# Patient Record
Sex: FEMALE | Race: WHITE | NOT HISPANIC OR LATINO | Employment: PART TIME | URBAN - METROPOLITAN AREA
[De-identification: names, ages, dates, MRNs, and addresses within clinical notes are randomized per-mention and may not be internally consistent; named-entity substitution may affect disease eponyms.]

---

## 2018-10-24 ENCOUNTER — TRANSFERRED RECORDS (OUTPATIENT)
Dept: HEALTH INFORMATION MANAGEMENT | Facility: CLINIC | Age: 58
End: 2018-10-24

## 2019-06-05 ENCOUNTER — TRANSFERRED RECORDS (OUTPATIENT)
Dept: HEALTH INFORMATION MANAGEMENT | Facility: CLINIC | Age: 59
End: 2019-06-05

## 2019-06-13 ENCOUNTER — TRANSFERRED RECORDS (OUTPATIENT)
Dept: HEALTH INFORMATION MANAGEMENT | Facility: CLINIC | Age: 59
End: 2019-06-13

## 2019-12-12 NOTE — PROGRESS NOTES
Subjective     Mayte Parmar is a 59 year old female who presents to clinic today for the following health issues:    HPI   New Patient/Transfer of Care from [place], [place]. Born and raised here in [place]. Her  has retired now and she currently has no insurance.   Diabetes Follow-up      How often are you checking your blood sugar? Not at all    What concerns do you have today about your diabetes? None     Do you have any of these symptoms? (Select all that apply)  No numbness or tingling in feet.  No redness, sores or blisters on feet.  No complaints of excessive thirst.  No reports of blurry vision.  No significant changes to weight.     Have you had a diabetic eye exam in the last 12 months? Yes- Date of last eye exam: 2019    BP Readings from Last 2 Encounters:   12/24/19 128/74     No results found for: A1C, LDL    Diabetes Management Resources  She would like to have her A1C checked. She has lost 143 pounds with a last A1C of 5.7 in August and weaned off of metformin. She continues on a low carb diet. She has not been on a statin    Gout  She has occasional flares of her feet and would like to have medication on hand. She doesn't recall what this was.     Patient Active Problem List   Diagnosis     Morbid obesity (H)     Type 2 diabetes mellitus without complication, without long-term current use of insulin (H)     Idiopathic chronic gout of foot without tophus, unspecified laterality     Past Surgical History:   Procedure Laterality Date     BREAST BIOPSY, CORE RT/LT Right 2011     DILATION AND CURETTAGE  1987    missed ab     DILATION AND CURETTAGE  1997    missed ab     MAMMOGRAM - HIM SCAN  2011     MASTECTOMY Right 2011    due to infected biospy site. No cancer     PAP DIAGNOSTIC SMEAR  1990       Social History     Tobacco Use     Smoking status: Never Smoker     Smokeless tobacco: Never Used   Substance Use Topics     Alcohol use: Never     Frequency: Never     Family History   Problem Relation Age  of Onset     Obesity Mother      Coronary Artery Disease Father      Depression Father      Obesity Father      Abdominal Aortic Aneurysm Father 73        cause of death         Current Outpatient Medications   Medication Sig Dispense Refill     aspirin (ASA) 81 MG EC tablet Take 1 tablet (81 mg) by mouth daily 100 tablet 3     ibuprofen (ADVIL/MOTRIN) 200 MG tablet Take 200 mg by mouth every 4 hours as needed for mild pain       indomethacin (INDOCIN) 25 MG capsule Take 1 capsule (25 mg) by mouth 2 times daily (with meals) 30 capsule 1     No Known Allergies  No lab results found.   BP Readings from Last 3 Encounters:   12/24/19 128/74    Wt Readings from Last 3 Encounters:   12/24/19 119.3 kg (263 lb)                      Reviewed and updated as needed this visit by Provider         Review of Systems   ROS COMP: Constitutional, HEENT, cardiovascular, pulmonary, GI, , musculoskeletal, neuro, skin, endocrine and psych systems are negative, except as otherwise noted.      Objective    /74   Pulse 87   Resp 19   Ht 1.524 m (5')   Wt 119.3 kg (263 lb)   SpO2 97%   Breastfeeding No   BMI 51.36 kg/m    Body mass index is 51.36 kg/m .  Physical Exam   GENERAL: healthy, alert and no distress  EYES: Eyes grossly normal to inspection, PERRL and conjunctivae and sclerae normal  HENT: ear canals and TM's normal, nose and mouth without ulcers or lesions  NECK: no adenopathy, no asymmetry, masses, or scars and thyroid normal to palpation  RESP: lungs clear to auscultation - no rales, rhonchi or wheezes  CV: regular rate and rhythm, normal S1 S2, no S3 or S4, no murmur, click or rub, no peripheral edema and peripheral pulses strong  ABDOMEN: soft, nontender, no hepatosplenomegaly, no masses and bowel sounds normal  MS: no gross musculoskeletal defects noted, no edema  SKIN: no suspicious lesions or rashes  NEURO: Normal strength and tone, mentation intact and speech normal  PSYCH: mentation appears normal, affect  normal/bright            Assessment & Plan     1. Encounter to establish care  PMH, PSH, SH, allergies, FH all reviewed. She has never had a colonoscopy but doesn't have insurance at this time,discussed cologuard. Will consider when she has insurance.Our community health staff has seen her today to help with getting insurance    2. Idiopathic chronic gout of foot without tophus, unspecified laterality  Will send in indocin for her to have on hand  - indomethacin (INDOCIN) 25 MG capsule; Take 1 capsule (25 mg) by mouth 2 times daily (with meals)  Dispense: 30 capsule; Refill: 1    3. Type 2 diabetes mellitus without complication, without long-term current use of insulin (H)  Check labs today. Advised starting and aspirin daily  - Hemoglobin A1c  - Lipid Profile    4. Morbid obesity (H)  Working on continued weight loss. She is encouraged to continue       BMI:   Estimated body mass index is 51.36 kg/m  as calculated from the following:    Height as of this encounter: 1.524 m (5').    Weight as of this encounter: 119.3 kg (263 lb).   Weight management plan: Discussed healthy diet and exercise guidelines            Follow up when she has insurance    Rachel Ball MD  [facility] [place]

## 2019-12-24 ENCOUNTER — OFFICE VISIT (OUTPATIENT)
Dept: BEHAVIORAL HEALTH | Facility: OTHER | Age: 59
End: 2019-12-24
Attending: SOCIAL WORKER

## 2019-12-24 ENCOUNTER — OFFICE VISIT (OUTPATIENT)
Dept: FAMILY MEDICINE | Facility: OTHER | Age: 59
End: 2019-12-24
Attending: FAMILY MEDICINE

## 2019-12-24 VITALS
HEIGHT: 60 IN | WEIGHT: 263 LBS | DIASTOLIC BLOOD PRESSURE: 74 MMHG | BODY MASS INDEX: 51.63 KG/M2 | SYSTOLIC BLOOD PRESSURE: 128 MMHG | OXYGEN SATURATION: 97 % | RESPIRATION RATE: 19 BRPM | HEART RATE: 87 BPM

## 2019-12-24 DIAGNOSIS — R69 DIAGNOSIS DEFERRED: Primary | ICD-10-CM

## 2019-12-24 DIAGNOSIS — E66.01 MORBID OBESITY (H): ICD-10-CM

## 2019-12-24 DIAGNOSIS — E11.9 TYPE 2 DIABETES MELLITUS WITHOUT COMPLICATION, WITHOUT LONG-TERM CURRENT USE OF INSULIN (H): ICD-10-CM

## 2019-12-24 DIAGNOSIS — Z76.89 ENCOUNTER TO ESTABLISH CARE: Primary | ICD-10-CM

## 2019-12-24 DIAGNOSIS — M1A.0790 IDIOPATHIC CHRONIC GOUT OF FOOT WITHOUT TOPHUS, UNSPECIFIED LATERALITY: ICD-10-CM

## 2019-12-24 LAB
CHOLEST SERPL-MCNC: 198 MG/DL
EST. AVERAGE GLUCOSE BLD GHB EST-MCNC: 117 MG/DL
HBA1C MFR BLD: 5.7 % (ref 0–5.6)
HDLC SERPL-MCNC: 60 MG/DL
LDLC SERPL CALC-MCNC: 123 MG/DL
NONHDLC SERPL-MCNC: 138 MG/DL
TRIGL SERPL-MCNC: 77 MG/DL

## 2019-12-24 PROCEDURE — 99204 OFFICE O/P NEW MOD 45 MIN: CPT | Performed by: FAMILY MEDICINE

## 2019-12-24 PROCEDURE — 83036 HEMOGLOBIN GLYCOSYLATED A1C: CPT | Performed by: FAMILY MEDICINE

## 2019-12-24 PROCEDURE — 80061 LIPID PANEL: CPT | Performed by: FAMILY MEDICINE

## 2019-12-24 PROCEDURE — 36415 COLL VENOUS BLD VENIPUNCTURE: CPT | Performed by: FAMILY MEDICINE

## 2019-12-24 RX ORDER — INDOMETHACIN 25 MG/1
25 CAPSULE ORAL 2 TIMES DAILY WITH MEALS
Qty: 30 CAPSULE | Refills: 1 | Status: SHIPPED | OUTPATIENT
Start: 2019-12-24 | End: 2021-09-21

## 2019-12-24 RX ORDER — IBUPROFEN 200 MG
200 TABLET ORAL EVERY 4 HOURS PRN
COMMUNITY

## 2019-12-24 SDOH — HEALTH STABILITY: MENTAL HEALTH: HOW OFTEN DO YOU HAVE A DRINK CONTAINING ALCOHOL?: NEVER

## 2019-12-24 ASSESSMENT — PAIN SCALES - GENERAL: PAINLEVEL: NO PAIN (0)

## 2019-12-24 ASSESSMENT — MIFFLIN-ST. JEOR: SCORE: 1679.46

## 2019-12-24 NOTE — NURSING NOTE
Chief Complaint   Patient presents with     Establish Care       Initial /74   Pulse 87   Resp 19   Ht 1.524 m (5')   Wt 119.3 kg (263 lb)   SpO2 97%   Breastfeeding No   BMI 51.36 kg/m   Estimated body mass index is 51.36 kg/m  as calculated from the following:    Height as of this encounter: 1.524 m (5').    Weight as of this encounter: 119.3 kg (263 lb).  Medication Reconciliation: complete  Dai Bradley LPN

## 2019-12-24 NOTE — PROGRESS NOTES
CHW met with patient, introduced self and program, and assisted patient with their current struggle with insurance. Patient has currently moved to [place] from [place] and does not have insurance. Patient was on their 's employer insurance in [place]. CHW brought patient to [employer] to be assisted in applying for insurance. CHW gave patient CHW and  EDWIN Stubbs contact information. CHW encouraged patient to reach out if they need anything.    PROSPER VazquezW  Community Health Worker, CHW  [facility]

## 2020-06-16 ENCOUNTER — MEDICAL CORRESPONDENCE (OUTPATIENT)
Dept: HEALTH INFORMATION MANAGEMENT | Facility: HOSPITAL | Age: 60
End: 2020-06-16

## 2021-04-15 ENCOUNTER — TRANSFERRED RECORDS (OUTPATIENT)
Dept: HEALTH INFORMATION MANAGEMENT | Facility: CLINIC | Age: 61
End: 2021-04-15

## 2021-09-16 ENCOUNTER — TELEPHONE (OUTPATIENT)
Dept: FAMILY MEDICINE | Facility: OTHER | Age: 61
End: 2021-09-16

## 2021-09-16 NOTE — TELEPHONE ENCOUNTER
Next 5 appointments (look out 90 days)      Sep 21, 2021  2:30 PM  (Arrive by 2:15 PM)  SHORT with Rachel Ball MD  [facility] [place] ([facility] ) [address]  [place] [address]  [phone]

## 2021-09-16 NOTE — TELEPHONE ENCOUNTER
Please schedule patient for date/time: can see next Tuesday 9/21/21 at 2:30  Have patient go to ER/Urgent Care Center. Urgent Care hours are 9:30 am to 8 pm, open 7 days a week. No.    Provider will call patient.No.    Other:

## 2021-09-17 NOTE — PROGRESS NOTES
Assessment & Plan     Sebaceous cyst  Of the right abdominal wall  Discussed removal. If it does enlarge she will consider this    Type 2 diabetes mellitus without complication, without long-term current use of insulin (H)  Due for lab  Add aspirin daily   Consider statin if lipids are increased  Eye exam done with Dr Forrest's office   - Albumin Random Urine Quantitative with Creat Ratio; Future  - Hemoglobin A1c; Future  - Lipid Profile (Chol, Trig, HDL, LDL calc); Future  - Comprehensive metabolic panel; Future  - CBC with platelets; Future  - aspirin (ASA) 81 MG EC tablet; Take 1 tablet (81 mg) by mouth daily    Encounter for screening mammogram for breast cancer  Mammogram declined     Idiopathic chronic gout of foot without tophus, unspecified laterality  Renewed   - indomethacin (INDOCIN) 25 MG capsule; Take 1 capsule (25 mg) by mouth 2 times daily (with meals)    Immunization due  PCV 13, dTap given today        22 minutes spent on the date of the encounter doing chart review, interpretation of tests, patient visit and documentation        BMI:   Estimated body mass index is 51.36 kg/m  as calculated from the following:    Height as of this encounter: 1.524 m (5').    Weight as of this encounter: 119.3 kg (263 lb).           No follow-ups on file.    Rachel Ball MD  [facility] [place] Hu is a 60 year old who presents for the following health issues     HPI     Concern - lump on waist  Onset: very recent   Description: lump on waist  Intensity: 0/10  Progression of Symptoms:  same  Accompanying Signs & Symptoms: none  Previous history of similar problem: no   Precipitating factors:        Worsened by: nothing  Alleviating factors:        Improved by: nothing  Therapies tried and outcome:  none     This is not painful and is not draining    She is home taking care of her mother and family    She has diabetes but has lost considerable weight, 143 pounds in the past. She  is due for lab         Review of Systems   Constitutional, HEENT, cardiovascular, pulmonary, gi and gu systems are negative, except as otherwise noted.      Objective    /78   Pulse 113   Temp 97.8  F (36.6  C) (Tympanic)   Resp 19   Ht 1.524 m (5')   Wt 119.3 kg (263 lb)   SpO2 98%   BMI 51.36 kg/m    Body mass index is 51.36 kg/m .  Physical Exam   GENERAL: healthy, alert and no distress  NECK: no adenopathy, no asymmetry, masses, or scars and thyroid normal to palpation  RESP: lungs clear to auscultation - no rales, rhonchi or wheezes  CV: regular rate and rhythm, normal S1 S2, no S3 or S4, no murmur, click or rub, no peripheral edema and peripheral pulses strong  MS: no gross musculoskeletal defects noted, no edema  SKIN: no suspicious lesions or rashes and 3x 5 cm soft, distinct cyst of the right abdominal skin, non tender, well circumscribed  NEURO: Normal strength and tone, mentation intact and speech normal  PSYCH: mentation appears normal, affect normal/bright  Diabetic foot exam: normal DP and PT pulses, no trophic changes or ulcerative lesions, normal sensory exam and normal monofilament exam

## 2021-09-21 ENCOUNTER — OFFICE VISIT (OUTPATIENT)
Dept: FAMILY MEDICINE | Facility: OTHER | Age: 61
End: 2021-09-21
Attending: FAMILY MEDICINE
Payer: COMMERCIAL

## 2021-09-21 VITALS
HEART RATE: 113 BPM | DIASTOLIC BLOOD PRESSURE: 78 MMHG | SYSTOLIC BLOOD PRESSURE: 134 MMHG | HEIGHT: 60 IN | WEIGHT: 263 LBS | TEMPERATURE: 97.8 F | OXYGEN SATURATION: 98 % | RESPIRATION RATE: 19 BRPM | BODY MASS INDEX: 51.63 KG/M2

## 2021-09-21 DIAGNOSIS — Z12.31 ENCOUNTER FOR SCREENING MAMMOGRAM FOR BREAST CANCER: ICD-10-CM

## 2021-09-21 DIAGNOSIS — E11.9 TYPE 2 DIABETES MELLITUS WITHOUT COMPLICATION, WITHOUT LONG-TERM CURRENT USE OF INSULIN (H): ICD-10-CM

## 2021-09-21 DIAGNOSIS — Z23 IMMUNIZATION DUE: ICD-10-CM

## 2021-09-21 DIAGNOSIS — L72.3 SEBACEOUS CYST: Primary | ICD-10-CM

## 2021-09-21 DIAGNOSIS — M1A.0790 IDIOPATHIC CHRONIC GOUT OF FOOT WITHOUT TOPHUS, UNSPECIFIED LATERALITY: ICD-10-CM

## 2021-09-21 LAB
ALBUMIN SERPL-MCNC: 3.6 G/DL (ref 3.4–5)
ALP SERPL-CCNC: 84 U/L (ref 40–150)
ALT SERPL W P-5'-P-CCNC: 24 U/L (ref 0–50)
ANION GAP SERPL CALCULATED.3IONS-SCNC: 8 MMOL/L (ref 3–14)
AST SERPL W P-5'-P-CCNC: 20 U/L (ref 0–45)
BILIRUB SERPL-MCNC: 0.6 MG/DL (ref 0.2–1.3)
BUN SERPL-MCNC: 22 MG/DL (ref 7–30)
CALCIUM SERPL-MCNC: 9.6 MG/DL (ref 8.5–10.1)
CHLORIDE BLD-SCNC: 107 MMOL/L (ref 94–109)
CHOLEST SERPL-MCNC: 214 MG/DL
CO2 SERPL-SCNC: 24 MMOL/L (ref 20–32)
CREAT SERPL-MCNC: 0.86 MG/DL (ref 0.52–1.04)
CREAT UR-MCNC: 94 MG/DL
ERYTHROCYTE [DISTWIDTH] IN BLOOD BY AUTOMATED COUNT: 14.1 % (ref 10–15)
EST. AVERAGE GLUCOSE BLD GHB EST-MCNC: 108 MG/DL
FASTING STATUS PATIENT QL REPORTED: YES
GFR SERPL CREATININE-BSD FRML MDRD: 73 ML/MIN/1.73M2
GLUCOSE BLD-MCNC: 108 MG/DL (ref 70–99)
HBA1C MFR BLD: 5.4 % (ref 0–5.6)
HCT VFR BLD AUTO: 47.7 % (ref 35–47)
HDLC SERPL-MCNC: 83 MG/DL
HGB BLD-MCNC: 15.7 G/DL (ref 11.7–15.7)
LDLC SERPL CALC-MCNC: 117 MG/DL
MCH RBC QN AUTO: 29.9 PG (ref 26.5–33)
MCHC RBC AUTO-ENTMCNC: 32.9 G/DL (ref 31.5–36.5)
MCV RBC AUTO: 91 FL (ref 78–100)
MICROALBUMIN UR-MCNC: 13 MG/L
MICROALBUMIN/CREAT UR: 13.83 MG/G CR (ref 0–25)
NONHDLC SERPL-MCNC: 131 MG/DL
PLATELET # BLD AUTO: 295 10E3/UL (ref 150–450)
POTASSIUM BLD-SCNC: 3.5 MMOL/L (ref 3.4–5.3)
PROT SERPL-MCNC: 7.9 G/DL (ref 6.8–8.8)
RBC # BLD AUTO: 5.25 10E6/UL (ref 3.8–5.2)
SODIUM SERPL-SCNC: 139 MMOL/L (ref 133–144)
TRIGL SERPL-MCNC: 68 MG/DL
WBC # BLD AUTO: 9.6 10E3/UL (ref 4–11)

## 2021-09-21 PROCEDURE — 82043 UR ALBUMIN QUANTITATIVE: CPT | Mod: ZL | Performed by: FAMILY MEDICINE

## 2021-09-21 PROCEDURE — G0009 ADMIN PNEUMOCOCCAL VACCINE: HCPCS

## 2021-09-21 PROCEDURE — 99213 OFFICE O/P EST LOW 20 MIN: CPT | Performed by: FAMILY MEDICINE

## 2021-09-21 PROCEDURE — G0463 HOSPITAL OUTPT CLINIC VISIT: HCPCS | Mod: 25

## 2021-09-21 PROCEDURE — 83036 HEMOGLOBIN GLYCOSYLATED A1C: CPT | Mod: ZL | Performed by: FAMILY MEDICINE

## 2021-09-21 PROCEDURE — 85027 COMPLETE CBC AUTOMATED: CPT | Mod: ZL | Performed by: FAMILY MEDICINE

## 2021-09-21 PROCEDURE — 90715 TDAP VACCINE 7 YRS/> IM: CPT

## 2021-09-21 PROCEDURE — 80053 COMPREHEN METABOLIC PANEL: CPT | Mod: ZL | Performed by: FAMILY MEDICINE

## 2021-09-21 PROCEDURE — 80061 LIPID PANEL: CPT | Mod: ZL | Performed by: FAMILY MEDICINE

## 2021-09-21 PROCEDURE — 36415 COLL VENOUS BLD VENIPUNCTURE: CPT | Mod: ZL | Performed by: FAMILY MEDICINE

## 2021-09-21 PROCEDURE — G0009 ADMIN PNEUMOCOCCAL VACCINE: HCPCS | Performed by: FAMILY MEDICINE

## 2021-09-21 RX ORDER — INDOMETHACIN 25 MG/1
25 CAPSULE ORAL 2 TIMES DAILY WITH MEALS
Qty: 60 CAPSULE | Refills: 1 | Status: SHIPPED | OUTPATIENT
Start: 2021-09-21

## 2021-09-21 ASSESSMENT — MIFFLIN-ST. JEOR: SCORE: 1674.46

## 2021-09-21 ASSESSMENT — PAIN SCALES - GENERAL: PAINLEVEL: NO PAIN (0)

## 2021-09-21 ASSESSMENT — PATIENT HEALTH QUESTIONNAIRE - PHQ9: SUM OF ALL RESPONSES TO PHQ QUESTIONS 1-9: 0

## 2021-09-21 NOTE — NURSING NOTE
Chief Complaint   Patient presents with     Mass       Initial /78   Pulse 113   Temp 97.8  F (36.6  C) (Tympanic)   Resp 19   Ht 1.524 m (5')   Wt 119.3 kg (263 lb)   SpO2 98%   BMI 51.36 kg/m   Estimated body mass index is 51.36 kg/m  as calculated from the following:    Height as of this encounter: 1.524 m (5').    Weight as of this encounter: 119.3 kg (263 lb).  Medication Reconciliation: complete  Dai Bradley LPN

## 2022-03-11 ENCOUNTER — TELEPHONE (OUTPATIENT)
Dept: FAMILY MEDICINE | Facility: OTHER | Age: 62
End: 2022-03-11
Payer: COMMERCIAL

## 2022-03-11 DIAGNOSIS — C50.919 MALIGNANT NEOPLASM OF FEMALE BREAST, UNSPECIFIED ESTROGEN RECEPTOR STATUS, UNSPECIFIED LATERALITY, UNSPECIFIED SITE OF BREAST (H): Primary | ICD-10-CM

## 2022-03-11 NOTE — TELEPHONE ENCOUNTER
[facility] in [place] called stating patient is wanting new prescription for breast prosthetics. Patient was a Dr. Rachel Ball patient but has not established care with anyone else at this time.    DME pended.    Fax: [phone]- gabriela  
SGA (small for gestational age), 2,500+ grams

## 2022-08-09 ENCOUNTER — OFFICE VISIT (OUTPATIENT)
Dept: FAMILY MEDICINE | Facility: OTHER | Age: 62
End: 2022-08-09
Attending: STUDENT IN AN ORGANIZED HEALTH CARE EDUCATION/TRAINING PROGRAM
Payer: COMMERCIAL

## 2022-08-09 VITALS
DIASTOLIC BLOOD PRESSURE: 72 MMHG | TEMPERATURE: 96.8 F | BODY MASS INDEX: 48.1 KG/M2 | WEIGHT: 245 LBS | SYSTOLIC BLOOD PRESSURE: 130 MMHG | RESPIRATION RATE: 18 BRPM | HEIGHT: 60 IN | HEART RATE: 110 BPM | OXYGEN SATURATION: 97 %

## 2022-08-09 DIAGNOSIS — E66.01 MORBID OBESITY (H): ICD-10-CM

## 2022-08-09 DIAGNOSIS — E11.9 TYPE 2 DIABETES MELLITUS WITHOUT COMPLICATION, WITHOUT LONG-TERM CURRENT USE OF INSULIN (H): ICD-10-CM

## 2022-08-09 DIAGNOSIS — R49.0 DYSPHONIA: ICD-10-CM

## 2022-08-09 DIAGNOSIS — R53.83 FATIGUE, UNSPECIFIED TYPE: ICD-10-CM

## 2022-08-09 DIAGNOSIS — Z76.89 ENCOUNTER TO ESTABLISH CARE: Primary | ICD-10-CM

## 2022-08-09 DIAGNOSIS — Z90.11 HISTORY OF RIGHT MASTECTOMY: ICD-10-CM

## 2022-08-09 LAB
ALBUMIN SERPL-MCNC: 3.8 G/DL (ref 3.4–5)
ALP SERPL-CCNC: 77 U/L (ref 40–150)
ALT SERPL W P-5'-P-CCNC: 26 U/L (ref 0–50)
ANION GAP SERPL CALCULATED.3IONS-SCNC: 12 MMOL/L (ref 3–14)
AST SERPL W P-5'-P-CCNC: 23 U/L (ref 0–45)
BASOPHILS # BLD AUTO: 0 10E3/UL (ref 0–0.2)
BASOPHILS NFR BLD AUTO: 1 %
BILIRUB SERPL-MCNC: 0.6 MG/DL (ref 0.2–1.3)
BUN SERPL-MCNC: 20 MG/DL (ref 7–30)
CALCIUM SERPL-MCNC: 9.7 MG/DL (ref 8.5–10.1)
CHLORIDE BLD-SCNC: 103 MMOL/L (ref 94–109)
CO2 SERPL-SCNC: 23 MMOL/L (ref 20–32)
CREAT SERPL-MCNC: 0.74 MG/DL (ref 0.52–1.04)
EOSINOPHIL # BLD AUTO: 0.1 10E3/UL (ref 0–0.7)
EOSINOPHIL NFR BLD AUTO: 1 %
ERYTHROCYTE [DISTWIDTH] IN BLOOD BY AUTOMATED COUNT: 13.5 % (ref 10–15)
GFR SERPL CREATININE-BSD FRML MDRD: 90 ML/MIN/1.73M2
GLUCOSE BLD-MCNC: 98 MG/DL (ref 70–99)
HCT VFR BLD AUTO: 48.2 % (ref 35–47)
HGB BLD-MCNC: 15.9 G/DL (ref 11.7–15.7)
IMM GRANULOCYTES # BLD: 0 10E3/UL
IMM GRANULOCYTES NFR BLD: 0 %
LYMPHOCYTES # BLD AUTO: 2.9 10E3/UL (ref 0.8–5.3)
LYMPHOCYTES NFR BLD AUTO: 33 %
MCH RBC QN AUTO: 30.1 PG (ref 26.5–33)
MCHC RBC AUTO-ENTMCNC: 33 G/DL (ref 31.5–36.5)
MCV RBC AUTO: 91 FL (ref 78–100)
MONOCYTES # BLD AUTO: 0.5 10E3/UL (ref 0–1.3)
MONOCYTES NFR BLD AUTO: 6 %
NEUTROPHILS # BLD AUTO: 5.3 10E3/UL (ref 1.6–8.3)
NEUTROPHILS NFR BLD AUTO: 59 %
NRBC # BLD AUTO: 0 10E3/UL
NRBC BLD AUTO-RTO: 0 /100
PLATELET # BLD AUTO: 285 10E3/UL (ref 150–450)
POTASSIUM BLD-SCNC: 3.5 MMOL/L (ref 3.4–5.3)
PROT SERPL-MCNC: 7.9 G/DL (ref 6.8–8.8)
RBC # BLD AUTO: 5.29 10E6/UL (ref 3.8–5.2)
SODIUM SERPL-SCNC: 138 MMOL/L (ref 133–144)
TSH SERPL DL<=0.005 MIU/L-ACNC: 2.21 MU/L (ref 0.4–4)
WBC # BLD AUTO: 8.8 10E3/UL (ref 4–11)

## 2022-08-09 PROCEDURE — 82607 VITAMIN B-12: CPT | Performed by: STUDENT IN AN ORGANIZED HEALTH CARE EDUCATION/TRAINING PROGRAM

## 2022-08-09 PROCEDURE — 85025 COMPLETE CBC W/AUTO DIFF WBC: CPT | Mod: ZL | Performed by: STUDENT IN AN ORGANIZED HEALTH CARE EDUCATION/TRAINING PROGRAM

## 2022-08-09 PROCEDURE — 82040 ASSAY OF SERUM ALBUMIN: CPT | Mod: ZL | Performed by: STUDENT IN AN ORGANIZED HEALTH CARE EDUCATION/TRAINING PROGRAM

## 2022-08-09 PROCEDURE — 99214 OFFICE O/P EST MOD 30 MIN: CPT | Performed by: STUDENT IN AN ORGANIZED HEALTH CARE EDUCATION/TRAINING PROGRAM

## 2022-08-09 PROCEDURE — G0463 HOSPITAL OUTPT CLINIC VISIT: HCPCS

## 2022-08-09 PROCEDURE — 82306 VITAMIN D 25 HYDROXY: CPT | Mod: ZL | Performed by: STUDENT IN AN ORGANIZED HEALTH CARE EDUCATION/TRAINING PROGRAM

## 2022-08-09 PROCEDURE — 80053 COMPREHEN METABOLIC PANEL: CPT | Mod: ZL | Performed by: STUDENT IN AN ORGANIZED HEALTH CARE EDUCATION/TRAINING PROGRAM

## 2022-08-09 PROCEDURE — 36415 COLL VENOUS BLD VENIPUNCTURE: CPT | Mod: ZL | Performed by: STUDENT IN AN ORGANIZED HEALTH CARE EDUCATION/TRAINING PROGRAM

## 2022-08-09 PROCEDURE — 84443 ASSAY THYROID STIM HORMONE: CPT | Mod: ZL | Performed by: STUDENT IN AN ORGANIZED HEALTH CARE EDUCATION/TRAINING PROGRAM

## 2022-08-09 ASSESSMENT — ANXIETY QUESTIONNAIRES
1. FEELING NERVOUS, ANXIOUS, OR ON EDGE: NOT AT ALL
IF YOU CHECKED OFF ANY PROBLEMS ON THIS QUESTIONNAIRE, HOW DIFFICULT HAVE THESE PROBLEMS MADE IT FOR YOU TO DO YOUR WORK, TAKE CARE OF THINGS AT HOME, OR GET ALONG WITH OTHER PEOPLE: NOT DIFFICULT AT ALL
3. WORRYING TOO MUCH ABOUT DIFFERENT THINGS: NOT AT ALL
2. NOT BEING ABLE TO STOP OR CONTROL WORRYING: NOT AT ALL
7. FEELING AFRAID AS IF SOMETHING AWFUL MIGHT HAPPEN: NOT AT ALL
6. BECOMING EASILY ANNOYED OR IRRITABLE: SEVERAL DAYS
GAD7 TOTAL SCORE: 1
GAD7 TOTAL SCORE: 1
4. TROUBLE RELAXING: NOT AT ALL
5. BEING SO RESTLESS THAT IT IS HARD TO SIT STILL: NOT AT ALL

## 2022-08-09 ASSESSMENT — PATIENT HEALTH QUESTIONNAIRE - PHQ9: SUM OF ALL RESPONSES TO PHQ QUESTIONS 1-9: 1

## 2022-08-09 ASSESSMENT — PAIN SCALES - GENERAL: PAINLEVEL: NO PAIN (0)

## 2022-08-09 NOTE — NURSING NOTE
Chief Complaint   Patient presents with     [facility] Care     Diabetes     Arthritis     Gout         Initial /72   Pulse 110   Temp 96.8  F (36  C) (Tympanic)   Resp 18   Ht 1.524 m (5')   Wt 111.1 kg (245 lb)   SpO2 97%   BMI 47.85 kg/m   Estimated body mass index is 47.85 kg/m  as calculated from the following:    Height as of this encounter: 1.524 m (5').    Weight as of this encounter: 111.1 kg (245 lb).  Medication Reconciliation: complete  Kala Eduardo LPN

## 2022-08-09 NOTE — PROGRESS NOTES
Assessment & Plan     Encounter to establish care  Former patient of Dr. Ball who has recently retired.    Morbid obesity (H)  Discussed healthy lifestyle and diet    Type 2 diabetes mellitus without complication, without long-term current use of insulin (H)   controlled  Patient's most recent HA1c is normal at 5.4  Up to date on eye exam    Dysphonia  Reflux vs vocal abuse?  Patient does notice this more with singing than talking  There is no hoarseness appreciated during 40 minute encounter  Patient without concerning history which would warrant early direct visualization of vocal folds.  Low index of suspicion for serious underlying pathology  Will continue to follow this and monitor at regular intervals  Anticipatory guidance discussed with patient as well as red flag symptoms that would warrant further evaluation.  Patient agreeable to this for now       BMI:   Estimated body mass index is 47.85 kg/m  as calculated from the following:    Height as of this encounter: 1.524 m (5').    Weight as of this encounter: 111.1 kg (245 lb).   Weight management plan: Discussed healthy diet and exercise guidelines      No follow-ups on file.    Miroslava Solorzano MD  [facility] is a 61 year [facility], presenting for the following health issues:  Establish Care, Diabetes, and Arthritis (Gout/)      HPI     Feels like she has something stuck in her throat.  'Sometimes when I swallow or sing, it feels hoarse'.  No pain. Feels a pain on the left side.  No history of smoking or drinking.  Notices hoarseness with singing, not with talking.  In the last year, has noticed this more.  Has history of reflux, but no symptoms recently  Takes Tums a few times a week  Calms down quickly when she stops singing.  No trouble breathing no palpable thyroid nodules or masses.      Diabetes Follow-up      How often are you checking your blood sugar? Not at all    What concerns do you have today  about your diabetes? None     Do you have any of these symptoms? (Select all that apply)  No numbness or tingling in feet.  No redness, sores or blisters on feet.  No complaints of excessive thirst.  No reports of blurry vision.  No significant changes to weight.    Have you had a diabetic eye exam in the last 12 months? Yes-  Location: [facility]        BP Readings from Last 2 Encounters:   09/21/21 134/78   12/24/19 128/74     Hemoglobin A1C (%)   Date Value   09/21/2021 5.4   12/24/2019 5.7 (H)     LDL Cholesterol Calculated (mg/dL)   Date Value   09/21/2021 117 (H)   12/24/2019 123 (H)       Review of Systems   Constitutional, HEENT, cardiovascular, pulmonary, GI, , musculoskeletal, neuro, skin, endocrine and psych systems are negative, except as otherwise noted.      Objective    /72   Pulse 110   Temp 96.8  F (36  C) (Tympanic)   Resp 18   Ht 1.524 m (5')   Wt 111.1 kg (245 lb)   SpO2 97%   BMI 47.85 kg/m    Body mass index is 47.85 kg/m .  Physical Exam   GENERAL: healthy, alert and no distress  EYES: Eyes grossly normal to inspection, PERRL and conjunctivae and sclerae normal  HENT: ear canals and TM's normal, nose and mouth without ulcers or lesions  NECK: no adenopathy, no asymmetry, masses, or scars and thyroid normal to palpation  RESP: lungs clear to auscultation - no rales, rhonchi or wheezes  BREAST: normal without masses, tenderness or nipple discharge and no palpable axillary masses or adenopathy  CV: regular rate and rhythm, normal S1 S2, no S3 or S4, no murmur, click or rub, no peripheral edema and peripheral pulses strong  ABDOMEN: soft, nontender, no hepatosplenomegaly, no masses and bowel sounds normal  MS: no gross musculoskeletal defects noted, no edema  SKIN: no suspicious lesions or rashes  NEURO: Normal strength and tone, mentation intact and speech normal  PSYCH: mentation appears normal, affect normal/bright

## 2022-08-10 LAB — VIT B12 SERPL-MCNC: 803 PG/ML (ref 232–1245)

## 2022-08-11 LAB — DEPRECATED CALCIDIOL+CALCIFEROL SERPL-MC: 49 UG/L (ref 20–75)

## 2022-11-09 DIAGNOSIS — E11.9 TYPE 2 DIABETES MELLITUS WITHOUT COMPLICATION, WITHOUT LONG-TERM CURRENT USE OF INSULIN (H): Primary | ICD-10-CM

## 2022-12-02 ENCOUNTER — LAB (OUTPATIENT)
Dept: LAB | Facility: OTHER | Age: 62
End: 2022-12-02
Payer: COMMERCIAL

## 2022-12-02 DIAGNOSIS — E11.9 TYPE 2 DIABETES MELLITUS WITHOUT COMPLICATION, WITHOUT LONG-TERM CURRENT USE OF INSULIN (H): ICD-10-CM

## 2022-12-02 LAB
CREAT UR-MCNC: 53.2 MG/DL
MICROALBUMIN UR-MCNC: <12 MG/L
MICROALBUMIN/CREAT UR: NORMAL MG/G{CREAT}

## 2022-12-02 PROCEDURE — 82043 UR ALBUMIN QUANTITATIVE: CPT | Mod: ZL

## 2023-02-27 ENCOUNTER — OFFICE VISIT (OUTPATIENT)
Dept: FAMILY MEDICINE | Facility: OTHER | Age: 63
End: 2023-02-27
Attending: STUDENT IN AN ORGANIZED HEALTH CARE EDUCATION/TRAINING PROGRAM
Payer: COMMERCIAL

## 2023-02-27 ENCOUNTER — ANCILLARY PROCEDURE (OUTPATIENT)
Dept: GENERAL RADIOLOGY | Facility: OTHER | Age: 63
End: 2023-02-27
Attending: STUDENT IN AN ORGANIZED HEALTH CARE EDUCATION/TRAINING PROGRAM
Payer: COMMERCIAL

## 2023-02-27 VITALS
SYSTOLIC BLOOD PRESSURE: 136 MMHG | TEMPERATURE: 98.6 F | OXYGEN SATURATION: 96 % | RESPIRATION RATE: 16 BRPM | HEART RATE: 127 BPM | WEIGHT: 239 LBS | HEIGHT: 60 IN | BODY MASS INDEX: 46.92 KG/M2 | DIASTOLIC BLOOD PRESSURE: 74 MMHG

## 2023-02-27 DIAGNOSIS — L03.115 CELLULITIS OF RIGHT LOWER EXTREMITY: ICD-10-CM

## 2023-02-27 DIAGNOSIS — M25.561 ACUTE PAIN OF RIGHT KNEE: ICD-10-CM

## 2023-02-27 DIAGNOSIS — M25.561 ACUTE PAIN OF RIGHT KNEE: Primary | ICD-10-CM

## 2023-02-27 LAB
BASOPHILS # BLD AUTO: 0 10E3/UL (ref 0–0.2)
BASOPHILS NFR BLD AUTO: 0 %
CRP SERPL-MCNC: 49.35 MG/L
EOSINOPHIL # BLD AUTO: 0.1 10E3/UL (ref 0–0.7)
EOSINOPHIL NFR BLD AUTO: 1 %
ERYTHROCYTE [DISTWIDTH] IN BLOOD BY AUTOMATED COUNT: 13.5 % (ref 10–15)
ERYTHROCYTE [SEDIMENTATION RATE] IN BLOOD BY WESTERGREN METHOD: 15 MM/HR (ref 0–30)
HCT VFR BLD AUTO: 47.9 % (ref 35–47)
HGB BLD-MCNC: 15.5 G/DL (ref 11.7–15.7)
IMM GRANULOCYTES # BLD: 0 10E3/UL
IMM GRANULOCYTES NFR BLD: 0 %
LYMPHOCYTES # BLD AUTO: 2.2 10E3/UL (ref 0.8–5.3)
LYMPHOCYTES NFR BLD AUTO: 23 %
MCH RBC QN AUTO: 29.6 PG (ref 26.5–33)
MCHC RBC AUTO-ENTMCNC: 32.4 G/DL (ref 31.5–36.5)
MCV RBC AUTO: 91 FL (ref 78–100)
MONOCYTES # BLD AUTO: 0.8 10E3/UL (ref 0–1.3)
MONOCYTES NFR BLD AUTO: 8 %
NEUTROPHILS # BLD AUTO: 6.6 10E3/UL (ref 1.6–8.3)
NEUTROPHILS NFR BLD AUTO: 68 %
NRBC # BLD AUTO: 0 10E3/UL
NRBC BLD AUTO-RTO: 0 /100
PLATELET # BLD AUTO: 298 10E3/UL (ref 150–450)
RBC # BLD AUTO: 5.24 10E6/UL (ref 3.8–5.2)
WBC # BLD AUTO: 9.7 10E3/UL (ref 4–11)

## 2023-02-27 PROCEDURE — 99214 OFFICE O/P EST MOD 30 MIN: CPT | Performed by: STUDENT IN AN ORGANIZED HEALTH CARE EDUCATION/TRAINING PROGRAM

## 2023-02-27 PROCEDURE — 36415 COLL VENOUS BLD VENIPUNCTURE: CPT | Mod: ZL | Performed by: STUDENT IN AN ORGANIZED HEALTH CARE EDUCATION/TRAINING PROGRAM

## 2023-02-27 PROCEDURE — 73562 X-RAY EXAM OF KNEE 3: CPT | Mod: TC,RT

## 2023-02-27 PROCEDURE — G0463 HOSPITAL OUTPT CLINIC VISIT: HCPCS | Mod: 25

## 2023-02-27 PROCEDURE — 86140 C-REACTIVE PROTEIN: CPT | Mod: ZL | Performed by: STUDENT IN AN ORGANIZED HEALTH CARE EDUCATION/TRAINING PROGRAM

## 2023-02-27 PROCEDURE — 85014 HEMATOCRIT: CPT | Mod: ZL | Performed by: STUDENT IN AN ORGANIZED HEALTH CARE EDUCATION/TRAINING PROGRAM

## 2023-02-27 PROCEDURE — 96372 THER/PROPH/DIAG INJ SC/IM: CPT | Performed by: STUDENT IN AN ORGANIZED HEALTH CARE EDUCATION/TRAINING PROGRAM

## 2023-02-27 PROCEDURE — 85652 RBC SED RATE AUTOMATED: CPT | Mod: ZL | Performed by: STUDENT IN AN ORGANIZED HEALTH CARE EDUCATION/TRAINING PROGRAM

## 2023-02-27 PROCEDURE — 250N000011 HC RX IP 250 OP 636: Performed by: STUDENT IN AN ORGANIZED HEALTH CARE EDUCATION/TRAINING PROGRAM

## 2023-02-27 RX ORDER — CEFTRIAXONE SODIUM 1 G
2 VIAL (EA) INJECTION ONCE
Status: DISCONTINUED | OUTPATIENT
Start: 2023-02-27 | End: 2023-02-27

## 2023-02-27 RX ORDER — CEFTRIAXONE SODIUM 1 G
2 VIAL (EA) INJECTION ONCE
Status: COMPLETED | OUTPATIENT
Start: 2023-02-27 | End: 2023-02-27

## 2023-02-27 RX ORDER — CEFTRIAXONE SODIUM 2 G
0.35 VIAL (EA) INJECTION ONCE
Status: DISCONTINUED | OUTPATIENT
Start: 2023-02-27 | End: 2023-02-27

## 2023-02-27 RX ADMIN — CEFTRIAXONE SODIUM 2 G: 1 INJECTION, POWDER, FOR SOLUTION INTRAMUSCULAR; INTRAVENOUS at 14:54

## 2023-02-27 ASSESSMENT — PAIN SCALES - GENERAL: PAINLEVEL: SEVERE PAIN (7)

## 2023-02-27 NOTE — PROGRESS NOTES
Assessment & Plan     Acute pain of right knee  Mechanical fall outside on ice on 2/09.  Swelling noted to anterior knee since that time.  Recently more tenderness of right anterior knee with warmth and surrounding redness.  The redness appears to be about the same per patient report.  See image below in chart.    XR shows severe OA of the right knee, no other acute changes noted  Will obtain blood work to rule out septic joint though clinically unlikely given that patient is able to ambulate without difficulty and ROM is intact.  Will send home with knee immobilizer  Advised on activity modification to relieve pain, icing, elevation, rest  CBC, ESR, and CRP ordered  Did speak with OA's  who is willing to accommodate patient into schedule tomorrow 2/28  - XR Knee Right 3 Views (Clinic Performed); Future  - CBC with platelets and differential; Future  - ESR: Erythrocyte sedimentation rate; Future  - CRP, inflammation; Future  - Knee Supplies Order Knee Immobilizer; Right    Cellulitis of right lower extremity  Swelling of anterior knee.  Septic bursitis vs traumatic bursitis.  The knee is quite tender to touch with overlying erythema and circumferential erythema.  There is also significant warmth.  Will treat with dose of Rocephin 2 grams IM   - cefTRIAXone (ROCEPHIN) in NS for IM administration 0.35 g    BMI:   Estimated body mass index is 46.68 kg/m  as calculated from the following:    Height as of this encounter: 1.524 m (5').    Weight as of this encounter: 108.4 kg (239 lb).     Return in about 1 week (around 3/6/2023) for Follow up.    Miroslava Solorzano MD  [facility] [place] Hu is a 62 year old, presenting for the following health issues:  Diabetes and Musculoskeletal Problem      HPI     BP Readings from Last 2 Encounters:   02/27/23 136/74   08/09/22 130/72     Hemoglobin A1C (%)   Date Value   09/21/2021 5.4   12/24/2019 5.7 (H)     LDL Cholesterol Calculated  (mg/dL)   Date Value   09/21/2021 117 (H)   12/24/2019 123 (H)           Concern - right knee pain   Onset: 2/9/23  Description: Fell on ice  Intensity: moderate  Progression of Symptoms:  worsening  Accompanying Signs & Symptoms: Pain, weakness, bump on right knee cap  Previous history of similar problem: No  Precipitating factors:        Worsened by: NA  Alleviating factors:        Improved by: NA  Therapies tried and outcome:  none       Review of Systems   Constitutional, HEENT, cardiovascular, pulmonary, gi and gu systems are negative, except as otherwise noted.      Objective    /74 (BP Location: Right arm, Patient Position: Sitting, Cuff Size: Adult Regular)   Pulse (!) 127   Temp 98.6  F (37  C) (Tympanic)   Resp 16   Ht 1.524 m (5')   Wt 108.4 kg (239 lb)   SpO2 96%   BMI 46.68 kg/m    Body mass index is 46.68 kg/m .  Physical Exam   GENERAL: healthy, alert and no distress  EYES: Eyes grossly normal to inspection, PERRL and conjunctivae and sclerae normal  HENT: ear canals and TM's normal, nose and mouth without ulcers or lesions  NECK: no adenopathy, no asymmetry, masses, or scars and thyroid normal to palpation  RESP: lungs clear to auscultation - no rales, rhonchi or wheezes  CV: regular rate and rhythm, normal S1 S2, no S3 or S4, no murmur, click or rub, no peripheral edema and peripheral pulses strong  ABDOMEN: soft, nontender, no hepatosplenomegaly, no masses and bowel sounds normal  MS: no gross musculoskeletal defects noted, no edema  SKIN: palpable fluctuant subcutaneous mass anterior to right knee cap with overlying blanching erythema.  There is surrounding erythema that extends beyond the borders of the fluctuant mass.  Exquisitely tender to palpation and very warm to touch.    NEURO: Normal strength and tone, mentation intact and speech normal  PSYCH: mentation appears normal, affect normal/bright

## 2023-02-27 NOTE — PATIENT INSTRUCTIONS
knee knee immobilizer  Ice your knee  Elevate your knee and rest as much as possible  See Dr. Andrade tomorrow 2/28.  Please call his office today and report your symptoms and tell the office that Miroslava Solorzano had a conversation with Dr. Sivakumar Andrade and that he (or his nurse practicioner) are okay to see you tomorrow.    [facility]  [phone]

## 2023-02-28 ENCOUNTER — TRANSFERRED RECORDS (OUTPATIENT)
Dept: HEALTH INFORMATION MANAGEMENT | Facility: CLINIC | Age: 63
End: 2023-02-28

## 2023-03-09 ENCOUNTER — OFFICE VISIT (OUTPATIENT)
Dept: FAMILY MEDICINE | Facility: OTHER | Age: 63
End: 2023-03-09
Attending: STUDENT IN AN ORGANIZED HEALTH CARE EDUCATION/TRAINING PROGRAM
Payer: COMMERCIAL

## 2023-03-09 VITALS
BODY MASS INDEX: 46.13 KG/M2 | TEMPERATURE: 98.1 F | HEART RATE: 101 BPM | RESPIRATION RATE: 16 BRPM | SYSTOLIC BLOOD PRESSURE: 138 MMHG | OXYGEN SATURATION: 98 % | WEIGHT: 235 LBS | DIASTOLIC BLOOD PRESSURE: 70 MMHG | HEIGHT: 60 IN

## 2023-03-09 DIAGNOSIS — Z11.59 NEED FOR HEPATITIS C SCREENING TEST: ICD-10-CM

## 2023-03-09 DIAGNOSIS — Z12.4 CERVICAL CANCER SCREENING: ICD-10-CM

## 2023-03-09 DIAGNOSIS — M25.561 BILATERAL CHRONIC KNEE PAIN: Primary | ICD-10-CM

## 2023-03-09 DIAGNOSIS — E11.9 TYPE 2 DIABETES MELLITUS WITHOUT COMPLICATION, WITHOUT LONG-TERM CURRENT USE OF INSULIN (H): ICD-10-CM

## 2023-03-09 DIAGNOSIS — L03.115 CELLULITIS OF RIGHT KNEE: ICD-10-CM

## 2023-03-09 DIAGNOSIS — M25.562 BILATERAL CHRONIC KNEE PAIN: Primary | ICD-10-CM

## 2023-03-09 DIAGNOSIS — G89.29 BILATERAL CHRONIC KNEE PAIN: Primary | ICD-10-CM

## 2023-03-09 DIAGNOSIS — Z11.4 SCREENING FOR HIV (HUMAN IMMUNODEFICIENCY VIRUS): ICD-10-CM

## 2023-03-09 DIAGNOSIS — Z12.11 SCREEN FOR COLON CANCER: ICD-10-CM

## 2023-03-09 DIAGNOSIS — Z13.220 SCREENING FOR HYPERLIPIDEMIA: ICD-10-CM

## 2023-03-09 DIAGNOSIS — Z12.31 ENCOUNTER FOR SCREENING MAMMOGRAM FOR BREAST CANCER: ICD-10-CM

## 2023-03-09 PROCEDURE — 99213 OFFICE O/P EST LOW 20 MIN: CPT | Performed by: STUDENT IN AN ORGANIZED HEALTH CARE EDUCATION/TRAINING PROGRAM

## 2023-03-09 PROCEDURE — G0463 HOSPITAL OUTPT CLINIC VISIT: HCPCS | Mod: 25

## 2023-03-09 PROCEDURE — 90750 HZV VACC RECOMBINANT IM: CPT

## 2023-03-09 PROCEDURE — 90472 IMMUNIZATION ADMIN EACH ADD: CPT

## 2023-03-09 RX ORDER — CEFADROXIL 500 MG/1
1 CAPSULE ORAL 2 TIMES DAILY
COMMUNITY
Start: 2023-02-28

## 2023-03-09 ASSESSMENT — PAIN SCALES - GENERAL: PAINLEVEL: NO PAIN (0)

## 2023-03-09 NOTE — PROGRESS NOTES
Assessment & Plan     Bilateral chronic knee pain  Chronic knee pain. Most likely component of OA.  Patient's body habitus is likely contributing  XR of right knee shows severe OA.    Given active resolving inflammation, will hold off on injections at this time  Will benefit from course of PT and knee injections in the future  - Physical Therapy Referral; Future    Cellulitis of right knee  Significantly improved since last visit  Patient did see [facility] and Rxed oral antibiotic  She will be done with antibiotics tomorrow  On exam, swelling is minimal.  There is area of erythema at inferior aspect of patella, blanching.  No open sores, no drainage.      Encounter for screening mammogram for breast cancer  Counseled on importance of mammogram.  Declines.    Screen for colon cancer  Counseled on this, she defers to later time    Screening for HIV (human immunodeficiency virus)  Agrees to screening  - HIV Antigen Antibody Combo; Future    Need for hepatitis C screening test  Agrees to screening  - Hepatitis C Screen Reflex to HCV RNA Quant and Genotype; Future    Cervical cancer screening  Discussed risks/benefits.  She declines despite my coubseling    Type 2 diabetes mellitus without complication, without long-term current use of insulin (H)  Diet controlled.  She is currently on no diabetes medications.    She is due for routine blood work  - HEMOGLOBIN A1C; Future  - Lipid Profile; Future    Screening for hyperlipidemia  Due for updating labs  - Lipid Profile; Future  0956}     BMI:   Estimated body mass index is 45.9 kg/m  as calculated from the following:    Height as of this encounter: 1.524 m (5').    Weight as of this encounter: 106.6 kg (235 lb).           Return in about 7 months (around 10/9/2023).    Miroslava Solorzano MD  [facility] [place] Hu is a 62 year old female, presenting for the following health issues:  No chief complaint on file.    HPI      Cefadroxil given, knee is less tender and less swollen.  No recorded fevers.  Able to bear weight on the knee well,     Went to dentist a few months ago and did an X-ray.  No soft tissue, Feels like there is something in her throat.  Feels pressure in her throat.  No pain and no loose teeth, No sensitivity, no pain when chewing on something.  Will finish her course of antibiotics  [facility]-  Okay for patient.        Concern - Cellulitis  Onset: 2/27/23  Description: Right lower knee  Intensity: moderate  Progression of Symptoms:  improving  Accompanying Signs & Symptoms: Redness  Previous history of similar problem: Yes  Precipitating factors:        Worsened by: NA  Alleviating factors:        Improved by: abx  Therapies tried and outcome: Rocephin      Review of Systems    Constitutional, HEENT, cardiovascular, pulmonary, gi and gu systems are negative, except as otherwise noted.      Objective    /70 (BP Location: Left arm, Patient Position: Sitting, Cuff Size: Adult Large)   Pulse 101   Temp 98.1  F (36.7  C) (Tympanic)   Resp 16   Ht 1.524 m (5')   Wt 106.6 kg (235 lb)   SpO2 98%   BMI 45.90 kg/m    Body mass index is 45.9 kg/m .  Physical Exam   GENERAL: healthy, alert and no distress  EYES: Eyes grossly normal to inspection, PERRL and conjunctivae and sclerae normal  HENT: ear canals and TM's normal, nose and mouth without ulcers or lesions  NECK: no adenopathy, no asymmetry, masses, or scars and thyroid normal to palpation  RESP: lungs clear to auscultation - no rales, rhonchi or wheezes  CV: regular rate and rhythm, normal S1 S2, no S3 or S4, no murmur, click or rub, no peripheral edema and peripheral pulses strong  ABDOMEN: soft, nontender, no hepatosplenomegaly, no masses and bowel sounds normal  MS: no gross musculoskeletal defects noted, no edema  SKIN: no suspicious lesions or rashes  NEURO: Normal strength and tone, mentation intact and speech normal  PSYCH: mentation appears  normal, affect normal/bright

## 2023-03-15 ENCOUNTER — HOSPITAL ENCOUNTER (OUTPATIENT)
Dept: PHYSICAL THERAPY | Facility: HOSPITAL | Age: 63
Setting detail: THERAPIES SERIES
Discharge: HOME | End: 2023-03-15
Attending: STUDENT IN AN ORGANIZED HEALTH CARE EDUCATION/TRAINING PROGRAM
Payer: COMMERCIAL

## 2023-03-15 DIAGNOSIS — M25.562 BILATERAL CHRONIC KNEE PAIN: ICD-10-CM

## 2023-03-15 DIAGNOSIS — G89.29 BILATERAL CHRONIC KNEE PAIN: ICD-10-CM

## 2023-03-15 DIAGNOSIS — M25.561 BILATERAL CHRONIC KNEE PAIN: ICD-10-CM

## 2023-03-15 PROCEDURE — 97162 PT EVAL MOD COMPLEX 30 MIN: CPT | Mod: GP

## 2023-03-15 PROCEDURE — 97110 THERAPEUTIC EXERCISES: CPT | Mod: GP

## 2023-03-16 ASSESSMENT — ACTIVITIES OF DAILY LIVING (ADL)
WEAKNESS: THE SYMPTOM AFFECTS MY ACTIVITY MODERATELY
KNEE_ACTIVITY_OF_DAILY_LIVING_SUM: 23
KNEEL ON THE FRONT OF YOUR KNEE: I AM UNABLE TO DO THE ACTIVITY
GIVING WAY, BUCKLING OR SHIFTING OF KNEE: THE SYMPTOM AFFECTS MY ACTIVITY SEVERELY
AS_A_RESULT_OF_YOUR_KNEE_INJURY,_HOW_WOULD_YOU_RATE_YOUR_CURRENT_LEVEL_OF_DAILY_ACTIVITY?: ABNORMAL
STIFFNESS: THE SYMPTOM AFFECTS MY ACTIVITY SEVERELY
PAIN: THE SYMPTOM AFFECTS MY ACTIVITY MODERATELY
SIT WITH YOUR KNEE BENT: I AM UNABLE TO DO THE ACTIVITY
LIMPING: THE SYMPTOM AFFECTS MY ACTIVITY SLIGHTLY
HOW_WOULD_YOU_RATE_THE_OVERALL_FUNCTION_OF_YOUR_KNEE_DURING_YOUR_USUAL_DAILY_ACTIVITIES?: ABNORMAL
KNEE_ACTIVITY_OF_DAILY_LIVING_SCORE: 32.86
SWELLING: I DO NOT HAVE THE SYMPTOM
GO DOWN STAIRS: ACTIVITY IS VERY DIFFICULT
SQUAT: I AM UNABLE TO DO THE ACTIVITY
RISE FROM A CHAIR: ACTIVITY IS FAIRLY DIFFICULT
WALK: ACTIVITY IS SOMEWHAT DIFFICULT
STAND: ACTIVITY IS VERY DIFFICULT
GO UP STAIRS: ACTIVITY IS FAIRLY DIFFICULT
HOW_WOULD_YOU_RATE_THE_CURRENT_FUNCTION_OF_YOUR_KNEE_DURING_YOUR_USUAL_DAILY_ACTIVITIES_ON_A_SCALE_FROM_0_TO_100_WITH_100_BEING_YOUR_LEVEL_OF_KNEE_FUNCTION_PRIOR_TO_YOUR_INJURY_AND_0_BEING_THE_INABILITY_TO_PERFORM_ANY_OF_YOUR_USUAL_DAILY_ACTIVITIES?: 50
RAW_SCORE: 23

## 2023-03-16 NOTE — PROGRESS NOTES
03/15/23 1300   General Information   Type of Visit Initial OP Ortho PT Evaluation   Start of Care Date 03/15/23   Referring Physician Miroslava Solorzano MD   Orders Evaluate and Treat   Date of Order 03/09/23   Certification Required? Yes   Medical Diagnosis Chronic B knee pain   Surgical/Medical history reviewed Yes   Precautions/Limitations no known precautions/limitations   Weight-Bearing Status - LLE full weight-bearing   Weight-Bearing Status - RLE full weight-bearing   Body Part(s)   Body Part(s) Knee   Presentation and Etiology   Pertinent history of current problem (include personal factors and/or comorbidities that impact the POC) Fell on 2/9/23 onto both knees, developed cellulitis in R knee but is done with antiobotics now.  Has lost 180# over 4 years but feels she has not regained the strength in her legs with the weight loss.   Impairments A. Pain;C. Swelling;D. Decreased ROM;E. Decreased flexibility;F. Decreased strength and endurance;G. Impaired balance;H. Impaired gait;I. Impaired skin integrity;B. Decreased WB tolerance   Functional Limitations perform activities of daily living;perform desired leisure / sports activities   Symptom Location Bilateral knees   How/Where did it occur From Degenerative Joint Disease   Onset date of current episode/exacerbation 03/09/23   Chronicity Chronic   Pain rating (0-10 point scale) Best (/10);Worst (/10)   Best (/10) 0   Worst (/10) 8   Pain quality C. Aching;E. Shooting   Frequency of pain/symptoms C. With activity   Pain/symptoms are: The same all the time   Pain/symptoms exacerbated by B. Walking;G. Certain positions;I. Bending   Pain/symptoms eased by C. Rest;I. OTC medication(s)   Progression of symptoms since onset: Worsened   Current / Previous Interventions   Diagnostic Tests: X-ray   X-ray Results Results   X-ray results Severe osteoarthritic changes in R knee   Prior Level of Function   Prior Level of Function-Mobility Independent   Prior Level of  Function-ADLs Independent   Functional Level Prior Comment Has made modifications in how she accomplishes tasks secondary to  knee pain and ROM limitations   Current Level of Function   Current Community Support Family/friend caregiver   Patient role/employment history E. Unemployed   Home/community accessibility 7 +4 steps, goes down basement stairs backward   Current equipment-Gait/Locomotion Manual wheelchair;Standard cane;Walker   Current equipment-ADL Shower/tub grab bar   Fall Risk Screen   Fall screen completed by PT   Have you fallen 2 or more times in the past year? No   Have you fallen and had an injury in the past year? Yes   Is patient a fall risk? Yes;Department fall risk interventions implemented   Abuse Screen (yes response referral indicated)   Feels Unsafe at Home or Work/School no   Feels Threatened by Someone no   Does Anyone Try to Keep You From Having Contact with Others or Doing Things Outside Your Home? no   Physical Signs of Abuse Present no   System Outcome Measures   Outcome Measures   (KOS ADL scale score of 32.86)   Knee Objective Findings   Gait/Locomotion Pt ambulates using hip hiking and increased hip swing to progress LE forward, with limited knee flexion and extension throughout gait cycle   Knee ROM Comment Hard end feel on R; very limited and painful at end range of motion   Knee/Hip Strength Comments Hip strength grossly -4-4/5 throughout   Palpation Pain at medial and lateral joint line and over the patella bilaterally   Observation Pt is unable to bend knees in sitting, gait is very stiff and antalgic   Integumentary  Discoloration noted over anterior R knee from recent cellulitis   Side (if bilateral, select both right and left) Right;Left   Right Knee Extension AROM -20   Right Knee Extension PROM -20   Left Knee Extension AROM -16   Left Knee Extension PROM -16   Right Knee Flexion AROM 89   Right Knee Flexion PROM 90   Left Knee Flexion AROM 112   Left Knee Flexion PROM 115    Right Knee Flexion Strength 4-   Left Knee Flexion Strength 4-   Right Knee Extension Strength 3-   Left Knee Extension Strength 3-   Right Hip Abduction Strength 4   Left Hip Abduction Strength 4   Right Hamstring Flexibility Decreased   Left Hamstring Flexibility Decreased   Right Hip Flexor Flexibility Decreased   Left Hip Flexor Flexibility Decreased   Planned Therapy Interventions   Planned Therapy Interventions gait training;joint mobilization;manual therapy;ROM;strengthening;stretching   Planned Modality Interventions   Planned Modality Interventions Hot packs;Cryotherapy   Clinical Impression   Criteria for Skilled Therapeutic Interventions Met yes, treatment indicated   PT Diagnosis Bilateral knee pain   Influenced by the following impairments pain, decreased ROM, decreased flexibility   Functional limitations due to impairments decreased standing and ambulation tolerance, decreased ability to negotiate stairs   Clinical Presentation Stable/Uncomplicated   Clinical Presentation Rationale Clinical judgement   Clinical Decision Making (Complexity) Moderate complexity   Therapy Frequency 2 times/Week   Predicted Duration of Therapy Intervention (days/wks) 8 weeks   Risk & Benefits of therapy have been explained Yes   Patient, Family & other staff in agreement with plan of care Yes   Clinical Impression Comments Mayte is a 62 y.o. female who presents to PT secondary to lengthy history of increased bilateral knee pain and declining function secondary to osteoarthritis in both knees.  She presents with decreased ROM and strength in both LE and knees, specifically.  Her gait pattern is altered due to decrased ROM and strength, as well as pain.  At this point, pt will definitely benefit from skilled PT to faciliate improved knee and LE strength, ROM and flexibility, but pt will benefit from consultation with orthopedic surgeon to explore additional intervention as well.   Education Assessment   Preferred Learning  Style Demonstration;Pictures/video   Barriers to Learning No barriers   ORTHO GOALS   PT Ortho Eval Goals 1;2;3;4   Ortho Goal 1   Goal Identifier STG 1   Goal Description Pt will tolerate daily HEP without increased pain in B knees.   Target Date 04/13/23   Ortho Goal 2   Goal Identifier STG 2   Goal Description Pt will demonstrate improved knee extension by 5 degrees or more bilaterally   Target Date 04/13/23   Ortho Goal 3   Goal Identifier LTG 1   Goal Description Pt will demonstrate improved B knee strength to 4/5 or greater.   Target Date 05/11/23   Ortho Goal 4   Goal Identifier LTG 2   Goal Description Pt will demonstrate improved B knee flexion be 10 degrees and extension by 10 degrees or more.   Target Date 05/11/23   Total Evaluation Time   PT Eval, Moderate Complexity Minutes (65199) 30   Therapy Certification   Certification date from 03/15/23   Certification date to 05/11/23   Medical Diagnosis B knee pain   I certify the need for these services furnished under this plan of treatment and while under my care. (Physician co-signature of this document indicates review and certification of the therapy plan).

## 2023-03-21 ENCOUNTER — HOSPITAL ENCOUNTER (OUTPATIENT)
Dept: PHYSICAL THERAPY | Facility: HOSPITAL | Age: 63
Setting detail: THERAPIES SERIES
Discharge: HOME | End: 2023-03-21
Attending: STUDENT IN AN ORGANIZED HEALTH CARE EDUCATION/TRAINING PROGRAM
Payer: COMMERCIAL

## 2023-03-21 PROCEDURE — 97110 THERAPEUTIC EXERCISES: CPT | Mod: GP,CQ

## 2023-03-24 ENCOUNTER — HOSPITAL ENCOUNTER (OUTPATIENT)
Dept: PHYSICAL THERAPY | Facility: HOSPITAL | Age: 63
Setting detail: THERAPIES SERIES
Discharge: HOME | End: 2023-03-24
Attending: STUDENT IN AN ORGANIZED HEALTH CARE EDUCATION/TRAINING PROGRAM
Payer: COMMERCIAL

## 2023-03-24 ENCOUNTER — OFFICE VISIT (OUTPATIENT)
Dept: FAMILY MEDICINE | Facility: OTHER | Age: 63
End: 2023-03-24
Attending: STUDENT IN AN ORGANIZED HEALTH CARE EDUCATION/TRAINING PROGRAM
Payer: COMMERCIAL

## 2023-03-24 ENCOUNTER — NURSE TRIAGE (OUTPATIENT)
Dept: FAMILY MEDICINE | Facility: OTHER | Age: 63
End: 2023-03-24

## 2023-03-24 VITALS
OXYGEN SATURATION: 99 % | WEIGHT: 233.5 LBS | HEIGHT: 60 IN | SYSTOLIC BLOOD PRESSURE: 134 MMHG | DIASTOLIC BLOOD PRESSURE: 80 MMHG | TEMPERATURE: 97.8 F | BODY MASS INDEX: 45.84 KG/M2 | HEART RATE: 97 BPM

## 2023-03-24 DIAGNOSIS — M17.11 ARTHRITIS OF RIGHT KNEE: Primary | ICD-10-CM

## 2023-03-24 DIAGNOSIS — L03.115 CELLULITIS OF RIGHT KNEE: ICD-10-CM

## 2023-03-24 PROCEDURE — 97110 THERAPEUTIC EXERCISES: CPT | Mod: GP

## 2023-03-24 PROCEDURE — G0463 HOSPITAL OUTPT CLINIC VISIT: HCPCS | Mod: 25

## 2023-03-24 PROCEDURE — 99213 OFFICE O/P EST LOW 20 MIN: CPT | Performed by: STUDENT IN AN ORGANIZED HEALTH CARE EDUCATION/TRAINING PROGRAM

## 2023-03-24 PROCEDURE — G0463 HOSPITAL OUTPT CLINIC VISIT: HCPCS

## 2023-03-24 ASSESSMENT — PAIN SCALES - GENERAL: PAINLEVEL: NO PAIN (0)

## 2023-03-24 NOTE — TELEPHONE ENCOUNTER
"Patient was seen on 3/9/23 Chronic knee pain/cellulitis and 2/27/23  Treated with IM Rocephin and    Cellulitis of right lower extremity  Swelling of anterior knee.  Septic bursitis vs traumatic bursitis.  The knee is quite tender to touch with overlying erythema and circumferential erythema.  There is also significant warmth.  Will treat with dose of Rocephin 2 grams IM   - cefTRIAXone (ROCEPHIN) in NS for IM administration       cefadroxil (DURICEF) 500 MG capsule   2/28/2023  --   Sig - Route: Take 1 capsule by mouth 2 times daily - Oral   Class: Historical   Order: [id number]         Answer Assessment - Initial Assessment Questions  1. LOCATION: \"Where is the swelling located?\"  (e.g., left, right, both knees)      Right knee  2. ONSET: \"When did the swelling start?\" \"Does it come and go, or is it there all the time?\"      Was treated with antibiotics 2 weeks ago.  PT on Tuesday it was purple and feeling painful  3. SWELLING: \"How bad is the swelling?\" Or, \"How large is it?\" (e.g., mild, moderate, severe; size of localized swelling)     - NONE: No joint swelling.    - LOCALIZED: Localized; small area of puffy or swollen skin (e.g., insect bite, skin irritation).    - MILD: Joint looks or feels mildly swollen or puffy.    - MODERATE: Swollen; interferes with normal activities (e.g., work or school); can't move joint normally (bend and straighten completely); may be limping.    - SEVERE: Very swollen; can't move swollen joint at all; limping a lot or unable to walk.      Mild swelling purple in color  4. PAIN: \"Is there any pain?\" If Yes, ask: \"How bad is it?\" (Scale 1-10; or mild, moderate, severe)    - NONE (0): no pain.    - MILD (1-3): doesn't interfere with normal activities.     - MODERATE (4-7): interferes with normal activities (e.g., work or school) or awakens from sleep, limping.     - SEVERE (8-10): excruciating pain, unable to do any normal activities, unable to walk.       3/0  5. SETTING: \"Has there " "been any recent work, exercise or other activity that involved that part of the body?\"       no  6. AGGRAVATING FACTORS: \"What makes the knee swelling worse?\" (e.g., walking, climbing stairs, running)      no  7. ASSOCIATED SYMPTOMS: \"Is there any pain or redness?\"      purple  8. OTHER SYMPTOMS: \"Do you have any other symptoms?\" (e.g., chest pain, difficulty breathing, fever, calf pain)      no  9. PREGNANCY: \"Is there any chance you are pregnant?\" \"When was your last menstrual period?\"      no    Protocols used: KNEE SWELLING-A-OH    "

## 2023-03-24 NOTE — PROGRESS NOTES
Assessment & Plan     Arthritis of right knee  Discussed management options.    Consider steroid injections.  Consult with orthopedics for consideration of knee replacement  Encourage staying active and weight management.  Patient is actively working toward weight loss.  - Miscellaneous Order for DME - ONLY FOR DME    Cellulitis of right knee  Appears resolved.  There is mild skin discoloration from the previous presumed pre-patellar bursitis.    Patient was evaluated by orthopedics for this and given course of oral antibiotics  There are currently no clinical S/S of cellulitis.    Reassurance provided as well as anticipatory guidance for patient.         BMI:   Estimated body mass index is 45.6 kg/m  as calculated from the following:    Height as of this encounter: 1.524 m (5').    Weight as of this encounter: 105.9 kg (233 lb 8 oz).   Weight management plan: Discussed healthy diet and exercise guidelines      No follow-ups on file.    Miroslava Solorzano MD  [facility] - CAITLIN Hu is a 62 year old, presenting for the following health issues:  Follow Up (Right knee infection )  No flowsheet data found.  HPI     Concern - follow up right knee infection   Onset: fell on 2/9/2023  Description: fell, and ended up getting a big bump on knee, was swollen got red and purple with pain  Intensity: mild now   Progression of Symptoms:  Improving- thinks she might have aggravated it- is getting purple again   Accompanying Signs & Symptoms: purple  Again otherwise none   Previous history of similar problem: none   Precipitating factors:        Worsened by: over using it   Alleviating factors:        Improved by: none  Therapies tried and outcome: physical therapy,           Review of Systems         Objective    /80 (BP Location: Right arm, Patient Position: Sitting, Cuff Size: Adult Regular)   Pulse 97   Temp 97.8  F (36.6  C) (Tympanic)   Ht 1.524 m (5')   Wt 105.9 kg (233 lb 8 oz)    SpO2 99%   BMI 45.60 kg/m    Body mass index is 45.6 kg/m .  Physical Exam   GENERAL: healthy, alert and no distress  EYES: Eyes grossly normal to inspection, PERRL and conjunctivae and sclerae normal  HENT: ear canals and TM's normal, nose and mouth without ulcers or lesions  NECK: no adenopathy, no asymmetry, masses, or scars and thyroid normal to palpation  RESP: lungs clear to auscultation - no rales, rhonchi or wheezes  CV: regular rate and rhythm, normal S1 S2, no S3 or S4, no murmur, click or rub, no peripheral edema and peripheral pulses strong  ABDOMEN: soft, nontender, no hepatosplenomegaly, no masses and bowel sounds normal  MS: no gross musculoskeletal defects noted, no edema, normal active and passive range of motion of right knee.  No swelling, erythema.  Some purple-tinged discoloration of the anterior right knee (pre-patellar).  No signs of cellulitis or deeper tissue infection.    SKIN: no suspicious lesions or rashes  NEURO: Normal strength and tone, mentation intact and speech normal  PSYCH: mentation appears normal, affect normal/bright

## 2023-03-28 ENCOUNTER — HOSPITAL ENCOUNTER (OUTPATIENT)
Dept: PHYSICAL THERAPY | Facility: HOSPITAL | Age: 63
Setting detail: THERAPIES SERIES
Discharge: HOME | End: 2023-03-28
Attending: STUDENT IN AN ORGANIZED HEALTH CARE EDUCATION/TRAINING PROGRAM
Payer: COMMERCIAL

## 2023-03-28 PROCEDURE — 97110 THERAPEUTIC EXERCISES: CPT | Mod: GP

## 2023-03-31 ENCOUNTER — HOSPITAL ENCOUNTER (OUTPATIENT)
Dept: PHYSICAL THERAPY | Facility: HOSPITAL | Age: 63
Setting detail: THERAPIES SERIES
Discharge: HOME | End: 2023-03-31
Attending: STUDENT IN AN ORGANIZED HEALTH CARE EDUCATION/TRAINING PROGRAM
Payer: COMMERCIAL

## 2023-03-31 PROCEDURE — 97110 THERAPEUTIC EXERCISES: CPT | Mod: GP,CQ

## 2023-04-04 ENCOUNTER — HOSPITAL ENCOUNTER (OUTPATIENT)
Dept: PHYSICAL THERAPY | Facility: HOSPITAL | Age: 63
Setting detail: THERAPIES SERIES
Discharge: HOME | End: 2023-04-04
Attending: STUDENT IN AN ORGANIZED HEALTH CARE EDUCATION/TRAINING PROGRAM
Payer: COMMERCIAL

## 2023-04-04 PROCEDURE — 97110 THERAPEUTIC EXERCISES: CPT | Mod: GP

## 2023-04-06 ENCOUNTER — HOSPITAL ENCOUNTER (OUTPATIENT)
Dept: PHYSICAL THERAPY | Facility: HOSPITAL | Age: 63
Setting detail: THERAPIES SERIES
Discharge: HOME | End: 2023-04-06
Attending: STUDENT IN AN ORGANIZED HEALTH CARE EDUCATION/TRAINING PROGRAM
Payer: COMMERCIAL

## 2023-04-06 PROCEDURE — 97110 THERAPEUTIC EXERCISES: CPT | Mod: GP

## 2023-04-11 ENCOUNTER — HOSPITAL ENCOUNTER (OUTPATIENT)
Dept: PHYSICAL THERAPY | Facility: HOSPITAL | Age: 63
Setting detail: THERAPIES SERIES
Discharge: HOME | End: 2023-04-11
Attending: STUDENT IN AN ORGANIZED HEALTH CARE EDUCATION/TRAINING PROGRAM
Payer: COMMERCIAL

## 2023-04-11 PROCEDURE — 97110 THERAPEUTIC EXERCISES: CPT | Mod: GP

## 2023-04-14 ENCOUNTER — HOSPITAL ENCOUNTER (OUTPATIENT)
Dept: PHYSICAL THERAPY | Facility: HOSPITAL | Age: 63
Setting detail: THERAPIES SERIES
Discharge: HOME | End: 2023-04-14
Attending: STUDENT IN AN ORGANIZED HEALTH CARE EDUCATION/TRAINING PROGRAM
Payer: COMMERCIAL

## 2023-04-14 PROCEDURE — 97110 THERAPEUTIC EXERCISES: CPT | Mod: GP,CQ

## 2023-04-18 ENCOUNTER — HOSPITAL ENCOUNTER (OUTPATIENT)
Dept: PHYSICAL THERAPY | Facility: HOSPITAL | Age: 63
Setting detail: THERAPIES SERIES
Discharge: HOME | End: 2023-04-18
Attending: STUDENT IN AN ORGANIZED HEALTH CARE EDUCATION/TRAINING PROGRAM
Payer: COMMERCIAL

## 2023-04-18 PROCEDURE — 97110 THERAPEUTIC EXERCISES: CPT | Mod: GP,CQ

## 2023-04-21 ENCOUNTER — HOSPITAL ENCOUNTER (OUTPATIENT)
Dept: PHYSICAL THERAPY | Facility: HOSPITAL | Age: 63
Setting detail: THERAPIES SERIES
Discharge: HOME | End: 2023-04-21
Attending: STUDENT IN AN ORGANIZED HEALTH CARE EDUCATION/TRAINING PROGRAM
Payer: COMMERCIAL

## 2023-04-21 PROCEDURE — 97110 THERAPEUTIC EXERCISES: CPT | Mod: GP,CQ

## 2023-04-24 NOTE — PROGRESS NOTES
[facility]    Outpatient Physical Therapy Progress Note  Patient: Mayte Parmar  : 1960    Beginning/End Dates of Reporting Period:  3/15/23 to 23    Referring Provider: Miroslava Solorzano MD    Therapy Diagnosis: Bilateral knee pain     Client Self Report: Pt states her knees were sore after last time for a couple days. No other issues. 7:30-8:00.    Objective Measurements:      AROM B knees:  AROM supine left knee ext 0/-7/117,right knee 0/-10/93    MMT:  Left leg -4/5, right 4/5 seated knee ext, knee flexion.          Goals:  Goal Identifier STG 1   Goal Description Pt will tolerate daily HEP without increased pain in B knees.   Target Date 23   Date Met   23   Progress (detail required for progress note):       Goal Identifier STG 2   Goal Description Pt will demonstrate improved knee extension by 5 degrees or more bilaterally   Target Date 23   Date Met   23   Progress (detail required for progress note):       Goal Identifier LTG 1   Goal Description Pt will demonstrate improved B knee strength to 4/5 or greater.   Target Date 05/10/23   Date Met      Progress (detail required for progress note):  Progressing well, continue goal as written.     Goal Identifier LTG 2   Goal Description Pt will demonstrate improved B knee flexion be 10 degrees and extension by 10 degrees or more.   Target Date 05/10/23   Date Met      Progress (detail required for progress note):  Good progress, continue goal as written.     Pt has been seen x 10 visits for PT to address chronic bilateral knee pain, with limitations in strength and ROM.  She is making very good progress with therapeutic interventions as noted in improved AROM bilaterally as well as improved strength.  She will benefit from continued skilled PT through established POC to further progress strength, ROM, and standing activity  tolerance.    Plan:  Continue therapy per current plan of care.    Discharge:  No

## 2023-04-25 ENCOUNTER — HOSPITAL ENCOUNTER (OUTPATIENT)
Dept: PHYSICAL THERAPY | Facility: HOSPITAL | Age: 63
Setting detail: THERAPIES SERIES
Discharge: HOME | End: 2023-04-25
Attending: STUDENT IN AN ORGANIZED HEALTH CARE EDUCATION/TRAINING PROGRAM
Payer: COMMERCIAL

## 2023-04-25 PROCEDURE — 97110 THERAPEUTIC EXERCISES: CPT | Mod: GP,CQ

## 2023-04-28 ENCOUNTER — HOSPITAL ENCOUNTER (OUTPATIENT)
Dept: PHYSICAL THERAPY | Facility: HOSPITAL | Age: 63
Setting detail: THERAPIES SERIES
Discharge: HOME | End: 2023-04-28
Attending: STUDENT IN AN ORGANIZED HEALTH CARE EDUCATION/TRAINING PROGRAM
Payer: COMMERCIAL

## 2023-04-28 PROCEDURE — 97110 THERAPEUTIC EXERCISES: CPT | Mod: GP,CQ

## 2023-05-05 ENCOUNTER — HOSPITAL ENCOUNTER (OUTPATIENT)
Dept: PHYSICAL THERAPY | Facility: HOSPITAL | Age: 63
Setting detail: THERAPIES SERIES
Discharge: HOME | End: 2023-05-05
Attending: STUDENT IN AN ORGANIZED HEALTH CARE EDUCATION/TRAINING PROGRAM
Payer: COMMERCIAL

## 2023-05-05 PROCEDURE — 97110 THERAPEUTIC EXERCISES: CPT | Mod: GP

## 2023-05-05 NOTE — PROGRESS NOTES
[facility]    Outpatient Physical Therapy Discharge Note  Patient: Mayte Parmar  : 1960    Beginning/End Dates of Reporting Period:  3/15/23 to 23    Referring Provider: Miroslava Solorzano MD    Therapy Diagnosis: Bilateral knee pain     Client Self Report: Pt reports she feels ready to proceed with HEP on her own now.  She is interested in the WEL program; information was given.    Objective Measurements:  Objective Measure: AROM bilateral knees  Details: L knee flex 120/ext -12 degrees; R knee flex 101/ext -10 degrees  Objective Measure: MMT  Details: Bilaterall LE 4/5    Goals:  Goal Identifier STG 1   Goal Description Pt will tolerate daily HEP without increased pain in B knees.   Target Date 23   Date Met  23   Progress (detail required for progress note):       Goal Identifier STG 2   Goal Description Pt will demonstrate improved knee extension by 5 degrees or more bilaterally   Target Date 23   Date Met  23   Progress (detail required for progress note):       Goal Identifier LTG 1   Goal Description Pt will demonstrate improved B knee strength to 4/5 or greater.   Target Date 05/10/23   Date Met  23   Progress (detail required for progress note):       Goal Identifier LTG 2   Goal Description Pt will demonstrate improved B knee flexion be 10 degrees and extension by 10 degrees or more.   Target Date 05/10/23   Date Met  23   Progress (detail required for progress note): Very good progress, and met for bilateral knee flexion and R knee extension.  L knee extension improved to -12 degrees, from -16 degrees     Pt has been seen for a total of 14 PT visits to address bilateral knee pain, decreased strength and decreased ROM.  Pt has made excellent progress with PT intervention and HEP compliance, improving B knee ROM and bilateral LE strength significantly.  Pt's  overall activity tolerance has improved as her strength and ROM have improved.  Pt hs met all of her goals for PT and is ready to continue with HEP.  Information regarding the WEL program has been given, and pt is interested in continuing with that program.  Pt is discharged from skilled PT at this time due to meeting all goals.      Plan:  Discharge from therapy.    Discharge:    Reason for Discharge: Patient has met all goals.    Discharge Plan: Patient to continue home program.  WEL program

## 2023-07-10 ENCOUNTER — NURSE TRIAGE (OUTPATIENT)
Dept: NURSING | Facility: CLINIC | Age: 63
End: 2023-07-10

## 2023-07-10 ENCOUNTER — HOSPITAL ENCOUNTER (EMERGENCY)
Facility: HOSPITAL | Age: 63
Discharge: HOME | End: 2023-07-10
Attending: STUDENT IN AN ORGANIZED HEALTH CARE EDUCATION/TRAINING PROGRAM | Admitting: STUDENT IN AN ORGANIZED HEALTH CARE EDUCATION/TRAINING PROGRAM
Payer: COMMERCIAL

## 2023-07-10 VITALS
SYSTOLIC BLOOD PRESSURE: 108 MMHG | RESPIRATION RATE: 16 BRPM | OXYGEN SATURATION: 93 % | DIASTOLIC BLOOD PRESSURE: 62 MMHG | HEART RATE: 60 BPM | TEMPERATURE: 96.9 F

## 2023-07-10 DIAGNOSIS — R00.2 PALPITATIONS: ICD-10-CM

## 2023-07-10 DIAGNOSIS — N30.00 ACUTE CYSTITIS WITHOUT HEMATURIA: ICD-10-CM

## 2023-07-10 LAB
ALBUMIN UR-MCNC: NEGATIVE MG/DL
ANION GAP SERPL CALCULATED.3IONS-SCNC: 13 MMOL/L (ref 7–15)
APPEARANCE UR: CLEAR
BASOPHILS # BLD AUTO: 0 10E3/UL (ref 0–0.2)
BASOPHILS NFR BLD AUTO: 1 %
BILIRUB UR QL STRIP: NEGATIVE
BUN SERPL-MCNC: 18.1 MG/DL (ref 8–23)
CALCIUM SERPL-MCNC: 9.4 MG/DL (ref 8.8–10.2)
CHLORIDE SERPL-SCNC: 102 MMOL/L (ref 98–107)
COLOR UR AUTO: ABNORMAL
CREAT SERPL-MCNC: 0.73 MG/DL (ref 0.51–0.95)
DEPRECATED HCO3 PLAS-SCNC: 23 MMOL/L (ref 22–29)
EOSINOPHIL # BLD AUTO: 0.1 10E3/UL (ref 0–0.7)
EOSINOPHIL NFR BLD AUTO: 1 %
ERYTHROCYTE [DISTWIDTH] IN BLOOD BY AUTOMATED COUNT: 13.9 % (ref 10–15)
GFR SERPL CREATININE-BSD FRML MDRD: >90 ML/MIN/1.73M2
GLUCOSE SERPL-MCNC: 108 MG/DL (ref 70–99)
GLUCOSE UR STRIP-MCNC: NEGATIVE MG/DL
HCT VFR BLD AUTO: 41.9 % (ref 35–47)
HGB BLD-MCNC: 14 G/DL (ref 11.7–15.7)
HGB UR QL STRIP: NEGATIVE
HOLD SPECIMEN: NORMAL
IMM GRANULOCYTES # BLD: 0 10E3/UL
IMM GRANULOCYTES NFR BLD: 1 %
KETONES UR STRIP-MCNC: NEGATIVE MG/DL
LEUKOCYTE ESTERASE UR QL STRIP: ABNORMAL
LYMPHOCYTES # BLD AUTO: 1.4 10E3/UL (ref 0.8–5.3)
LYMPHOCYTES NFR BLD AUTO: 21 %
MCH RBC QN AUTO: 30.7 PG (ref 26.5–33)
MCHC RBC AUTO-ENTMCNC: 33.4 G/DL (ref 31.5–36.5)
MCV RBC AUTO: 92 FL (ref 78–100)
MONOCYTES # BLD AUTO: 0.5 10E3/UL (ref 0–1.3)
MONOCYTES NFR BLD AUTO: 7 %
NEUTROPHILS # BLD AUTO: 4.6 10E3/UL (ref 1.6–8.3)
NEUTROPHILS NFR BLD AUTO: 69 %
NITRATE UR QL: NEGATIVE
NRBC # BLD AUTO: 0 10E3/UL
NRBC BLD AUTO-RTO: 0 /100
PH UR STRIP: 5.5 [PH] (ref 4.7–8)
PLATELET # BLD AUTO: 227 10E3/UL (ref 150–450)
POTASSIUM SERPL-SCNC: 3.5 MMOL/L (ref 3.4–5.3)
RBC # BLD AUTO: 4.56 10E6/UL (ref 3.8–5.2)
RBC URINE: 1 /HPF
SODIUM SERPL-SCNC: 138 MMOL/L (ref 136–145)
SP GR UR STRIP: 1.01 (ref 1–1.03)
SQUAMOUS EPITHELIAL: 3 /HPF
TRANSITIONAL EPI: <1 /HPF
TROPONIN T SERPL HS-MCNC: 8 NG/L
UROBILINOGEN UR STRIP-MCNC: NORMAL MG/DL
WBC # BLD AUTO: 6.7 10E3/UL (ref 4–11)
WBC URINE: 7 /HPF

## 2023-07-10 PROCEDURE — 93005 ELECTROCARDIOGRAM TRACING: CPT

## 2023-07-10 PROCEDURE — 80048 BASIC METABOLIC PNL TOTAL CA: CPT | Performed by: STUDENT IN AN ORGANIZED HEALTH CARE EDUCATION/TRAINING PROGRAM

## 2023-07-10 PROCEDURE — 85025 COMPLETE CBC W/AUTO DIFF WBC: CPT | Performed by: STUDENT IN AN ORGANIZED HEALTH CARE EDUCATION/TRAINING PROGRAM

## 2023-07-10 PROCEDURE — 84484 ASSAY OF TROPONIN QUANT: CPT | Performed by: STUDENT IN AN ORGANIZED HEALTH CARE EDUCATION/TRAINING PROGRAM

## 2023-07-10 PROCEDURE — 81001 URINALYSIS AUTO W/SCOPE: CPT | Performed by: STUDENT IN AN ORGANIZED HEALTH CARE EDUCATION/TRAINING PROGRAM

## 2023-07-10 PROCEDURE — 99284 EMERGENCY DEPT VISIT MOD MDM: CPT

## 2023-07-10 PROCEDURE — 93010 ELECTROCARDIOGRAM REPORT: CPT | Performed by: INTERNAL MEDICINE

## 2023-07-10 PROCEDURE — 99284 EMERGENCY DEPT VISIT MOD MDM: CPT | Performed by: STUDENT IN AN ORGANIZED HEALTH CARE EDUCATION/TRAINING PROGRAM

## 2023-07-10 PROCEDURE — 87086 URINE CULTURE/COLONY COUNT: CPT | Performed by: STUDENT IN AN ORGANIZED HEALTH CARE EDUCATION/TRAINING PROGRAM

## 2023-07-10 PROCEDURE — 36415 COLL VENOUS BLD VENIPUNCTURE: CPT | Performed by: STUDENT IN AN ORGANIZED HEALTH CARE EDUCATION/TRAINING PROGRAM

## 2023-07-10 RX ORDER — NITROFURANTOIN 25; 75 MG/1; MG/1
100 CAPSULE ORAL 2 TIMES DAILY
Qty: 10 CAPSULE | Refills: 0 | Status: SHIPPED | OUTPATIENT
Start: 2023-07-10 | End: 2023-07-15

## 2023-07-10 ASSESSMENT — ACTIVITIES OF DAILY LIVING (ADL): ADLS_ACUITY_SCORE: 35

## 2023-07-10 NOTE — TELEPHONE ENCOUNTER
Patient has been having some vibrating in her chest and some changes in her breathing. Patient states it felt like a phone vibrating on her chest but no one was near her. It lasted for a couple of minutes. Over the past couple days patient has not been chilled but has felt shivery. The sensation comes and goes and will last for a couple minutes. Patient has noticed a new intolerance to activity recently.   Denies chest pain but states she is very aware of her chest and her breathing. Patient unable to find her pulse but feels like it is going fast and she is at rest.  Protocol recommends ED    can drive her to the ED now.   Rashmi Luis RN   07/10/23 6:08 AM  [facility] Nurse Advisor    Reason for Disposition    [1] Heart beating very rapidly (e.g., > 140 / minute) AND [2] present now  (Exception: during exercise)    Additional Information    Negative: Passed out (i.e., lost consciousness, collapsed and was not responding)    Negative: Shock suspected (e.g., cold/pale/clammy skin, too weak to stand, low BP, rapid pulse)    Negative: Difficult to awaken or acting confused (e.g., disoriented, slurred speech)    Negative: Visible sweat on face or sweat dripping down face    Negative: Unable to walk, or can only walk with assistance (e.g., requires support)    Negative: [1] Received SHOCK from implantable cardiac defibrillator AND [2] persisting symptoms (i.e., palpitations, lightheadedness)    Negative: [1] Dizziness, lightheadedness, or weakness AND [2] heart beating very rapidly (e.g., > 140 / minute)    Negative: [1] Dizziness, lightheadedness, or weakness AND [2] heart beating very slowly (e.g., < 50 / minute)    Negative: Sounds like a life-threatening emergency to the triager    Negative: Chest pain    Negative: Implantable Cardiac Defibrillator (ICD) or a pacemaker symptoms or questions    Negative: Difficulty breathing    Negative: Dizziness, lightheadedness, or weakness    Protocols used: HEART  RATE AND HEARTBEAT [id number]

## 2023-07-10 NOTE — ED PROVIDER NOTES
History     Chief Complaint   Patient presents with     Palpitations     HPI  Mayte Parmar is a 62 year old female with a history of obesity and diabetes who presents to the emergency department today complaining of palpitations    She states the first time she experienced that she was on a trip in [facility] and felt like a vibration in her chest.  It lasted for minutes and then was done.  Since then she has had another 2 smaller events similar both while resting.  She denies chest pain but states that there is a subtle discomfort.  She states she just feels a little bit off.  Some nausea no vomiting no abdominal pain.  She reports some diarrhea.  No lightheadedness.  Denies fevers but notes chills.  No runny nose, no sore throat, no cough.  Denies urinary symptoms.    Allergies:  No Known Allergies    Problem List:    Patient Active Problem List    Diagnosis Date Noted     Morbid obesity (H) 12/24/2019     Priority: Medium     Type 2 diabetes mellitus without complication, without long-term current use of insulin (H) 12/24/2019     Priority: Medium     Idiopathic chronic gout of foot without tophus, unspecified laterality 12/24/2019     Priority: Medium        Past Medical History:    Past Medical History:   Diagnosis Date     Diabetes (H)      Idiopathic chronic gout of foot without tophus, unspecified laterality 12/24/2019     Morbid obesity (H) 12/24/2019     Type 2 diabetes mellitus without complication, without long-term current use of insulin (H) 12/24/2019       Past Surgical History:    Past Surgical History:   Procedure Laterality Date     BREAST BIOPSY, CORE RT/LT Right 2011     DILATION AND CURETTAGE  1987    missed ab     DILATION AND CURETTAGE  1997    missed ab     MAMMOGRAM - HIM SCAN  2011     MASTECTOMY Right 2011    due to infected biospy site. No cancer     PAP DIAGNOSTIC SMEAR  1990       Family History:    Family History   Problem Relation Age of Onset     Obesity Mother      Coronary  Artery Disease Father      Depression Father      Obesity Father      Abdominal Aortic Aneurysm Father 73        cause of death       Social History:  Marital Status:   [2]  Social History     Tobacco Use     Smoking status: Never     Smokeless tobacco: Never   Substance Use Topics     Alcohol use: Never     Drug use: Never        Medications:    nitroFURantoin macrocrystal-monohydrate (MACROBID) 100 MG capsule  cefadroxil (DURICEF) 500 MG capsule  ibuprofen (ADVIL/MOTRIN) 200 MG tablet  indomethacin (INDOCIN) 25 MG capsule          Review of Systems  A complete review of systems was performed and is otherwise negative.     Physical Exam   BP: 105/75  Pulse: 104  Temp: 96.9  F (36.1  C)  Resp: 16  SpO2: 98 %      Physical Exam  Constitutional: Alert and conversant. NAD   HENT: NCAT   Eyes: Normal pupils   Neck: supple   CV: Normal rate, regular rhythm, no murmur   Pulmonary/Chest: Non-labored respirations, clear to auscultation bilaterally   Abdominal: Soft, non-tender, non-distended   MSK: TOMPKINS.   Neuro: Alert and appropriate   Skin: Warm and dry. No diaphoresis. No rashes on exposed skin    Psych: Appropriate mood and affect     ED Course              ED Course as of 07/10/23 0752   Mon Jul 10, 2023   0711 62 female here with palpitations and feeling off.  She does note some chest discomfort since this started which has been going on greater than 6 hours.  We will look for various arrhythmias with EKG and telemetry, if we do not find any, we will proceed to Zio patch.  Considering ACS.  Doubt pneumonia.  Possible viral illness, however, no fever at this time.  UTI remains a possibility despite lack of obvious symptoms given her vague complaints.  Considering electrolyte abnormalities as well with possible diarrhea   0747 Leukocyte Esterase Urine(!): Large  Subtle findings suggestive of possible UTI.  Given the fact that she is feeling under the weather and is having the shivering events, this could all point  towards her body fighting off an infection.  I think it is reasonable at this point to treat with antibiotics.  Will prescribe Macrobid.  No indication here of pyelonephritis.  Patient is appropriate for further outpatient management discharged in stable condition all question answered and return precautions given.   0747 Troponin T, High Sensitivity: 8  Doubt ACS     Procedures               Results for orders placed or performed during the hospital encounter of 07/10/23 (from the past 24 hour(s))   CBC with platelets differential    Narrative    The following orders were created for panel order CBC with platelets differential.  Procedure                               Abnormality         Status                     ---------                               -----------         ------                     CBC with platelets and d...[[id number]]                      Final result                 Please view results for these tests on the individual orders.   Basic metabolic panel   Result Value Ref Range    Sodium 138 136 - 145 mmol/L    Potassium 3.5 3.4 - 5.3 mmol/L    Chloride 102 98 - 107 mmol/L    Carbon Dioxide (CO2) 23 22 - 29 mmol/L    Anion Gap 13 7 - 15 mmol/L    Urea Nitrogen 18.1 8.0 - 23.0 mg/dL    Creatinine 0.73 0.51 - 0.95 mg/dL    Calcium 9.4 8.8 - 10.2 mg/dL    Glucose 108 (H) 70 - 99 mg/dL    GFR Estimate >90 >60 mL/min/1.73m2   CBC with platelets and differential   Result Value Ref Range    WBC Count 6.7 4.0 - 11.0 10e3/uL    RBC Count 4.56 3.80 - 5.20 10e6/uL    Hemoglobin 14.0 11.7 - 15.7 g/dL    Hematocrit 41.9 35.0 - 47.0 %    MCV 92 78 - 100 fL    MCH 30.7 26.5 - 33.0 pg    MCHC 33.4 31.5 - 36.5 g/dL    RDW 13.9 10.0 - 15.0 %    Platelet Count 227 150 - 450 10e3/uL    % Neutrophils 69 %    % Lymphocytes 21 %    % Monocytes 7 %    % Eosinophils 1 %    % Basophils 1 %    % Immature Granulocytes 1 %    NRBCs per 100 WBC 0 <1 /100    Absolute Neutrophils 4.6 1.6 - 8.3 10e3/uL    Absolute Lymphocytes 1.4  0.8 - 5.3 10e3/uL    Absolute Monocytes 0.5 0.0 - 1.3 10e3/uL    Absolute Eosinophils 0.1 0.0 - 0.7 10e3/uL    Absolute Basophils 0.0 0.0 - 0.2 10e3/uL    Absolute Immature Granulocytes 0.0 <=0.4 10e3/uL    Absolute NRBCs 0.0 10e3/uL   Troponin T, High Sensitivity   Result Value Ref Range    Troponin T, High Sensitivity 8 <=14 ng/L   Extra Tube    Narrative    The following orders were created for panel order Extra Tube.  Procedure                               Abnormality         Status                     ---------                               -----------         ------                     Extra Blue Top Tube[[id number]]                              In process                 Extra Red Top Tube[[id number]]                               In process                 Extra Heparinized Syringe[[id number]]                        In process                   Please view results for these tests on the individual orders.   UA with Microscopic reflex to Culture    Specimen: Urine, Midstream   Result Value Ref Range    Color Urine Light Yellow Colorless, Straw, Light Yellow, Yellow    Appearance Urine Clear Clear    Glucose Urine Negative Negative mg/dL    Bilirubin Urine Negative Negative    Ketones Urine Negative Negative mg/dL    Specific Gravity Urine 1.010 1.003 - 1.035    Blood Urine Negative Negative    pH Urine 5.5 4.7 - 8.0    Protein Albumin Urine Negative Negative mg/dL    Urobilinogen Urine Normal Normal, 2.0 mg/dL    Nitrite Urine Negative Negative    Leukocyte Esterase Urine Large (A) Negative    RBC Urine 1 <=2 /HPF    WBC Urine 7 (H) <=5 /HPF    Squamous Epithelials Urine 3 (H) <=1 /HPF    Transitional Epithelials Urine <1 <=1 /HPF    Narrative    Urine Culture ordered based on laboratory criteria       Medications - No data to display    Assessments & Plan (with Medical Decision Making)     I have reviewed the nursing notes.    I have reviewed the findings, diagnosis, plan and need for follow up with the  patient.    New Prescriptions    NITROFURANTOIN MACROCRYSTAL-MONOHYDRATE (MACROBID) 100 MG CAPSULE    Take 1 capsule (100 mg) by mouth 2 times daily for 5 days       Final diagnoses:   Acute cystitis without hematuria   Palpitations       7/10/2023   [facility]     Edy Babin MD  07/10/23 0752

## 2023-07-10 NOTE — DISCHARGE INSTRUCTIONS
Return to the emergency department for worsening symptoms or new concerning symptoms.  Take the antibiotics as prescribed.  Get the Zio patch placed as soon as possible and go over the results with your primary care provider after they are finished.  Remember to write down the date and time of any specific events.

## 2023-07-10 NOTE — ED TRIAGE NOTES
Patient states she has had palpitations since last Thursday and feels winded.     Triage Assessment     Row Name 07/10/23 0649       Triage Assessment (Adult)    Airway WDL WDL       Respiratory WDL    Respiratory WDL WDL       Skin Circulation/Temperature WDL    Skin Circulation/Temperature WDL WDL       Peripheral/Neurovascular WDL    Peripheral Neurovascular WDL WDL       Cognitive/Neuro/Behavioral WDL    Cognitive/Neuro/Behavioral WDL WDL

## 2023-07-11 ENCOUNTER — HOSPITAL ENCOUNTER (OUTPATIENT)
Dept: CARDIOLOGY | Facility: HOSPITAL | Age: 63
Discharge: HOME | End: 2023-07-11
Attending: STUDENT IN AN ORGANIZED HEALTH CARE EDUCATION/TRAINING PROGRAM | Admitting: INTERNAL MEDICINE
Payer: COMMERCIAL

## 2023-07-11 DIAGNOSIS — R00.2 PALPITATIONS: ICD-10-CM

## 2023-07-11 LAB — BACTERIA UR CULT: NORMAL

## 2023-07-11 PROCEDURE — 93242 EXT ECG>48HR<7D RECORDING: CPT

## 2023-07-11 PROCEDURE — 93244 EXT ECG>48HR<7D REV&INTERPJ: CPT | Performed by: INTERNAL MEDICINE

## 2023-07-11 NOTE — PATIENT INSTRUCTIONS
Zio patch placed on patient in outpatient appointment today. Patient was instructed to wear patch for 7 days. Skin was prepped and patch was placed following [employer] guidelines .     Patient was instructed to push button when symptomatic and fill out diary. Patient was instructed not to submerse in water and no swimming, saunas, or hot tubs. Showers may be taken keeping back towards the water. If the area DOES become wet pat it dry with a cloth. If skin irritation occurs patient was instructed to remove patch and contact [employer].    Patient understands we do not receive results until they mail patch back inside the box. Staff reminded patient of outside billing notice which was explained to patient during the scheduling process of this monitor. Patient also instructed to contact [employer] with any questions [phone].    Patient had no further questions and agreed with plan. Patient was sent home with the box, information pamphlet and booklet to nolan any incidents in.

## 2024-02-22 ENCOUNTER — TELEPHONE (OUTPATIENT)
Dept: FAMILY MEDICINE | Facility: OTHER | Age: 64
End: 2024-02-22

## 2024-02-22 NOTE — TELEPHONE ENCOUNTER
1:23 PM    Reason for Call: Phone Call    Description: Patient stated dropped off paperwork at  in [place] at registration for work. It is time sensitive and need to be filled out. Please call pt    Was an appointment offered for this call? No  If yes : Appointment type              Date    Preferred method for responding to this message: Telephone Call  What is your phone number ?  [phone]     If we cannot reach you directly, may we leave a detailed response at the number you provided? Yes    Can this message wait until your PCP/provider returns, if available today? Tiffanie Virgen

## 2024-02-26 NOTE — TELEPHONE ENCOUNTER
LVM for patient  Please call back to state what type of paperwork  Writer will check with PCP & inbox when aware of what paperwork was dropped off

## 2024-04-18 ENCOUNTER — TRANSFERRED RECORDS (OUTPATIENT)
Dept: MULTI SPECIALTY CLINIC | Facility: CLINIC | Age: 64
End: 2024-04-18

## 2024-04-18 LAB — RETINOPATHY: NORMAL

## 2024-05-07 ENCOUNTER — OFFICE VISIT (OUTPATIENT)
Dept: FAMILY MEDICINE | Facility: OTHER | Age: 64
End: 2024-05-07
Attending: STUDENT IN AN ORGANIZED HEALTH CARE EDUCATION/TRAINING PROGRAM
Payer: COMMERCIAL

## 2024-05-07 VITALS
TEMPERATURE: 97.8 F | BODY MASS INDEX: 44.61 KG/M2 | HEIGHT: 60 IN | SYSTOLIC BLOOD PRESSURE: 120 MMHG | OXYGEN SATURATION: 99 % | WEIGHT: 227.2 LBS | DIASTOLIC BLOOD PRESSURE: 80 MMHG | HEART RATE: 101 BPM | RESPIRATION RATE: 17 BRPM

## 2024-05-07 DIAGNOSIS — R00.2 PALPITATIONS: Primary | ICD-10-CM

## 2024-05-07 DIAGNOSIS — M25.561 BILATERAL CHRONIC KNEE PAIN: ICD-10-CM

## 2024-05-07 DIAGNOSIS — G89.29 BILATERAL CHRONIC KNEE PAIN: ICD-10-CM

## 2024-05-07 DIAGNOSIS — E66.01 MORBID OBESITY (H): ICD-10-CM

## 2024-05-07 DIAGNOSIS — E11.9 TYPE 2 DIABETES MELLITUS WITHOUT COMPLICATION, WITHOUT LONG-TERM CURRENT USE OF INSULIN (H): ICD-10-CM

## 2024-05-07 DIAGNOSIS — M25.562 BILATERAL CHRONIC KNEE PAIN: ICD-10-CM

## 2024-05-07 DIAGNOSIS — M17.11 ARTHRITIS OF RIGHT KNEE: ICD-10-CM

## 2024-05-07 PROCEDURE — 96372 THER/PROPH/DIAG INJ SC/IM: CPT

## 2024-05-07 PROCEDURE — G0463 HOSPITAL OUTPT CLINIC VISIT: HCPCS

## 2024-05-07 PROCEDURE — 250N000011 HC RX IP 250 OP 636: Performed by: STUDENT IN AN ORGANIZED HEALTH CARE EDUCATION/TRAINING PROGRAM

## 2024-05-07 PROCEDURE — G0463 HOSPITAL OUTPT CLINIC VISIT: HCPCS | Mod: 25

## 2024-05-07 PROCEDURE — 99214 OFFICE O/P EST MOD 30 MIN: CPT | Performed by: STUDENT IN AN ORGANIZED HEALTH CARE EDUCATION/TRAINING PROGRAM

## 2024-05-07 RX ORDER — TRIAMCINOLONE ACETONIDE 40 MG/ML
40 INJECTION, SUSPENSION INTRA-ARTICULAR; INTRAMUSCULAR ONCE
Status: COMPLETED | OUTPATIENT
Start: 2024-05-07 | End: 2024-05-07

## 2024-05-07 RX ADMIN — TRIAMCINOLONE ACETONIDE 40 MG: 40 INJECTION, SUSPENSION INTRA-ARTICULAR; INTRAMUSCULAR at 09:39

## 2024-05-07 ASSESSMENT — PAIN SCALES - GENERAL: PAINLEVEL: SEVERE PAIN (6)

## 2024-05-07 NOTE — PROGRESS NOTES
Assessment & Plan     Arthritis of right knee  XR of right knee reviewed.  Evidence of severe arthritis.    Patient will need to consider TKA in the future.  She would like to try conservative measures first with injection.  She has never had a joint injection in the past  No contraindications to knee injection  R/B/A alternatives discussed.  She would like to proceed with bilateral injections but I would like to try one knee at a time.  She may return for other knee in 2 weeks or so  - triamcinolone (KENALOG-40) injection 40 mg  - lidocaine 1 % 4 mL    Bilateral chronic knee pain  XR of right knee shows severe arthritis.  Pain has been historically worse in the right knee but recently left knee has been bothering her as well.    Palpitations  Results of ziopatch reviewed.  BBB present but overall reassuring.  Only one run of SVT lasting 5 seconds.  She denies any current/recent palpitations.   Patient denies any exertional symptoms    Type 2 diabetes mellitus without complication, without long-term current use of insulin (H)  Diet controlled.  On ketogenic diet and most recent HA1c was in normal range.    Historically HA1c has been as high as >8  Due for annual fasting labs with HA1c.  She tells me due to insurance change, she has to wait until October to get this done  Order placed for fasting labs to be done prior to physical in October  - Hemoglobin A1c; Future  - CBC with platelets and differential; Future  - Comprehensive metabolic panel; Future  - TSH with free T4 reflex; Future  - Lipid Profile (Chol, Trig, HDL, LDL calc); Future    Morbid obesity (H)  Encourage good lifestyle changes to promote weight loss- increasing physical activity as tolerated and portion control  Patient tells me she has lost significant weight since being diagnosed with DMII.  Historically         25 minutes spent by me on the date of the encounter doing chart review, history and exam, documentation and further activities per the  note      BMI  Estimated body mass index is 44.37 kg/m  as calculated from the following:    Height as of this encounter: 1.524 m (5').    Weight as of this encounter: 103.1 kg (227 lb 3.2 oz).   Weight management plan: Discussed healthy diet and exercise guidelines        No follow-ups on file.    Arelis Hu is a 63 year old, presenting for the following health issues:  Arthritis        5/7/2024     8:21 AM   Additional Questions   Roomed by Joi Haskins LPN, PHILLIP, MHP   Accompanied by self     History of Present Illness       Reason for visit:  Arthritis    She eats 2-3 servings of fruits and vegetables daily.She consumes 0 sweetened beverage(s) daily.She exercises with enough effort to increase her heart rate 20 to 29 minutes per day.  She exercises with enough effort to increase her heart rate 6 days per week.   She is taking medications regularly.     **Patient would like to also discuss mom going into dementia. Patient has power of  of her parent and would like to also discuss her mother during this visit. Mom lives alone in her own apartment but patient feels she is a danger to herself and possibly others.     Anxiety, memory, lives alone.  Still drives.  Still able to pay her bills.  Patient watches mom close  Was on vacation with mom-  Sun downing- she decided   Personality changes.  Hates everyone in the family- mad at patient all the time.    Man with a white mask- gets a thought and she has no idea that this isn't reality.    Came to a crisis point in the last few weeks.  She won't take anxiety medications.  Has not been taking her medications.    Sister in [place] who is not involved  She has a niece who will support her in anything.    My toes hurt, can you drive me to the dentist.    Soaking feet in lemon juice- trying to get rid of fungus.    Burned her cheeks and put on Chap[place] because she thought   Paranoid thoughts, persecutory thoughts.  Unusual behavior.  This has been going on  for a long time.   Wants to be in her apartment-       Diabetes Follow-up    How often are you checking your blood sugar? Not at all  What concerns do you have today about your diabetes? None   Do you have any of these symptoms? (Select all that apply)  No numbness or tingling in feet.  No redness, sores or blisters on feet.  No complaints of excessive thirst.  No reports of blurry vision.  No significant changes to weight.  Have you had a diabetic eye exam in the last 12 months? Yes- Date of last eye exam: 1 month ago,  Location: [facility]     Concern - arthritis - interested in knee injections  Onset: ongoing   Description:   Pain and knee locking   Intensity: moderate  Progression of Symptoms:  intermittent  Accompanying Signs & Symptoms:  Patient limited on mobility- knees feel like they jerk out of place and lock up   Previous history of similar problem:   Yes   Precipitating factors:   Worsened by: movement   Alleviating factors:  Improved by: ibuprofen     Therapies Tried and outcome: ibuprofen- mild relief            BP Readings from Last 2 Encounters:   05/07/24 120/80   07/10/23 108/62     Hemoglobin A1C (%)   Date Value   09/21/2021 5.4   12/24/2019 5.7 (H)     LDL Cholesterol Calculated (mg/dL)   Date Value   09/21/2021 117 (H)   12/24/2019 123 (H)           Review of Systems  Constitutional, neuro, ENT, endocrine, pulmonary, cardiac, gastrointestinal, genitourinary, musculoskeletal, integument and psychiatric systems are negative, except as otherwise noted.      Objective    /80 (BP Location: Right arm, Patient Position: Sitting, Cuff Size: Adult Large)   Pulse 101   Temp 97.8  F (36.6  C) (Tympanic)   Resp 17   Ht 1.524 m (5')   Wt 103.1 kg (227 lb 3.2 oz)   SpO2 99%   BMI 44.37 kg/m    Body mass index is 44.37 kg/m .  Physical Exam   GENERAL: alert and no distress  EYES: Eyes grossly normal to inspection, PERRL and conjunctivae and sclerae normal  HENT: ear canals and TM's normal,  nose and mouth without ulcers or lesions  NECK: no adenopathy, no asymmetry, masses, or scars  RESP: lungs clear to auscultation - no rales, rhonchi or wheezes  CV: regular rate and rhythm, normal S1 S2, no S3 or S4, no murmur, click or rub, no peripheral edema  ABDOMEN: soft, nontender, no hepatosplenomegaly, no masses and bowel sounds normal  MS: no gross musculoskeletal defects noted, no edema  SKIN: no suspicious lesions or rashes  NEURO: Normal strength and tone, mentation intact and speech normal  PSYCH: mentation appears normal, affect normal/bright       Signed Electronically by: Miroslava Solorzano MD

## 2024-05-17 ENCOUNTER — OFFICE VISIT (OUTPATIENT)
Dept: FAMILY MEDICINE | Facility: OTHER | Age: 64
End: 2024-05-17
Attending: STUDENT IN AN ORGANIZED HEALTH CARE EDUCATION/TRAINING PROGRAM
Payer: COMMERCIAL

## 2024-05-17 VITALS
DIASTOLIC BLOOD PRESSURE: 70 MMHG | SYSTOLIC BLOOD PRESSURE: 118 MMHG | HEART RATE: 79 BPM | HEIGHT: 60 IN | OXYGEN SATURATION: 98 % | TEMPERATURE: 97.6 F | RESPIRATION RATE: 18 BRPM | WEIGHT: 222 LBS | BODY MASS INDEX: 43.59 KG/M2

## 2024-05-17 DIAGNOSIS — G89.29 BILATERAL CHRONIC KNEE PAIN: Primary | ICD-10-CM

## 2024-05-17 DIAGNOSIS — M25.561 BILATERAL CHRONIC KNEE PAIN: Primary | ICD-10-CM

## 2024-05-17 DIAGNOSIS — M25.562 BILATERAL CHRONIC KNEE PAIN: Primary | ICD-10-CM

## 2024-05-17 DIAGNOSIS — M17.12 ARTHRITIS OF LEFT KNEE: ICD-10-CM

## 2024-05-17 PROCEDURE — G0463 HOSPITAL OUTPT CLINIC VISIT: HCPCS

## 2024-05-17 PROCEDURE — G0463 HOSPITAL OUTPT CLINIC VISIT: HCPCS | Mod: 25

## 2024-05-17 PROCEDURE — 99213 OFFICE O/P EST LOW 20 MIN: CPT | Mod: 25 | Performed by: STUDENT IN AN ORGANIZED HEALTH CARE EDUCATION/TRAINING PROGRAM

## 2024-05-17 PROCEDURE — 250N000011 HC RX IP 250 OP 636: Performed by: STUDENT IN AN ORGANIZED HEALTH CARE EDUCATION/TRAINING PROGRAM

## 2024-05-17 PROCEDURE — 20610 DRAIN/INJ JOINT/BURSA W/O US: CPT | Mod: LT | Performed by: STUDENT IN AN ORGANIZED HEALTH CARE EDUCATION/TRAINING PROGRAM

## 2024-05-17 RX ORDER — LIDOCAINE HYDROCHLORIDE 20 MG/ML
2 INJECTION, SOLUTION INFILTRATION; PERINEURAL ONCE
Status: DISCONTINUED | OUTPATIENT
Start: 2024-05-17 | End: 2024-05-17 | Stop reason: DRUGHIGH

## 2024-05-17 RX ORDER — TRIAMCINOLONE ACETONIDE 40 MG/ML
40 INJECTION, SUSPENSION INTRA-ARTICULAR; INTRAMUSCULAR ONCE
Status: COMPLETED | OUTPATIENT
Start: 2024-05-17 | End: 2024-05-17

## 2024-05-17 RX ADMIN — TRIAMCINOLONE ACETONIDE 40 MG: 40 INJECTION, SUSPENSION INTRA-ARTICULAR; INTRAMUSCULAR at 11:24

## 2024-05-17 ASSESSMENT — ANXIETY QUESTIONNAIRES
5. BEING SO RESTLESS THAT IT IS HARD TO SIT STILL: NOT AT ALL
2. NOT BEING ABLE TO STOP OR CONTROL WORRYING: NOT AT ALL
GAD7 TOTAL SCORE: 0
GAD7 TOTAL SCORE: 0
1. FEELING NERVOUS, ANXIOUS, OR ON EDGE: NOT AT ALL
6. BECOMING EASILY ANNOYED OR IRRITABLE: NOT AT ALL
4. TROUBLE RELAXING: NOT AT ALL
IF YOU CHECKED OFF ANY PROBLEMS ON THIS QUESTIONNAIRE, HOW DIFFICULT HAVE THESE PROBLEMS MADE IT FOR YOU TO DO YOUR WORK, TAKE CARE OF THINGS AT HOME, OR GET ALONG WITH OTHER PEOPLE: NOT DIFFICULT AT ALL
3. WORRYING TOO MUCH ABOUT DIFFERENT THINGS: NOT AT ALL
7. FEELING AFRAID AS IF SOMETHING AWFUL MIGHT HAPPEN: NOT AT ALL

## 2024-05-17 ASSESSMENT — PAIN SCALES - GENERAL: PAINLEVEL: SEVERE PAIN (6)

## 2024-05-17 ASSESSMENT — PATIENT HEALTH QUESTIONNAIRE - PHQ9: SUM OF ALL RESPONSES TO PHQ QUESTIONS 1-9: 0

## 2024-05-17 NOTE — PROGRESS NOTES
Assessment & Plan     Bilateral chronic knee pain  - Patient with knee arthritis returning to clinic for another knee injection.  She had a right knee injection about a week ago and tolerated it well with good relief of pain.  Hoping that she can sustain this pain relief.      Arthritis of left knee  Patient returns to clinic for left knee injection.  See procedure below  Patient tolerated procedure well  Standard precautions reviewed with patient  - triamcinolone (KENALOG-40) injection 40 mg  - lidocaine 1 % 2 mL    PROCEDURE:  JOINT INJECTION.  After a discussion of risks, benefits and side effects of procedure, informed patient consent was obtained.   The left knee was prepped with 3 betadine swabs. Using 2 cc of 1% lidocaine mixed with 40 mg of Kenalog, the left knee was successfully injected without complication.  Patient did experience some pain relief following injection.   Reviewed lighter activities for the next three days, with subsequent increase in activity. Symptoms that would warrant urgent follow up reviewed.         No follow-ups on file.    Arelis Hu is a 63 year old, presenting for the following health issues:  Knee Pain        5/17/2024    10:43 AM   Additional Questions   Roomed by April   Accompanied by self         5/17/2024    10:43 AM   Patient Reported Additional Medications   Patient reports taking the following new medications none     History of Present Illness       Reason for visit:  Arthritis    She eats 2-3 servings of fruits and vegetables daily.She consumes 0 sweetened beverage(s) daily.She exercises with enough effort to increase her heart rate 20 to 29 minutes per day.  She exercises with enough effort to increase her heart rate 6 days per week.   She is taking medications regularly.       Pain History:  When did you first notice your pain? Years    Have you seen this provider for your pain in the past? Yes   Where in your body do you have pain? knees  Are you seeing  anyone else for your pain? No          9/21/2021     2:33 PM 8/9/2022     2:00 PM 5/17/2024    10:44 AM   PHQ-9 SCORE   PHQ-9 Total Score 0 1 0           8/9/2022     2:00 PM 5/17/2024    10:45 AM   EDA-7 SCORE   Total Score 1 0           5/17/2024    10:45 AM   PEG Score   PEG Total Score 6.67       Chronic Pain Follow Up:    Location of pain: both knees but now right knee is improved after injection  Analgesia/pain control:    - Recent changes:  progressively getting worse    - Overall control: Tolerable with discomfort    - Current treatments: OTC pain medication   Adherence:     - Do you ever take more pain medicine than prescribed? No    - When did you take your last dose of pain medicine?  Few days ago   Adverse effects: No   PDMP Review       None          Last CSA Agreement:   CSA -- Patient Level:    CSA: None found at the patient level.           Review of Systems  Constitutional, HEENT, cardiovascular, pulmonary, gi and gu systems are negative, except as otherwise noted.      Objective    /70 (BP Location: Left arm, Patient Position: Sitting, Cuff Size: Adult Regular)   Pulse 79   Temp 97.6  F (36.4  C) (Tympanic)   Resp 18   Ht 1.524 m (5')   Wt 100.7 kg (222 lb)   SpO2 98%   BMI 43.36 kg/m    Body mass index is 43.36 kg/m .  Physical Exam   GENERAL: alert and no distress  EYES: Eyes grossly normal to inspection, PERRL and conjunctivae and sclerae normal  RESP: lungs clear to auscultation - no rales, rhonchi or wheezes  CV: regular rate and rhythm, normal S1 S2, no S3 or S4, no murmur, click or rub, no peripheral edema  MS: no gross musculoskeletal defects noted, no edema  SKIN: no suspicious lesions or rashes  NEURO: Normal strength and tone, mentation intact and speech normal  PSYCH: mentation appears normal, affect normal/bright          Signed Electronically by: Miroslava Solorzano MD

## 2024-06-21 ENCOUNTER — OFFICE VISIT (OUTPATIENT)
Dept: FAMILY MEDICINE | Facility: OTHER | Age: 64
End: 2024-06-21
Attending: STUDENT IN AN ORGANIZED HEALTH CARE EDUCATION/TRAINING PROGRAM
Payer: COMMERCIAL

## 2024-06-21 ENCOUNTER — ORDERS ONLY (AUTO-RELEASED) (OUTPATIENT)
Dept: FAMILY MEDICINE | Facility: OTHER | Age: 64
End: 2024-06-21

## 2024-06-21 VITALS
HEART RATE: 96 BPM | RESPIRATION RATE: 20 BRPM | HEIGHT: 61 IN | BODY MASS INDEX: 41.42 KG/M2 | DIASTOLIC BLOOD PRESSURE: 82 MMHG | TEMPERATURE: 97.1 F | OXYGEN SATURATION: 98 % | WEIGHT: 219.4 LBS | SYSTOLIC BLOOD PRESSURE: 120 MMHG

## 2024-06-21 DIAGNOSIS — Z12.11 SCREENING FOR COLON CANCER: ICD-10-CM

## 2024-06-21 DIAGNOSIS — R55 SYNCOPE, UNSPECIFIED SYNCOPE TYPE: ICD-10-CM

## 2024-06-21 DIAGNOSIS — N93.9 VAGINAL SPOTTING: ICD-10-CM

## 2024-06-21 DIAGNOSIS — Z78.0 ASYMPTOMATIC MENOPAUSAL STATE: ICD-10-CM

## 2024-06-21 DIAGNOSIS — E11.9 TYPE 2 DIABETES MELLITUS WITHOUT COMPLICATION, WITHOUT LONG-TERM CURRENT USE OF INSULIN (H): ICD-10-CM

## 2024-06-21 DIAGNOSIS — Z12.31 ENCOUNTER FOR SCREENING MAMMOGRAM FOR BREAST CANCER: ICD-10-CM

## 2024-06-21 DIAGNOSIS — N89.8 VAGINAL DISCHARGE: ICD-10-CM

## 2024-06-21 DIAGNOSIS — Z00.00 ROUTINE HISTORY AND PHYSICAL EXAMINATION OF ADULT: Primary | ICD-10-CM

## 2024-06-21 DIAGNOSIS — L30.4 INTERTRIGO: ICD-10-CM

## 2024-06-21 LAB
ALBUMIN SERPL BCG-MCNC: 4.1 G/DL (ref 3.5–5.2)
ALBUMIN UR-MCNC: NEGATIVE MG/DL
ALP SERPL-CCNC: 73 U/L (ref 40–150)
ALT SERPL W P-5'-P-CCNC: 16 U/L (ref 0–50)
ANION GAP SERPL CALCULATED.3IONS-SCNC: 13 MMOL/L (ref 7–15)
APPEARANCE UR: CLEAR
AST SERPL W P-5'-P-CCNC: 24 U/L (ref 0–45)
BACTERIAL VAGINOSIS VAG-IMP: NEGATIVE
BASOPHILS # BLD AUTO: 0 10E3/UL (ref 0–0.2)
BASOPHILS NFR BLD AUTO: 1 %
BILIRUB SERPL-MCNC: 0.6 MG/DL
BILIRUB UR QL STRIP: NEGATIVE
BUN SERPL-MCNC: 20.9 MG/DL (ref 8–23)
CALCIUM SERPL-MCNC: 9.4 MG/DL (ref 8.8–10.2)
CANDIDA DNA VAG QL NAA+PROBE: NOT DETECTED
CANDIDA GLABRATA / CANDIDA KRUSEI DNA: NOT DETECTED
CHLORIDE SERPL-SCNC: 104 MMOL/L (ref 98–107)
CHOLEST SERPL-MCNC: 265 MG/DL
COLOR UR AUTO: ABNORMAL
CREAT SERPL-MCNC: 0.77 MG/DL (ref 0.51–0.95)
DEPRECATED HCO3 PLAS-SCNC: 24 MMOL/L (ref 22–29)
EGFRCR SERPLBLD CKD-EPI 2021: 85 ML/MIN/1.73M2
EOSINOPHIL # BLD AUTO: 0.1 10E3/UL (ref 0–0.7)
EOSINOPHIL NFR BLD AUTO: 1 %
ERYTHROCYTE [DISTWIDTH] IN BLOOD BY AUTOMATED COUNT: 14.4 % (ref 10–15)
EST. AVERAGE GLUCOSE BLD GHB EST-MCNC: 108 MG/DL
FASTING STATUS PATIENT QL REPORTED: YES
FASTING STATUS PATIENT QL REPORTED: YES
GLUCOSE SERPL-MCNC: 99 MG/DL (ref 70–99)
GLUCOSE UR STRIP-MCNC: NEGATIVE MG/DL
HBA1C MFR BLD: 5.4 %
HCT VFR BLD AUTO: 46.4 % (ref 35–47)
HCV AB SERPL QL IA: NONREACTIVE
HDLC SERPL-MCNC: 79 MG/DL
HGB BLD-MCNC: 15.3 G/DL (ref 11.7–15.7)
HGB UR QL STRIP: NEGATIVE
HIV 1+2 AB+HIV1 P24 AG SERPL QL IA: NONREACTIVE
IMM GRANULOCYTES # BLD: 0 10E3/UL
IMM GRANULOCYTES NFR BLD: 1 %
KETONES UR STRIP-MCNC: ABNORMAL MG/DL
LDLC SERPL CALC-MCNC: 177 MG/DL
LEUKOCYTE ESTERASE UR QL STRIP: ABNORMAL
LYMPHOCYTES # BLD AUTO: 2.3 10E3/UL (ref 0.8–5.3)
LYMPHOCYTES NFR BLD AUTO: 34 %
MCH RBC QN AUTO: 29.6 PG (ref 26.5–33)
MCHC RBC AUTO-ENTMCNC: 33 G/DL (ref 31.5–36.5)
MCV RBC AUTO: 90 FL (ref 78–100)
MONOCYTES # BLD AUTO: 0.5 10E3/UL (ref 0–1.3)
MONOCYTES NFR BLD AUTO: 8 %
MUCOUS THREADS #/AREA URNS LPF: PRESENT /LPF
NEUTROPHILS # BLD AUTO: 3.8 10E3/UL (ref 1.6–8.3)
NEUTROPHILS NFR BLD AUTO: 56 %
NITRATE UR QL: NEGATIVE
NONHDLC SERPL-MCNC: 186 MG/DL
NRBC # BLD AUTO: 0 10E3/UL
NRBC BLD AUTO-RTO: 0 /100
PH UR STRIP: 5.5 [PH] (ref 4.7–8)
PLATELET # BLD AUTO: 254 10E3/UL (ref 150–450)
POTASSIUM SERPL-SCNC: 4.1 MMOL/L (ref 3.4–5.3)
PROT SERPL-MCNC: 7 G/DL (ref 6.4–8.3)
RBC # BLD AUTO: 5.17 10E6/UL (ref 3.8–5.2)
RBC URINE: 1 /HPF
SODIUM SERPL-SCNC: 141 MMOL/L (ref 135–145)
SP GR UR STRIP: 1.02 (ref 1–1.03)
SQUAMOUS EPITHELIAL: 1 /HPF
T VAGINALIS DNA VAG QL NAA+PROBE: NOT DETECTED
TRIGL SERPL-MCNC: 46 MG/DL
TSH SERPL DL<=0.005 MIU/L-ACNC: 2.33 UIU/ML (ref 0.3–4.2)
UROBILINOGEN UR STRIP-MCNC: NORMAL MG/DL
VIT D+METAB SERPL-MCNC: 29 NG/ML (ref 20–50)
WBC # BLD AUTO: 6.7 10E3/UL (ref 4–11)
WBC URINE: 3 /HPF

## 2024-06-21 PROCEDURE — 88142 CYTOPATH C/V THIN LAYER: CPT | Mod: ZL | Performed by: STUDENT IN AN ORGANIZED HEALTH CARE EDUCATION/TRAINING PROGRAM

## 2024-06-21 PROCEDURE — 99213 OFFICE O/P EST LOW 20 MIN: CPT | Mod: 25 | Performed by: STUDENT IN AN ORGANIZED HEALTH CARE EDUCATION/TRAINING PROGRAM

## 2024-06-21 PROCEDURE — 87389 HIV-1 AG W/HIV-1&-2 AB AG IA: CPT | Mod: ZL | Performed by: STUDENT IN AN ORGANIZED HEALTH CARE EDUCATION/TRAINING PROGRAM

## 2024-06-21 PROCEDURE — 82306 VITAMIN D 25 HYDROXY: CPT | Mod: ZL | Performed by: STUDENT IN AN ORGANIZED HEALTH CARE EDUCATION/TRAINING PROGRAM

## 2024-06-21 PROCEDURE — 86803 HEPATITIS C AB TEST: CPT | Mod: ZL | Performed by: STUDENT IN AN ORGANIZED HEALTH CARE EDUCATION/TRAINING PROGRAM

## 2024-06-21 PROCEDURE — G0438 PPPS, INITIAL VISIT: HCPCS | Performed by: STUDENT IN AN ORGANIZED HEALTH CARE EDUCATION/TRAINING PROGRAM

## 2024-06-21 PROCEDURE — G0463 HOSPITAL OUTPT CLINIC VISIT: HCPCS

## 2024-06-21 PROCEDURE — 81003 URINALYSIS AUTO W/O SCOPE: CPT | Mod: ZL | Performed by: STUDENT IN AN ORGANIZED HEALTH CARE EDUCATION/TRAINING PROGRAM

## 2024-06-21 PROCEDURE — 84443 ASSAY THYROID STIM HORMONE: CPT | Mod: ZL | Performed by: STUDENT IN AN ORGANIZED HEALTH CARE EDUCATION/TRAINING PROGRAM

## 2024-06-21 PROCEDURE — 82465 ASSAY BLD/SERUM CHOLESTEROL: CPT | Mod: ZL | Performed by: STUDENT IN AN ORGANIZED HEALTH CARE EDUCATION/TRAINING PROGRAM

## 2024-06-21 PROCEDURE — 87624 HPV HI-RISK TYP POOLED RSLT: CPT | Mod: ZL | Performed by: STUDENT IN AN ORGANIZED HEALTH CARE EDUCATION/TRAINING PROGRAM

## 2024-06-21 PROCEDURE — 85041 AUTOMATED RBC COUNT: CPT | Mod: ZL | Performed by: STUDENT IN AN ORGANIZED HEALTH CARE EDUCATION/TRAINING PROGRAM

## 2024-06-21 PROCEDURE — 83718 ASSAY OF LIPOPROTEIN: CPT | Mod: ZL | Performed by: STUDENT IN AN ORGANIZED HEALTH CARE EDUCATION/TRAINING PROGRAM

## 2024-06-21 PROCEDURE — 83036 HEMOGLOBIN GLYCOSYLATED A1C: CPT | Mod: ZL | Performed by: STUDENT IN AN ORGANIZED HEALTH CARE EDUCATION/TRAINING PROGRAM

## 2024-06-21 PROCEDURE — 84155 ASSAY OF PROTEIN SERUM: CPT | Mod: ZL | Performed by: STUDENT IN AN ORGANIZED HEALTH CARE EDUCATION/TRAINING PROGRAM

## 2024-06-21 PROCEDURE — 36415 COLL VENOUS BLD VENIPUNCTURE: CPT | Mod: ZL | Performed by: STUDENT IN AN ORGANIZED HEALTH CARE EDUCATION/TRAINING PROGRAM

## 2024-06-21 PROCEDURE — 0352U MULTIPLEX VAGINAL PANEL BY PCR: CPT | Mod: ZL | Performed by: STUDENT IN AN ORGANIZED HEALTH CARE EDUCATION/TRAINING PROGRAM

## 2024-06-21 PROCEDURE — 87086 URINE CULTURE/COLONY COUNT: CPT | Mod: ZL,GZ | Performed by: STUDENT IN AN ORGANIZED HEALTH CARE EDUCATION/TRAINING PROGRAM

## 2024-06-21 SDOH — HEALTH STABILITY: PHYSICAL HEALTH: ON AVERAGE, HOW MANY MINUTES DO YOU ENGAGE IN EXERCISE AT THIS LEVEL?: 0 MIN

## 2024-06-21 SDOH — HEALTH STABILITY: PHYSICAL HEALTH: ON AVERAGE, HOW MANY DAYS PER WEEK DO YOU ENGAGE IN MODERATE TO STRENUOUS EXERCISE (LIKE A BRISK WALK)?: 0 DAYS

## 2024-06-21 ASSESSMENT — ANXIETY QUESTIONNAIRES
GAD7 TOTAL SCORE: 0
GAD7 TOTAL SCORE: 0
6. BECOMING EASILY ANNOYED OR IRRITABLE: NOT AT ALL
2. NOT BEING ABLE TO STOP OR CONTROL WORRYING: NOT AT ALL
3. WORRYING TOO MUCH ABOUT DIFFERENT THINGS: NOT AT ALL
GAD7 TOTAL SCORE: 0
7. FEELING AFRAID AS IF SOMETHING AWFUL MIGHT HAPPEN: NOT AT ALL
8. IF YOU CHECKED OFF ANY PROBLEMS, HOW DIFFICULT HAVE THESE MADE IT FOR YOU TO DO YOUR WORK, TAKE CARE OF THINGS AT HOME, OR GET ALONG WITH OTHER PEOPLE?: NOT DIFFICULT AT ALL
1. FEELING NERVOUS, ANXIOUS, OR ON EDGE: NOT AT ALL
4. TROUBLE RELAXING: NOT AT ALL
5. BEING SO RESTLESS THAT IT IS HARD TO SIT STILL: NOT AT ALL
IF YOU CHECKED OFF ANY PROBLEMS ON THIS QUESTIONNAIRE, HOW DIFFICULT HAVE THESE PROBLEMS MADE IT FOR YOU TO DO YOUR WORK, TAKE CARE OF THINGS AT HOME, OR GET ALONG WITH OTHER PEOPLE: NOT DIFFICULT AT ALL
7. FEELING AFRAID AS IF SOMETHING AWFUL MIGHT HAPPEN: NOT AT ALL

## 2024-06-21 ASSESSMENT — PATIENT HEALTH QUESTIONNAIRE - PHQ9
SUM OF ALL RESPONSES TO PHQ QUESTIONS 1-9: 1
10. IF YOU CHECKED OFF ANY PROBLEMS, HOW DIFFICULT HAVE THESE PROBLEMS MADE IT FOR YOU TO DO YOUR WORK, TAKE CARE OF THINGS AT HOME, OR GET ALONG WITH OTHER PEOPLE: NOT DIFFICULT AT ALL
SUM OF ALL RESPONSES TO PHQ QUESTIONS 1-9: 1

## 2024-06-21 ASSESSMENT — PAIN SCALES - GENERAL: PAINLEVEL: NO PAIN (0)

## 2024-06-21 ASSESSMENT — SOCIAL DETERMINANTS OF HEALTH (SDOH): HOW OFTEN DO YOU GET TOGETHER WITH FRIENDS OR RELATIVES?: MORE THAN THREE TIMES A WEEK

## 2024-06-21 NOTE — PROGRESS NOTES
Preventive Care Visit  [facility]  Miroslava Solorzano MD, Family Medicine  Jun 21, 2024      Assessment & Plan     Routine history and physical examination of adult  Patient presents for her annual physical.  She is due for fasting labs, those have been ordered.  - Vitamin D Deficiency; Future  - HIV Antigen Antibody Combo; Future  - Hepatitis C Screen Reflex to HCV RNA Quant and Genotype; Future  - Hepatitis C Screen Reflex to HCV RNA Quant and Genotype  - HIV Antigen Antibody Combo  - Vitamin D Deficiency        Vaginal spotting  Intermittent spotting.  Needs work-up for this.  US ordered.  - US Pelvic Complete with Transvaginal; Future  - US Pelvic Complete with Transvaginal     Asymptomatic menopausal state  Due for DEXA  - DX Bone Density; Future     Encounter for screening mammogram for breast cancer  Due  - MA Screen Bilateral w/Addi; Future     Syncope, unspecified syncope type  Most likely vasovagal syncope in the bathroom.    - Echocardiogram Complete; Future  - UA Macroscopic with reflex to Microscopic and Culture; Future  - UA Macroscopic with reflex to Microscopic and Culture     Body mass index (BMI) 40.0-44.9, adult (H)  - Vitamin D Deficiency; Future  - Vitamin D Deficiency     Vaginal discharge     - Gynecologic Cytology (Pap) and HPV (Ages 30-65); Future  - Multiplex Vaginal Panel by PCR; Future  - Gynecologic Cytology (Pap) and HPV (Ages 30-65)  - Multiplex Vaginal Panel by PCR     Screening for colon cancer  - COLOGUARD([employer]); Future     Type 2 diabetes mellitus without complication, without long-term current use of insulin (H)  - Lipid Profile (Chol, Trig, HDL, LDL calc)  - TSH with free T4 reflex  - Comprehensive metabolic panel  - CBC with platelets and differential  - Hemoglobin A1c     Intertrigo  Discussed management with keeping dry.  Will try to get some silver sheets for patient        Counseling  Appropriate preventive services were discussed with this patient,  including applicable screening as appropriate for fall prevention, nutrition, physical activity, Tobacco-use cessation, weight loss and cognition.  Checklist reviewing preventive services available has been given to the patient.  Reviewed patient's diet, addressing concerns and/or questions.   The patient was instructed to see the dentist every 6 months.   Discussed possible causes of fatigue. Information on urinary incontinence and treatment options given to patient.       No follow-ups on file.    Arelis Hu is a 63 year old, presenting for the following:  Physical and Diabetes        6/21/2024     7:58 AM   Additional Questions   Roomed by Mike Duque   Accompanied by Self         6/21/2024     7:58 AM   Patient Reported Additional Medications   Patient reports taking the following new medications none        Health Care Directive  Patient does not have a Health Care Directive or Living Will: Discussed advance care planning with patient; information given to patient to review.    HPI    Left breast pain.  Mastectomy done in 2011 on the right breast, benign.  Subsequently had infection of the right breast after her surgery.  First had lumpectomy which then got infected which resulted in a mastectomy.   No bleeding and no drainage from the left breast.  Pain comes and goes.   Sometimes she things she is bleeding.  Bleeding   Showers daily but notices foul discharge.  Spotting at times.        Gets dizzy a lot.  Can be sitting in the chair and gets dizzy suddenly.  Sometimes when walks, gets dizzy feeling and needs to grab onto something.  Does not last more than a minute, very quick.  Does not get nauseous.    2 days before belinda, 3 am- goes to bathroom.  Got so dizzy she could not move.  Fainted and woke up cold, reached a blanket and put it   Hit her head hard.          Diabetes Follow-up    How often are you checking your blood sugar? Not at all  What concerns do you have today about your diabetes?  Other: A1C went down and was informed she is no longer diabetic    Do you have any of these symptoms? (Select all that apply)  No numbness or tingling in feet.  No redness, sores or blisters on feet.  No complaints of excessive thirst.  No reports of blurry vision.  No significant changes to weight.      BP Readings from Last 2 Encounters:   06/21/24 120/82   05/17/24 118/70     Hemoglobin A1C (%)   Date Value   09/21/2021 5.4   12/24/2019 5.7 (H)     LDL Cholesterol Calculated (mg/dL)   Date Value   09/21/2021 117 (H)   12/24/2019 123 (H)           6/21/2024   General Health   How would you rate your overall physical health? Good   Feel stress (tense, anxious, or unable to sleep) Not at all            6/21/2024   Nutrition   Diet: Other   If other, please elaborate: keto            6/21/2024   Exercise   Days per week of moderate/strenous exercise 0 days   Average minutes spent exercising at this level 0 min      (!) EXERCISE CONCERN      6/21/2024   Social Factors   Frequency of gathering with friends or relatives More than three times a week   Worry food won't last until get money to buy more No   Food not last or not have enough money for food? No   Do you have housing? (Housing is defined as stable permanent housing and does not include staying ouside in a car, in a tent, in an abandoned building, in an overnight shelter, or couch-surfing.) Yes   Are you worried about losing your housing? No   Lack of transportation? No   Unable to get utilities (heat,electricity)? No            6/21/2024   Fall Risk   Fallen 2 or more times in the past year? No   Trouble with walking or balance? No             6/21/2024   Activities of Daily Living- Home Safety   Needs help with the following daily activites None of the above   Safety concerns in the home None of the above            6/21/2024   Dental   Dentist two times every year? (!) NO            6/21/2024   Hearing Screening   Hearing concerns? None of the above             6/21/2024   Driving Risk Screening   Patient/family members have concerns about driving No            6/21/2024   General Alertness/Fatigue Screening   Have you been more tired than usual lately? (!) YES            6/21/2024   Urinary Incontinence Screening   Bothered by leaking urine in past 6 months Yes            6/21/2024   TB Screening   Were you born outside of the [place]? No          Today's PHQ-9 Score:       6/21/2024     7:46 AM   PHQ-9 SCORE   PHQ-9 Total Score MyChart 1 (Minimal depression)   PHQ-9 Total Score 1         6/21/2024   Substance Use   Alcohol more than 3/day or more than 7/wk Not Applicable   Do you have a current opioid prescription? No   How severe/bad is pain from 1 to 10? 0/10 (No Pain)   Do you use any other substances recreationally? No        Social History     Tobacco Use    Smoking status: Never     Passive exposure: Past (As a child)    Smokeless tobacco: Never   Vaping Use    Vaping status: Never Used   Substance Use Topics    Alcohol use: Never    Drug use: Never          Mammogram Screening - Mammogram every 1-2 years updated in Health Maintenance based on mutual decision making          6/21/2024   One time HIV Screening   Previous HIV test? No        History of abnormal Pap smear: No - age 30-64 HPV with reflex Pap every 5 years recommended       ASCVD Risk   The 10-year ASCVD risk score ([place] BALDERRAMA, et al., 2019) is: 8.1%    Values used to calculate the score:      Age: 63 years      Sex: Female      Is Non- : No      Diabetic: Yes      Tobacco smoker: No      Systolic Blood Pressure: 120 mmHg      Is BP treated: No      HDL Cholesterol: 83 mg/dL      Total Cholesterol: 214 mg/dL          Reviewed and updated as needed this visit by Provider                    Past Medical History:   Diagnosis Date    Diabetes (H)     Idiopathic chronic gout of foot without tophus, unspecified laterality 12/24/2019    Morbid obesity (H) 12/24/2019    Type 2  diabetes mellitus without complication, without long-term current use of insulin (H) 2019     Past Surgical History:   Procedure Laterality Date    BREAST BIOPSY, CORE RT/LT Right 2011    DILATION AND CURETTAGE      missed ab    DILATION AND CURETTAGE      missed ab    MAMMOGRAM - HIM SCAN      MASTECTOMY Right     due to infected biospy site. No cancer    PAP DIAGNOSTIC SMEAR       OB History    Para Term  AB Living   3 1 1 0 2 0   SAB IAB Ectopic Multiple Live Births   2 0 0 0 1      # Outcome Date GA Lbr Danie/2nd Weight Sex Type Anes PTL Lv   3 SAB            2 SAB            1 Term              Current providers sharing in care for this patient include:  Patient Care Team:  Miroslava Solorzano MD as PCP - General (Family Medicine)  Miroslava Solorzano MD as Assigned PCP    The following health maintenance items are reviewed in Epic and correct as of today:  Health Maintenance   Topic Date Due    DEXA  Never done    MAMMO SCREENING  Never done    COLORECTAL CANCER SCREENING  Never done    HIV SCREENING  Never done    HEPATITIS C SCREENING  Never done    RSV VACCINE (Pregnancy & 60+) (1 - 1-dose 60+ series) Never done    A1C  2021    LIPID  2022    DIABETIC FOOT EXAM  2022    COVID-19 Vaccine ( season) Never done    MEDICARE ANNUAL WELLNESS VISIT  Never done    MICROALBUMIN  2023    BMP  07/10/2024    INFLUENZA VACCINE (Season Ended) 2024    ADVANCE CARE PLANNING  2024    EYE EXAM  2025    FALL RISK ASSESSMENT  2025    DTAP/TDAP/TD IMMUNIZATION (3 - Td or Tdap) 2031    PHQ-2 (once per calendar year)  Completed    Pneumococcal Vaccine: 65+ Years  Completed    ZOSTER IMMUNIZATION  Addressed    IPV IMMUNIZATION  Aged Out    HPV IMMUNIZATION  Aged Out    MENINGITIS IMMUNIZATION  Aged Out    RSV MONOCLONAL ANTIBODY  Aged Out         Review of Systems  Constitutional, HEENT, cardiovascular, pulmonary, GI, ,  "musculoskeletal, neuro, skin, endocrine and psych systems are negative, except as otherwise noted.     Objective    Exam  /82 (BP Location: Right arm, Patient Position: Sitting, Cuff Size: Adult Large)   Pulse 96   Temp 97.1  F (36.2  C) (Tympanic)   Resp 20   Ht 1.561 m (5' 1.46\")   Wt 99.5 kg (219 lb 6.4 oz)   SpO2 98%   BMI 40.84 kg/m     Estimated body mass index is 40.84 kg/m  as calculated from the following:    Height as of this encounter: 1.561 m (5' 1.46\").    Weight as of this encounter: 99.5 kg (219 lb 6.4 oz).    Physical Exam  GENERAL: alert and no distress  EYES: Eyes grossly normal to inspection, PERRL and conjunctivae and sclerae normal  HENT: ear canals and TM's normal, nose and mouth without ulcers or lesions  NECK: no adenopathy, no asymmetry, masses, or scars  RESP: lungs clear to auscultation - no rales, rhonchi or wheezes  BREAST: normal without masses, tenderness or nipple discharge and no palpable axillary masses or adenopathy  CV: regular rate and rhythm, normal S1 S2, no S3 or S4, no murmur, click or rub, no peripheral edema  ABDOMEN: soft, nontender, no hepatosplenomegaly, no masses and bowel sounds normal   (female) w/bimanual: normal female external genitalia, normal urethral meatus, normal vaginal mucosa, and normal cervix/adnexa/uterus without masses or discharge  MS: no gross musculoskeletal defects noted, no edema  SKIN: no suspicious lesions or rashes  NEURO: Normal strength and tone, mentation intact and speech normal  PSYCH: mentation appears normal, affect normal/bright         6/21/2024   Mini Cog   Clock Draw Score 2 Normal   3 Item Recall 3 objects recalled   Mini Cog Total Score 5          Vision Screen  Reason Vision Screen Not Completed: Patient had exam in last 12 months      Signed Electronically by: Miroslava Solorzano MD    Answers submitted by the patient for this visit:  Patient Health Questionnaire (Submitted on 6/21/2024)  If you checked off any " problems, how difficult have these problems made it for you to do your work, take care of things at home, or get along with other people?: Not difficult at all  PHQ9 TOTAL SCORE: 1  EDA-7 (Submitted on 6/21/2024)  EDA 7 TOTAL SCORE: 0

## 2024-06-21 NOTE — PROGRESS NOTES
Assessment & Plan     Routine history and physical examination of adult  Patient presents for her annual physical.  She is due for fasting labs, those have been ordered.  - Vitamin D Deficiency; Future  - HIV Antigen Antibody Combo; Future  - Hepatitis C Screen Reflex to HCV RNA Quant and Genotype; Future  - Hepatitis C Screen Reflex to HCV RNA Quant and Genotype  - HIV Antigen Antibody Combo  - Vitamin D Deficiency      Vaginal spotting  Intermittent spotting.  Needs work-up for this.  US ordered.  - US Pelvic Complete with Transvaginal; Future  - US Pelvic Complete with Transvaginal    Asymptomatic menopausal state  Due for DEXA  - DX Bone Density; Future    Encounter for screening mammogram for breast cancer  Due  - MA Screen Bilateral w/Addi; Future    Syncope, unspecified syncope type  Most likely vasovagal syncope in the bathroom.    - Echocardiogram Complete; Future  - UA Macroscopic with reflex to Microscopic and Culture; Future  - UA Macroscopic with reflex to Microscopic and Culture    Body mass index (BMI) 40.0-44.9, adult (H)  - Vitamin D Deficiency; Future  - Vitamin D Deficiency    Vaginal discharge    - Gynecologic Cytology (Pap) and HPV (Ages 30-65); Future  - Multiplex Vaginal Panel by PCR; Future  - Gynecologic Cytology (Pap) and HPV (Ages 30-65)  - Multiplex Vaginal Panel by PCR    Screening for colon cancer  - COLOGUARD([employer]); Future    Type 2 diabetes mellitus without complication, without long-term current use of insulin (H)  - Lipid Profile (Chol, Trig, HDL, LDL calc)  - TSH with free T4 reflex  - Comprehensive metabolic panel  - CBC with platelets and differential  - Hemoglobin A1c    Intertrigo  Discussed management with keeping dry.  Will try to get some silver sheets for patient        Counseling  Appropriate preventive services were discussed with this patient, including applicable screening as appropriate for fall prevention, nutrition, physical activity, Tobacco-use  "cessation, weight loss and cognition.  Checklist reviewing preventive services available has been given to the patient.  Reviewed patient's diet, addressing concerns and/or questions.   The patient was instructed to see the dentist every 6 months.   Discussed possible causes of fatigue. Information on urinary incontinence and treatment options given to patient.         No follow-ups on file.    Subjective   Mayte is a 63 year old, presenting for the following health issues:  Physical and Diabetes      6/21/2024     7:58 AM   Additional Questions   Roomed by Mike Duque   Accompanied by Self         6/21/2024     7:58 AM   Patient Reported Additional Medications   Patient reports taking the following new medications none     HPI     Mayte is here for her physical      Review of Systems  Constitutional, HEENT, cardiovascular, pulmonary, GI, , musculoskeletal, neuro, skin, endocrine and psych systems are negative, except as otherwise noted.      Objective    /82 (BP Location: Right arm, Patient Position: Sitting, Cuff Size: Adult Large)   Pulse 96   Temp 97.1  F (36.2  C) (Tympanic)   Resp 20   Ht 1.561 m (5' 1.46\")   Wt 99.5 kg (219 lb 6.4 oz)   SpO2 98%   BMI 40.84 kg/m    Body mass index is 40.84 kg/m .  Physical Exam   GENERAL: alert and no distress  EYES: Eyes grossly normal to inspection, PERRL and conjunctivae and sclerae normal  HENT: ear canals and TM's normal, nose and mouth without ulcers or lesions  NECK: no adenopathy, no asymmetry, masses, or scars  RESP: lungs clear to auscultation - no rales, rhonchi or wheezes  CV: regular rate and rhythm, normal S1 S2, no S3 or S4, no murmur, click or rub, no peripheral edema  ABDOMEN: soft, nontender, no hepatosplenomegaly, no masses and bowel sounds normal   (female): normal female external genitalia, normal urethral meatus, normal vaginal mucosa   (female) w/bimanual: normal female external genitalia, normal urethral meatus, normal vaginal " mucosa, and normal cervix/adnexa/uterus without masses or discharge  MS: no gross musculoskeletal defects noted, no edema  SKIN: no suspicious lesions or rashes  NEURO: Normal strength and tone, mentation intact and speech normal  PSYCH: mentation appears normal, affect normal/bright  Breast: Deferred          Signed Electronically by: Miroslava Solorzano MD    Answers submitted by the patient for this visit:  Patient Health Questionnaire (Submitted on 6/21/2024)  If you checked off any problems, how difficult have these problems made it for you to do your work, take care of things at home, or get along with other people?: Not difficult at all  PHQ9 TOTAL SCORE: 1  EDA-7 (Submitted on 6/21/2024)  EDA 7 TOTAL SCORE: 0

## 2024-06-22 LAB — BACTERIA UR CULT: NORMAL

## 2024-06-26 LAB
HPV HR 12 DNA CVX QL NAA+PROBE: NEGATIVE
HPV16 DNA CVX QL NAA+PROBE: NEGATIVE
HPV18 DNA CVX QL NAA+PROBE: NEGATIVE
HUMAN PAPILLOMA VIRUS FINAL DIAGNOSIS: NORMAL

## 2024-06-28 ENCOUNTER — HOSPITAL ENCOUNTER (OUTPATIENT)
Dept: ULTRASOUND IMAGING | Facility: HOSPITAL | Age: 64
Discharge: HOME | End: 2024-06-28
Attending: STUDENT IN AN ORGANIZED HEALTH CARE EDUCATION/TRAINING PROGRAM
Payer: COMMERCIAL

## 2024-06-28 ENCOUNTER — HOSPITAL ENCOUNTER (OUTPATIENT)
Dept: CARDIOLOGY | Facility: HOSPITAL | Age: 64
Discharge: HOME | End: 2024-06-28
Attending: STUDENT IN AN ORGANIZED HEALTH CARE EDUCATION/TRAINING PROGRAM
Payer: COMMERCIAL

## 2024-06-28 ENCOUNTER — TELEPHONE (OUTPATIENT)
Dept: FAMILY MEDICINE | Facility: OTHER | Age: 64
End: 2024-06-28

## 2024-06-28 DIAGNOSIS — R55 SYNCOPE, UNSPECIFIED SYNCOPE TYPE: ICD-10-CM

## 2024-06-28 LAB — LVEF ECHO: NORMAL

## 2024-06-28 PROCEDURE — 76830 TRANSVAGINAL US NON-OB: CPT

## 2024-06-28 PROCEDURE — 76856 US EXAM PELVIC COMPLETE: CPT

## 2024-06-28 PROCEDURE — 93306 TTE W/DOPPLER COMPLETE: CPT | Mod: 26 | Performed by: INTERNAL MEDICINE

## 2024-06-28 PROCEDURE — 93306 TTE W/DOPPLER COMPLETE: CPT

## 2024-06-28 NOTE — RESULT ENCOUNTER NOTE
Pelvic US looks normal.  I would still recommend follow-up with Ob gyn for this.  Please pend referral to Dr. Rogel for me.  Thank you.

## 2024-06-28 NOTE — TELEPHONE ENCOUNTER
Results requested: Had a missed call yesterday and she is returning that phone call.      Best number to reach patient at: [phone]     Best time to call patient: Anytime     Send to care team in basket.

## 2024-07-02 LAB
BKR LAB AP GYN ADEQUACY: NORMAL
BKR LAB AP GYN INTERPRETATION: NORMAL
BKR LAB AP PREVIOUS ABNORMAL: NORMAL
NONINV COLON CA DNA+OCC BLD SCRN STL QL: NORMAL
PATH REPORT.COMMENTS IMP SPEC: NORMAL
PATH REPORT.COMMENTS IMP SPEC: NORMAL
PATH REPORT.RELEVANT HX SPEC: NORMAL

## 2024-07-09 NOTE — RESULT ENCOUNTER NOTE
I don't think she did anything wrong.  I've seen this happen before.  It's hard to say exactly what happened.

## 2024-07-30 ENCOUNTER — OFFICE VISIT (OUTPATIENT)
Dept: OBGYN | Facility: OTHER | Age: 64
End: 2024-07-30
Attending: OBSTETRICS & GYNECOLOGY
Payer: COMMERCIAL

## 2024-07-30 VITALS
BODY MASS INDEX: 44.21 KG/M2 | SYSTOLIC BLOOD PRESSURE: 126 MMHG | OXYGEN SATURATION: 98 % | WEIGHT: 225.2 LBS | DIASTOLIC BLOOD PRESSURE: 80 MMHG | HEART RATE: 107 BPM | HEIGHT: 60 IN

## 2024-07-30 DIAGNOSIS — D25.1 INTRAMURAL LEIOMYOMA OF UTERUS: ICD-10-CM

## 2024-07-30 DIAGNOSIS — N95.0 POST-MENOPAUSAL BLEEDING: Primary | ICD-10-CM

## 2024-07-30 DIAGNOSIS — N95.2 ATROPHIC VAGINITIS: ICD-10-CM

## 2024-07-30 PROCEDURE — 58100 BIOPSY OF UTERUS LINING: CPT | Performed by: OBSTETRICS & GYNECOLOGY

## 2024-07-30 PROCEDURE — 99204 OFFICE O/P NEW MOD 45 MIN: CPT | Mod: 25 | Performed by: OBSTETRICS & GYNECOLOGY

## 2024-07-30 PROCEDURE — G0463 HOSPITAL OUTPT CLINIC VISIT: HCPCS | Mod: 25

## 2024-07-30 PROCEDURE — G0463 HOSPITAL OUTPT CLINIC VISIT: HCPCS

## 2024-07-30 PROCEDURE — 88305 TISSUE EXAM BY PATHOLOGIST: CPT | Mod: 26 | Performed by: PATHOLOGY

## 2024-07-30 PROCEDURE — 88305 TISSUE EXAM BY PATHOLOGIST: CPT | Mod: TC | Performed by: OBSTETRICS & GYNECOLOGY

## 2024-07-30 RX ORDER — ESTRADIOL 10 UG/1
10 INSERT VAGINAL
Qty: 24 TABLET | Refills: 3 | Status: SHIPPED | OUTPATIENT
Start: 2024-08-01

## 2024-07-30 ASSESSMENT — PAIN SCALES - GENERAL: PAINLEVEL: NO PAIN (0)

## 2024-07-30 NOTE — PROGRESS NOTES
CC:  Consult from Dr. Solorzano for postmenopausal Bleeding  HPI:  Mayte Parmar is a 63 year old female is a   P1 (vag).   Menopause was at age 50.  She has had longstanding vaginitis sx with discharge, dryness, itching and now pinkish discharge/spotting intermittently over last 3 months.     Patients records are available and reviewed at today's visit.    Past GYN history: Miscarriage/D and C x 2.  Uterine fibroid.    Pelvic pain: Yes  Abnormal Discharge: Yes  Previous work-up: Yes  US nml except 3.8 cm posterior uterine fibroid.  Unchanged since prior US in 2019.  Endometrial stipe 5mm.   Abnormal pap: No  Vaginitis symptoms;  Yes  HRT:  No         Last PAP smear:  Normal 2024.  Negative multiplex 6/24      Past Medical History:   Diagnosis Date    Diabetes (H)     Idiopathic chronic gout of foot without tophus, unspecified laterality 12/24/2019    Morbid obesity (H) 12/24/2019    Type 2 diabetes mellitus without complication, without long-term current use of insulin (H) 12/24/2019       Past Surgical History:   Procedure Laterality Date    BREAST BIOPSY, CORE RT/LT Right 2011    DILATION AND CURETTAGE  1987    missed ab    DILATION AND CURETTAGE  1997    missed ab    MAMMOGRAM - HIM SCAN  2011    MASTECTOMY Right 2011    due to infected biospy site. No cancer    PAP DIAGNOSTIC SMEAR  1990       Family History   Problem Relation Age of Onset    Obesity Mother     Coronary Artery Disease Father     Depression Father     Obesity Father     Abdominal Aortic Aneurysm Father 73        cause of death       Allergies: Patient has no known allergies.    Current Outpatient Medications   Medication Sig Dispense Refill    ibuprofen (ADVIL/MOTRIN) 200 MG tablet Take 200 mg by mouth every 4 hours as needed for mild pain      cefadroxil (DURICEF) 500 MG capsule Take 1 capsule by mouth 2 times daily (Patient not taking: Reported on 7/30/2024)      indomethacin (INDOCIN) 25 MG capsule Take 1 capsule (25 mg) by mouth 2 times daily  (with meals) (Patient not taking: Reported on 7/30/2024) 60 capsule 1       ROS:  CONSTITUTIONAL: NEGATIVE for fever, chills, change in weight    GI: NEGATIVE for nausea, abdominal pain, heartburn, or change in bowel habits  : NEGATIVE for frequency, dysuria, hematuria, vaginal discharge    EXAM:  Blood pressure 126/80, pulse 107, height 1.524 m (5'), weight 102.2 kg (225 lb 3.2 oz), SpO2 98%, not currently breastfeeding.   BMI= Body mass index is 43.98 kg/m .  General - pleasant female in no acute distress.  Abdomen - soft, nontender, nondistended, no hepatosplenomegaly.  Pelvic - EG: normal adult female, BUS: within normal limits, Vagina:markedly atrophic.  Cervix: no lesions or CMT, Uterus: firm, normal sized and nontender, Adnexae: no masses or tenderness.  Rectovaginal - deferred.  Musculoskeletal - no gross deformities.  Neurological - normal strength, sensation, and mental status.    PROCEDURE:  After informed consent was obtained from the patient, a speculum was placed in the vagina to visualize the cervix.  The cervix was then swabbed with a betadine.  Tenaculum was applied to the anterior cervical lip. Endometrial biopsy pipelle was passed through the cervical os and tissue obtained.  Uterus sounded to 7.5 cm.  Tenaculum was removed and sites were hemostatic. There were no complications. The patient tolerated the procedure well with a minimal amount of cramping noted.  Specimen was sent to pathology.       ASSESSMENT/PLAN:    (N95.0) Post-menopausal bleeding  Comment: likely secondary to atrophy  Plan: Surgical Pathology Exam-endometrial biopsy pending  If nml no further w/up unless continued/recurrent bleeding.    Pt has my card and phone number to call as needed if problems in the interim or she does not hear her results.      Uterine fibroid: Stable/unchanged/asymptomatic.     Atrophic vaginitis, symptomatic.  If nml endometrial bx discussed vaginal estrogen treatment.  Vagifem tabs 10mcg twice wkly.   R/B/A discussed and pt desires to proceed.                       Mendoza Rogel MD

## 2024-08-02 ENCOUNTER — HOSPITAL ENCOUNTER (OUTPATIENT)
Dept: BONE DENSITY | Facility: HOSPITAL | Age: 64
Discharge: HOME | End: 2024-08-02
Attending: STUDENT IN AN ORGANIZED HEALTH CARE EDUCATION/TRAINING PROGRAM | Admitting: STUDENT IN AN ORGANIZED HEALTH CARE EDUCATION/TRAINING PROGRAM
Payer: COMMERCIAL

## 2024-08-02 DIAGNOSIS — Z78.0 ASYMPTOMATIC MENOPAUSAL STATE: ICD-10-CM

## 2024-08-02 PROCEDURE — 77080 DXA BONE DENSITY AXIAL: CPT

## 2024-08-03 LAB — NONINV COLON CA DNA+OCC BLD SCRN STL QL: NEGATIVE

## 2024-08-05 LAB
PATH REPORT.COMMENTS IMP SPEC: NORMAL
PATH REPORT.FINAL DX SPEC: NORMAL
PATH REPORT.GROSS SPEC: NORMAL
PATH REPORT.MICROSCOPIC SPEC OTHER STN: NORMAL
PATH REPORT.RELEVANT HX SPEC: NORMAL
PHOTO IMAGE: NORMAL

## 2024-10-04 ENCOUNTER — ANCILLARY PROCEDURE (OUTPATIENT)
Dept: MAMMOGRAPHY | Facility: OTHER | Age: 64
End: 2024-10-04
Attending: STUDENT IN AN ORGANIZED HEALTH CARE EDUCATION/TRAINING PROGRAM
Payer: COMMERCIAL

## 2024-10-04 DIAGNOSIS — Z12.31 ENCOUNTER FOR SCREENING MAMMOGRAM FOR BREAST CANCER: ICD-10-CM

## 2024-10-04 DIAGNOSIS — N64.4 BREAST PAIN: ICD-10-CM

## 2024-10-04 PROCEDURE — 77065 DX MAMMO INCL CAD UNI: CPT | Mod: TC,LT

## 2024-10-15 ENCOUNTER — OFFICE VISIT (OUTPATIENT)
Dept: FAMILY MEDICINE | Facility: OTHER | Age: 64
End: 2024-10-15
Attending: STUDENT IN AN ORGANIZED HEALTH CARE EDUCATION/TRAINING PROGRAM
Payer: COMMERCIAL

## 2024-10-15 VITALS
TEMPERATURE: 98.3 F | BODY MASS INDEX: 44.57 KG/M2 | OXYGEN SATURATION: 96 % | SYSTOLIC BLOOD PRESSURE: 124 MMHG | HEART RATE: 87 BPM | WEIGHT: 227 LBS | HEIGHT: 60 IN | DIASTOLIC BLOOD PRESSURE: 80 MMHG

## 2024-10-15 DIAGNOSIS — L60.0 INGROWN TOENAIL OF LEFT FOOT: Primary | ICD-10-CM

## 2024-10-15 DIAGNOSIS — M17.11 ARTHRITIS OF RIGHT KNEE: ICD-10-CM

## 2024-10-15 PROCEDURE — 99213 OFFICE O/P EST LOW 20 MIN: CPT | Mod: 25 | Performed by: STUDENT IN AN ORGANIZED HEALTH CARE EDUCATION/TRAINING PROGRAM

## 2024-10-15 PROCEDURE — 20610 DRAIN/INJ JOINT/BURSA W/O US: CPT | Mod: RT | Performed by: STUDENT IN AN ORGANIZED HEALTH CARE EDUCATION/TRAINING PROGRAM

## 2024-10-15 PROCEDURE — 250N000011 HC RX IP 250 OP 636: Mod: JZ | Performed by: STUDENT IN AN ORGANIZED HEALTH CARE EDUCATION/TRAINING PROGRAM

## 2024-10-15 PROCEDURE — G0463 HOSPITAL OUTPT CLINIC VISIT: HCPCS | Mod: 25

## 2024-10-15 PROCEDURE — G0463 HOSPITAL OUTPT CLINIC VISIT: HCPCS

## 2024-10-15 RX ORDER — TRIAMCINOLONE ACETONIDE 40 MG/ML
40 INJECTION, SUSPENSION INTRA-ARTICULAR; INTRAMUSCULAR ONCE
Status: COMPLETED | OUTPATIENT
Start: 2024-10-15 | End: 2024-10-15

## 2024-10-15 RX ADMIN — TRIAMCINOLONE ACETONIDE 40 MG: 40 INJECTION, SUSPENSION INTRA-ARTICULAR; INTRAMUSCULAR at 16:41

## 2024-10-15 ASSESSMENT — PAIN SCALES - GENERAL: PAINLEVEL: NO PAIN (0)

## 2024-10-15 NOTE — PROGRESS NOTES
Assessment & Plan     Ingrown toenail of left foot  Left toenail appears to be ingrown  Recommend epsom salt soaks and cutting toenails straight across to prevent onychocryptosis  Large toe does not appear to be acutely infected  I recommend referral to podiatry for toenail cares and for consideration of possible wedge resection.   - Orthopedic  Referral; Future    Arthritis of right knee  Significant pain in knees bilaterally, right >left  XR of knee from 2/27/2023 reviewed and patient has severe OA of the right knee  She had knee injection about 5 months ago. She tells me her relief lasted about 5 months.  She may be able to get injections next year again but this is temporizing measure.  She will need to consider having her knees replaced in the future.    Right knee injected today and she tolerated the procedure very well with significant immediate pain relief.  I am hopeful she will have lasting relief until April or May of next year.    Advised patient to rest as much as possible for the next 48-72 hours to allow the steroid to take effect  Education done and return precautions given.    - triamcinolone (KENALOG-40) injection 40 mg  - lidocaine 1 % 4 mL    PROCEDURE:  Right knee joint injection for severe OA.  Inferolateral approach  After a discussion of risks, benefits and side effects of procedure, informed patient consent was obtained.   The right knee was prepped with 3 betadine swabs. Using 3 cc of 1% lidocaine mixed with 40 mg of Kenalog, the right knee was successfully injected without complication.  Patient did experience some pain relief following injection.   Reviewed lighter activities for the next three days, with subsequent increase in activity. Symptoms that would warrant urgent follow up reviewed.       No follow-ups on file.    Arelis Hu is a 64 year old, presenting for the following health issues:  Pain        10/15/2024     3:54 PM   Additional Questions   Roomed by  Cecilia HAWKINS   Accompanied by self     History of Present Illness       Heart Failure:  She presents for follow up of heart failure. She is not experiencing shortness of breath at night, with rest or with activity  She is not experiencing any lower extremity edema.   She denies orthopenea and is not coughing at night. Patient is not checking weight daily. She has recently had a None.  She has no side effects from medications.  She has had no other medical visits for heart failure since the last visit.    She eats 2-3 servings of fruits and vegetables daily.She consumes 0 sweetened beverage(s) daily.She exercises with enough effort to increase her heart rate 10 to 19 minutes per day.  She exercises with enough effort to increase her heart rate 7 days per week.   She is taking medications regularly.     Last injection in May.  Has been doing better.  Right is worse.          Musculoskeletal problem/pain    Duration: ongoing- has noticed it has been harder the past 3 months to maneuver   Description  Location: both knees, right is worst   Intensity:  moderate  Accompanying signs and symptoms: snapping and cracking   History  Previous similar problem: YES  Previous evaluation:  x-ray  Precipitating or alleviating factors:  Trauma or overuse: YES- fell 2 lema ago   Aggravating factors include: sitting, standing, walking, climbing stairs, and overuse  Therapies tried and outcome: cortisone injections, Advil           Review of Systems  Constitutional, HEENT, cardiovascular, pulmonary, GI, , musculoskeletal, neuro, skin, endocrine and psych systems are negative, except as otherwise noted.      Objective    /80 (BP Location: Right arm, Patient Position: Sitting, Cuff Size: Adult Large)   Pulse 87   Temp 98.3  F (36.8  C) (Tympanic)   Ht 1.524 m (5')   Wt 103 kg (227 lb)   SpO2 96%   BMI 44.33 kg/m    Body mass index is 44.33 kg/m .  Physical Exam   GENERAL: alert and no distress  EYES: Eyes grossly normal to  inspection, PERRL and conjunctivae and sclerae normal  MS: no gross musculoskeletal defects noted, no edema.  Right leg palpable crepitus. Mild medial and lateral joint line tenderness  SKIN: no suspicious lesions or rashes  NEURO: Normal strength and tone, mentation intact and speech normal  PSYCH: mentation appears normal, affect normal/bright          Signed Electronically by: Miroslava Solorzano MD

## 2024-10-15 NOTE — RESULT ENCOUNTER NOTE
Hi Dr. Matos,   Do you recommend an US to follow-up as this was a diagnostic mammogram or is there another type of diagnostic mammogram that I am not aware?  Thank you so much    -Miroslava

## 2024-10-18 ENCOUNTER — TELEPHONE (OUTPATIENT)
Dept: MAMMOGRAPHY | Facility: OTHER | Age: 64
End: 2024-10-18

## 2024-10-18 ENCOUNTER — ANCILLARY PROCEDURE (OUTPATIENT)
Dept: MAMMOGRAPHY | Facility: OTHER | Age: 64
End: 2024-10-18
Attending: STUDENT IN AN ORGANIZED HEALTH CARE EDUCATION/TRAINING PROGRAM
Payer: COMMERCIAL

## 2024-10-18 DIAGNOSIS — R92.8 ABNORMAL MAMMOGRAM: ICD-10-CM

## 2024-10-18 PROCEDURE — 77065 DX MAMMO INCL CAD UNI: CPT | Mod: TC,LT

## 2024-10-18 NOTE — TELEPHONE ENCOUNTER
Spoke to patient to let her know we never received any of her previous mammograms from the outside facility so our radiologist did read her mammogram and would like additional imaging. Patient able to come today.     Alyssa REED RN

## 2024-10-22 ENCOUNTER — ANCILLARY PROCEDURE (OUTPATIENT)
Dept: MAMMOGRAPHY | Facility: HOSPITAL | Age: 64
End: 2024-10-22
Attending: RADIOLOGY
Payer: COMMERCIAL

## 2024-10-22 ENCOUNTER — HOSPITAL ENCOUNTER (OUTPATIENT)
Facility: HOSPITAL | Age: 64
Discharge: HOME | End: 2024-10-22
Attending: RADIOLOGY | Admitting: RADIOLOGY
Payer: COMMERCIAL

## 2024-10-22 ENCOUNTER — HOSPITAL ENCOUNTER (OUTPATIENT)
Dept: MAMMOGRAPHY | Facility: HOSPITAL | Age: 64
Discharge: HOME | End: 2024-10-22
Attending: SURGERY
Payer: COMMERCIAL

## 2024-10-22 ENCOUNTER — OFFICE VISIT (OUTPATIENT)
Dept: FAMILY MEDICINE | Facility: OTHER | Age: 64
End: 2024-10-22
Attending: STUDENT IN AN ORGANIZED HEALTH CARE EDUCATION/TRAINING PROGRAM
Payer: COMMERCIAL

## 2024-10-22 VITALS — OXYGEN SATURATION: 99 % | SYSTOLIC BLOOD PRESSURE: 144 MMHG | DIASTOLIC BLOOD PRESSURE: 83 MMHG | HEART RATE: 86 BPM

## 2024-10-22 VITALS
SYSTOLIC BLOOD PRESSURE: 118 MMHG | DIASTOLIC BLOOD PRESSURE: 68 MMHG | TEMPERATURE: 97.7 F | HEART RATE: 92 BPM | OXYGEN SATURATION: 99 % | WEIGHT: 221.8 LBS | BODY MASS INDEX: 43.32 KG/M2

## 2024-10-22 DIAGNOSIS — M17.12 ARTHRITIS OF LEFT KNEE: ICD-10-CM

## 2024-10-22 DIAGNOSIS — M25.562 CHRONIC PAIN OF LEFT KNEE: Primary | ICD-10-CM

## 2024-10-22 DIAGNOSIS — R92.1 CALCIFICATION OF LEFT BREAST: ICD-10-CM

## 2024-10-22 DIAGNOSIS — G89.29 CHRONIC PAIN OF LEFT KNEE: Primary | ICD-10-CM

## 2024-10-22 PROCEDURE — 20610 DRAIN/INJ JOINT/BURSA W/O US: CPT | Mod: LT | Performed by: STUDENT IN AN ORGANIZED HEALTH CARE EDUCATION/TRAINING PROGRAM

## 2024-10-22 PROCEDURE — 272N000715 MA STEREOTACTIC BREAST BIOPSY VACUUM LT

## 2024-10-22 PROCEDURE — 999N000065 MA POST PROCEDURE LEFT

## 2024-10-22 PROCEDURE — 88305 TISSUE EXAM BY PATHOLOGIST: CPT | Mod: 26 | Performed by: PATHOLOGY

## 2024-10-22 PROCEDURE — 250N000011 HC RX IP 250 OP 636: Mod: JZ | Performed by: STUDENT IN AN ORGANIZED HEALTH CARE EDUCATION/TRAINING PROGRAM

## 2024-10-22 PROCEDURE — 76098 X-RAY EXAM SURGICAL SPECIMEN: CPT

## 2024-10-22 PROCEDURE — 250N000011 HC RX IP 250 OP 636: Performed by: RADIOLOGY

## 2024-10-22 PROCEDURE — G0463 HOSPITAL OUTPT CLINIC VISIT: HCPCS

## 2024-10-22 PROCEDURE — 88305 TISSUE EXAM BY PATHOLOGIST: CPT | Mod: TC | Performed by: SURGERY

## 2024-10-22 PROCEDURE — 250N000009 HC RX 250: Performed by: RADIOLOGY

## 2024-10-22 RX ORDER — TRIAMCINOLONE ACETONIDE 40 MG/ML
40 INJECTION, SUSPENSION INTRA-ARTICULAR; INTRAMUSCULAR ONCE
Status: COMPLETED | OUTPATIENT
Start: 2024-10-22 | End: 2024-10-22

## 2024-10-22 RX ORDER — LIDOCAINE HYDROCHLORIDE 10 MG/ML
5-10 INJECTION, SOLUTION EPIDURAL; INFILTRATION; INTRACAUDAL; PERINEURAL
Status: COMPLETED | OUTPATIENT
Start: 2024-10-22 | End: 2024-10-22

## 2024-10-22 RX ADMIN — LIDOCAINE HYDROCHLORIDE,EPINEPHRINE BITARTRATE 15 ML: 20; .01 INJECTION, SOLUTION INFILTRATION; PERINEURAL at 08:28

## 2024-10-22 RX ADMIN — LIDOCAINE HYDROCHLORIDE 4 ML: 10 INJECTION, SOLUTION EPIDURAL; INFILTRATION; INTRACAUDAL; PERINEURAL at 08:27

## 2024-10-22 RX ADMIN — TRIAMCINOLONE ACETONIDE 40 MG: 40 INJECTION, SUSPENSION INTRA-ARTICULAR; INTRAMUSCULAR at 16:23

## 2024-10-22 ASSESSMENT — ACTIVITIES OF DAILY LIVING (ADL)
ADLS_ACUITY_SCORE: 35
ADLS_ACUITY_SCORE: 35

## 2024-10-22 ASSESSMENT — PAIN SCALES - GENERAL: PAINLEVEL: MILD PAIN (2)

## 2024-10-22 NOTE — PROGRESS NOTES
Patient here for stereotactic guided biopsy of left breast.  Procedure reviewed with patient by writer and radiologist, questions answered.  Time out performed prior to biopsy.  Biopsy completed by radiologist, clip placed.  Pressure held to biopsy site for 10 minutes.  Medipore dressing and steri strips  applied.   Post clip mammogram completed.  Sports bra and ice packs given to patient.  Discharge instructions reviewed with patient, patient verbalizes understanding of instructions.  Discharged to home in stable condition with no evidence of bleeding from biopsy site.      Alyssa REED RN

## 2024-10-22 NOTE — PROGRESS NOTES
Assessment & Plan     Chronic pain of left knee  See below  - lidocaine 1 % 3 mL    Arthritis of left knee  Significant pain in knees bilaterally, right >left  XR of knee from 2/27/2023 reviewed and patient has severe OA of the right knee.  No XR of left knee to review but based on findings of the right knee, can safely assume she has arthritis.   She had bilateral knee injections about 5 months ago. She tells me her relief lasted about 5 months.  She may be able to get injections next year again but this is temporizing measure.  She will need to consider having her knees replaced in the future.    Left knee injected today and she tolerated the procedure very well with significant immediate pain relief.  I am hopeful she will have lasting relief until April or May of next year.    Advised patient to rest as much as possible for the next 48-72 hours to allow the steroid to take effect  - triamcinolone (KENALOG-40) injection 40 mg  - lidocaine 1 % 3 mL    PROCEDURE:  Left knee joint injection for severe OA.  Inferolateral approach  After a discussion of risks, benefits and side effects of procedure, informed patient consent was obtained.   The right knee was prepped with 3 betadine swabs. Using 3 cc of 1% lidocaine mixed with 40 mg of Kenalog, the left knee was successfully injected without complication.  Patient did experience some pain relief following injection.   Reviewed lighter activities for the next three days, with subsequent increase in activity. Symptoms that would warrant urgent follow up reviewed.        BMI  Estimated body mass index is 43.32 kg/m  as calculated from the following:    Height as of 10/15/24: 1.524 m (5').    Weight as of this encounter: 100.6 kg (221 lb 12.8 oz).   Weight management plan: Discussed healthy diet and exercise guidelines        No follow-ups on file.    Arelis Hu is a 64 year old, presenting for the following health issues:  Knee Pain        10/22/2024     3:52 PM    Additional Questions   Roomed by Mike Duque   Accompanied by Self         10/22/2024     3:52 PM   Patient Reported Additional Medications   Patient reports taking the following new medications none     Knee Pain    History of Present Illness       Heart Failure:  She presents for follow up of heart failure. She is not experiencing shortness of breath at night, with rest or with activity  She is not experiencing any lower extremity edema.   She denies orthopenea and is not coughing at night. Patient is not checking weight daily. She has recently had a None.  She has no side effects from medications.  She has had no other medical visits for heart failure since the last visit.    She eats 2-3 servings of fruits and vegetables daily.She consumes 0 sweetened beverage(s) daily.She exercises with enough effort to increase her heart rate 10 to 19 minutes per day.  She exercises with enough effort to increase her heart rate 7 days per week.   She is taking medications regularly.     Last Echo:   Echo result w/o MOPS: Interpretation SummaryNo pericardial effusion is present.Global and regional left ventricular function is normal with an EF of 60-65%.Grade I or early diastolic dysfunction.Global right ventricular function is normal.Both atria appear normal.Trace mitral insufficiency is present.No aortic stenosis is present.Mild tricuspid insufficiency is present.      Musculoskeletal problem/pain    Duration: Ongoing  Description  Location: Left knee   Intensity:  severe  Accompanying signs and symptoms: none  History  Previous similar problem: YES- has had injections in the left knee before   Previous evaluation:  Unknown   Precipitating or alleviating factors:  Trauma or overuse: YES- overuse   Aggravating factors include: standing  Therapies tried and outcome: Knee injections.           Review of Systems  Constitutional, HEENT, cardiovascular, pulmonary, GI, , musculoskeletal, neuro, skin, endocrine and psych  systems are negative, except as otherwise noted.      Objective    /68 (BP Location: Left arm, Patient Position: Sitting, Cuff Size: Adult Large)   Pulse 92   Temp 97.7  F (36.5  C) (Tympanic)   Wt 100.6 kg (221 lb 12.8 oz)   SpO2 99%   BMI 43.32 kg/m    Body mass index is 43.32 kg/m .  Physical Exam   GENERAL: alert and no distress  EYES: Eyes grossly normal to inspection, PERRL and conjunctivae and sclerae normal  HENT: ear canals and TM's normal, nose and mouth without ulcers or lesions  NECK: no adenopathy, no asymmetry, masses, or scars  RESP: lungs clear to auscultation - no rales, rhonchi or wheezes  CV: regular rate and rhythm, normal S1 S2, no S3 or S4, no murmur, click or rub, no peripheral edema  ABDOMEN: soft, nontender, no hepatosplenomegaly, no masses and bowel sounds normal  MS: no gross musculoskeletal defects noted, no edema  SKIN: no suspicious lesions or rashes  NEURO: Normal strength and tone, mentation intact and speech normal  PSYCH: mentation appears normal, affect normal/bright          Signed Electronically by: Miroslava Solorzano MD

## 2024-10-23 ENCOUNTER — TELEPHONE (OUTPATIENT)
Dept: MAMMOGRAPHY | Facility: OTHER | Age: 64
End: 2024-10-23

## 2024-10-23 NOTE — TELEPHONE ENCOUNTER
Left message to see how patient was doing post biopsy and to call with any concerns.     Alyssa REED RN

## 2024-10-24 LAB
PATH REPORT.COMMENTS IMP SPEC: NORMAL
PATH REPORT.COMMENTS IMP SPEC: NORMAL
PATH REPORT.FINAL DX SPEC: NORMAL
PATH REPORT.GROSS SPEC: NORMAL
PATH REPORT.MICROSCOPIC SPEC OTHER STN: NORMAL
PHOTO IMAGE: NORMAL

## 2024-10-25 ENCOUNTER — TELEPHONE (OUTPATIENT)
Dept: MAMMOGRAPHY | Facility: OTHER | Age: 64
End: 2024-10-25

## 2024-10-25 NOTE — TELEPHONE ENCOUNTER
Spoke to patient regarding breast biopsy results. Patient has further questions regarding if she could possible have a mastectomy on that side as she has a mastectomy on the other side. Told patient I would reach out to surgeon to address this and give her a call back.     Alyssa REED RN

## 2024-10-30 ENCOUNTER — OFFICE VISIT (OUTPATIENT)
Dept: PODIATRY | Facility: OTHER | Age: 64
End: 2024-10-30
Attending: PODIATRIST
Payer: COMMERCIAL

## 2024-10-30 ENCOUNTER — OFFICE VISIT (OUTPATIENT)
Dept: SURGERY | Facility: OTHER | Age: 64
End: 2024-10-30
Attending: SURGERY
Payer: COMMERCIAL

## 2024-10-30 VITALS
TEMPERATURE: 98 F | DIASTOLIC BLOOD PRESSURE: 88 MMHG | OXYGEN SATURATION: 95 % | HEART RATE: 99 BPM | SYSTOLIC BLOOD PRESSURE: 136 MMHG

## 2024-10-30 VITALS
DIASTOLIC BLOOD PRESSURE: 82 MMHG | SYSTOLIC BLOOD PRESSURE: 119 MMHG | HEART RATE: 105 BPM | TEMPERATURE: 97.9 F | OXYGEN SATURATION: 95 %

## 2024-10-30 DIAGNOSIS — N64.4 MASTALGIA: Primary | ICD-10-CM

## 2024-10-30 DIAGNOSIS — M20.5X2 ADDUCTOVARUS ROTATION OF TOE, ACQUIRED, LEFT: ICD-10-CM

## 2024-10-30 DIAGNOSIS — L60.0 INGROWN TOENAIL: Primary | ICD-10-CM

## 2024-10-30 PROCEDURE — 11750 EXCISION NAIL&NAIL MATRIX: CPT | Performed by: PODIATRIST

## 2024-10-30 PROCEDURE — 99203 OFFICE O/P NEW LOW 30 MIN: CPT | Performed by: SURGERY

## 2024-10-30 PROCEDURE — 99203 OFFICE O/P NEW LOW 30 MIN: CPT | Mod: 25 | Performed by: PODIATRIST

## 2024-10-30 PROCEDURE — G0463 HOSPITAL OUTPT CLINIC VISIT: HCPCS | Mod: 25

## 2024-10-30 ASSESSMENT — PAIN SCALES - GENERAL
PAINLEVEL_OUTOF10: NO PAIN (0)
PAINLEVEL_OUTOF10: NO PAIN (0)

## 2024-10-30 NOTE — PATIENT INSTRUCTIONS
Nail procedure care:  -Start epsom salt soaks tomorrow. Soak the foot 1-2 times a day for 20 minutes.  -Apply an antibiotic cream, gauze and a bandaid over the toe for the first week after the procedure.  -After one week, continue the epsom salt soaks, but switch to a betadine dressing. Apply a small amount of betadine on gauze or dab the betadine over the toe with gauze and apply another dry gauze over the toe followed by a band aid or tape.  -Do not apply a band aid directly over the nail procedure site without gauze or it will trap too much moisture beneath the band aid.  Note: Continue the epsom salt soaks and dressings every day until the wound is fully healed. You should not see drainage on the bandage for at least two days in a row. Call the clinic if the wound is still moderately draining two weeks after the procedure.    Keep the toe covered at all times until it is completely healed.  -You may develop a black scab over the nail bed--let this fall off on its own and don't pick at it.  -The toe may drain for 2-3 weeks. It is normal for it to have a clear drainage.    Watching for signs of infection:  If the toe has a thick, white pus coming from the procedure site or if the the toe becomes red, swollen, painful, or you begin to feel sick (fever/chills/nausea/vomitting), return to the podiatry clinic immediately or to the Emergency Department room if after hours.      Podiatry can be reached directly at [phone]. Leave a voicemail if there is not an answer.

## 2024-10-30 NOTE — PROGRESS NOTES
Chief complaint: Patient presents with:  Ingrown Toenail: Bilateral great toes      History of Present Illness: This 64 year old NIDDM II female is seen at the request of No ref. provider found for evaluation and suggestions of management of bilateral ingrown toenails on the bilateral hallux. She has had ingrown toenails for years She digs them out on her own but it only lasts a few days. They have been getting more painful.    She also has a painful area on her LEFT lateral fifth toe. It has been painful for months and she tried to clip the bump with a clipper, but it is still prominent. She is wondering what is causing this bump and how she can treat it.    No further pedal complaints today.       Lab Results   Component Value Date    A1C 5.4 06/21/2024    A1C 5.4 09/21/2021    A1C 5.7 12/24/2019           /82 (BP Location: Left arm, Patient Position: Sitting, Cuff Size: Adult Large)   Pulse 105   Temp 97.9  F (36.6  C) (Tympanic)   SpO2 95%     Patient Active Problem List   Diagnosis    Morbid obesity (H)    Type 2 diabetes mellitus without complication, without long-term current use of insulin (H)    Idiopathic chronic gout of foot without tophus, unspecified laterality       Past Surgical History:   Procedure Laterality Date    BREAST BIOPSY, CORE RT/LT Right 2011    DILATION AND CURETTAGE  1987    missed ab    DILATION AND CURETTAGE  1997    missed ab    MAMMOGRAM - HIM SCAN  2011    MASTECTOMY Right 2011    due to infected biospy site. No cancer    PAP DIAGNOSTIC SMEAR  1990       Current Outpatient Medications   Medication Sig Dispense Refill    cefadroxil (DURICEF) 500 MG capsule Take 1 capsule by mouth 2 times daily.      ibuprofen (ADVIL/MOTRIN) 200 MG tablet Take 200 mg by mouth every 4 hours as needed for mild pain      estradiol (VAGIFEM) 10 MCG TABS vaginal tablet Place 1 tablet (10 mcg) vaginally twice a week (Patient not taking: Reported on 10/30/2024) 24 tablet 3    indomethacin (INDOCIN)  25 MG capsule Take 1 capsule (25 mg) by mouth 2 times daily (with meals) (Patient not taking: Reported on 10/30/2024) 60 capsule 1     No current facility-administered medications for this visit.        No Known Allergies    Family History   Problem Relation Age of Onset    Obesity Mother     Coronary Artery Disease Father     Depression Father     Obesity Father     Abdominal Aortic Aneurysm Father 73        cause of death       Social History     Socioeconomic History    Marital status:      Spouse name: None    Number of children: 1    Years of education: None    Highest education level: None   Tobacco Use    Smoking status: Never     Passive exposure: Past (As a child)    Smokeless tobacco: Never   Vaping Use    Vaping status: Never Used   Substance and Sexual Activity    Alcohol use: Never    Drug use: Never    Sexual activity: Yes     Partners: Male     Birth control/protection: None   Social History Narrative    Son with Cerebral palsy     Social Drivers of Health     Financial Resource Strain: Low Risk  (6/21/2024)    Financial Resource Strain     Within the past 12 months, have you or your family members you live with been unable to get utilities (heat, electricity) when it was really needed?: No   Food Insecurity: Low Risk  (6/21/2024)    Food Insecurity     Within the past 12 months, did you worry that your food would run out before you got money to buy more?: No     Within the past 12 months, did the food you bought just not last and you didn t have money to get more?: No   Transportation Needs: Low Risk  (6/21/2024)    Transportation Needs     Within the past 12 months, has lack of transportation kept you from medical appointments, getting your medicines, non-medical meetings or appointments, work, or from getting things that you need?: No   Physical Activity: Inactive (6/21/2024)    Exercise Vital Sign     Days of Exercise per Week: 0 days     Minutes of Exercise per Session: 0 min   Stress: No  Stress Concern Present (6/21/2024)    [employer] - Occupational Stress Questionnaire     Feeling of Stress : Not at all   Social Connections: Unknown (6/21/2024)    Social Connection and Isolation Panel [NHANES]     Frequency of Social Gatherings with Friends and Family: More than three times a week   Interpersonal Safety: Low Risk  (10/30/2024)    Interpersonal Safety     Do you feel physically and emotionally safe where you currently live?: Yes     Within the past 12 months, have you been hit, slapped, kicked or otherwise physically hurt by someone?: No     Within the past 12 months, have you been humiliated or emotionally abused in other ways by your partner or ex-partner?: No   Housing Stability: Low Risk  (6/21/2024)    Housing Stability     Do you have housing? : Yes     Are you worried about losing your housing?: No       ROS: 10 point ROS neg other than the symptoms noted above in the HPI.  EXAM  Constitutional: healthy, alert, and no distress    Psychiatric: mentation appears normal and affect normal/bright    VASCULAR:  -Dorsalis pedis pulse +1/4 b/l  -Posterior tibial pulse +1/4 b/l  -Capillary refill time < 3 seconds to b/l hallux  NEURO:  -Light touch sensation intact to b/l plantar forefoot  DERM:  -Skin temperature, texture and turgor WNL b/l  -Incurvation to the bilateral toenail border of the bilateral hallux  ---No erythema and mild edema to the nail border  ---No open wounds and no drainage  ---No severe erythema, no ascending erythema, no calor, no purulence, no malodor, no other SOI.  MSK:  -Pain on palpation to bilateral toenail border of the bilateral hallux  -Mild bony prominence on the distal lateral fifth toe IPJ  -Adductovarus of LEFT fifth toe  -Muscle strength of ankles +5/5 for dorsiflexion, plantarflexion, ABDUction and ADDuction b/l    ============================================================    ASSESSMENT:  (L60.0) Ingrown toenail  (primary encounter  diagnosis)    (M20.5X2) Adductovarus rotation of toe, acquired, left        PLAN:  -Patient evaluated and examined. Treatment options discussed with no educational barriers noted.    Ingrown toenail(s):  -Discussed nail procedure options and etiologies and treatments for ingrown toenails. Conservatively, patient could opt for a slant back today and keep monitoring the toe since there are no SOI. Discussed risks and benefits and healing course of a nail border avulsion vs. Matrixectomy including post procedure infection or a non-healing wound, both of which could lead to a life threatening infection or amputation of the foot or leg or a proximal amputation. Patient understands the risks and benefits and has decided to proceed with the following:    -Matrixectomy of bilateral hallux bilateral toenail border: Written and verbal consent obtained after reviewing risks and benefits of the procedure. Patient understands that although phenol is used in attempt to prevent regrowth of the ingrown toenail, the nail can still grow back. There is also a risk of post procedure infection. A severe foot infection could lead to a proximal foot or leg amputation or loss of life, so the patient is advised to return to podiatry or the ED immediately if the patient notices any SOI. The patient is in agreement with this plan and wishes to proceed with the procedure. A time-out was performed to identify the correct patient, limb, digit and procedure.    An alcohol prep pad was applied to  to the base of the bilateral hallux. Each hallux was injected with 6 mL of 1:1 of 2% Lidocaine plain and 0.5% marcaine plain in a ring block fashion at the base of the toe(s). Adequate local anesthesia was obtained. A ring tournicot was applied to the bilateral hallux and a chloroprep was applied to the bilateral hallux. A curette  was used to loosen the nail from the underlying nail bed. An English Anvil and a hemostat were then used to remove the nail  "bilateral toenail border of the bilateral hallux. A total of three applications of phenol were applied for 30 seconds per application per nail border The bilateral hallux  was rinsed thoroughly with alcohol. The tournicot was removed from the bilateral hallux and there was a prompt hyperemic response to the bilateral hallux. The wound was then dressed with an Silvadene, gauze and 1\" coban. The patient was educated on after procedure care including daily epsom salt soaks starting tomorrow followed by dressing of the toe with an antibiotic cream and a bandaid until the wound site on the toe stops draining (2-3 weeks). Provided education on how to look for signs of infection (redness, swelling, pain, purulence, fever, chills, nausea, vomiting) and the patient was instructed to return to the clinic or Emergency Department immediately if there are any signs of infection.    -----------------------------------------    Adductovarus of LEFT fifth toe:  -The LEFT fifth toe has an adductovarus tilt. This is common for fifth toes. The toe has a more moderate tilt and she has fat pad loss of the lateral fifth toe. A bony prominence is noted on the lateral toe and the bony prominence is being pushed from the ground in her shoes. This is contributing to the pain.  ---Dispensed size small toe sleeve. Patient may find these at [facility]'s or [facility]. Patient should not wear the toe sleeve(s) at night, but only wear the sleeve in shoes and/or socks during the day. The sleeves are re-usable and hand washable until they wear out.  ---Patient does not want a radiograph today to evaluate the extent of the bone spur. If she still has pain at her follow-up appointment, then she will consider a follow-up appointment. If pain worsens, an IPJ arthroplasty and/or bone resection may reduce the pressure and pain.  -This is an acute, uncomplicated illness/injury with OTC treatment options reviewed.     Diabetes Mellitus: Patient's DM is managed by " their PCP. The DM appears to be stable. Patient's last HbA1C was 5.4% on 06/21/2024. Patient says she is diet-controlled.    -Patient in agreement with the above treatment plan and all of patient's questions were answered.      Return to clinic 2-3 months to evaluate LEFT lateral fifth toe pain        Tawanna Washington DPM

## 2024-11-01 NOTE — PROGRESS NOTES
[facility] Consultation    CC:  Breast biopsy     HPI:  This 64 year old year old female is seen at the request of Dr. Solorzano for evaluation after breast biopsy. She had a routine screening mammogram with suspicious calcifications which required stereotactic biopsy. She reports that she has had a mastectomy prior on the right side when she was living in [facility] for what sounds like an infection. She has been losing lots of weight and is tired of having her left breast. She denies infections but used to. She is diabetic. She has no family history of breast cancer.     Past Medical History:   Diagnosis Date    Diabetes (H)     Idiopathic chronic gout of foot without tophus, unspecified laterality 12/24/2019    Morbid obesity (H) 12/24/2019    Type 2 diabetes mellitus without complication, without long-term current use of insulin (H) 12/24/2019       Past Surgical History:   Procedure Laterality Date    BREAST BIOPSY, CORE RT/LT Right 2011    DILATION AND CURETTAGE  1987    missed ab    DILATION AND CURETTAGE  1997    missed ab    MAMMOGRAM - HIM SCAN  2011    MASTECTOMY Right 2011    due to infected biospy site. No cancer    PAP DIAGNOSTIC SMEAR  1990       No Known Allergies    Current Outpatient Medications   Medication Sig Dispense Refill    cefadroxil (DURICEF) 500 MG capsule Take 1 capsule by mouth 2 times daily.      estradiol (VAGIFEM) 10 MCG TABS vaginal tablet Place 1 tablet (10 mcg) vaginally twice a week (Patient not taking: Reported on 10/30/2024) 24 tablet 3    ibuprofen (ADVIL/MOTRIN) 200 MG tablet Take 200 mg by mouth every 4 hours as needed for mild pain      indomethacin (INDOCIN) 25 MG capsule Take 1 capsule (25 mg) by mouth 2 times daily (with meals) (Patient not taking: Reported on 10/30/2024) 60 capsule 1     No current facility-administered medications for this visit.       HABITS:    Social History     Tobacco Use    Smoking status: Never     Passive exposure: Past (As a  child)    Smokeless tobacco: Never   Vaping Use    Vaping status: Never Used   Substance Use Topics    Alcohol use: Never    Drug use: Never       Family History   Problem Relation Age of Onset    Obesity Mother     Coronary Artery Disease Father     Depression Father     Obesity Father     Abdominal Aortic Aneurysm Father 73        cause of death       REVIEW OF SYSTEMS:  Ten point review of systems negative except those mentioned in the HPI.     OBJECTIVE:    /88   Pulse 99   Temp 98  F (36.7  C) (Tympanic)   SpO2 95%     GENERAL: Generally appears well, in no distress with appropriate affect.  HEENT:   Sclerae anicteric - normocephalic atraumatic   Respiratory:  No acute distress, no splinting, clear to auscultation   Cardiovascular:  Regular Rate and Rhythm  Breast: Right side breast flaps no erythema, left breast, pendulous no skin break down, masses or skin changes.   :  deferred  Extremities:  Extremities normal. No deformities, edema, or skin discoloration.  Skin:  no suspicious lesions or rashes  Neurological: grossly intact  Psych:  Alert, oriented, affect appropriate with normal decision making ability.    IMPRESSION:    Recent breast biopsy for calcifications on screening mammogram, has had prior mastectomy on the left for infectious etiology. She has lost significant weight and is interested in having her other breast removed. I discussed that I don't have clear indication for removal. She denies recurrent infections. Went through breast cancer risk factors and is average risk. Will have her follow up with plastic surgery to discuss other possible ways to achieve this.     PLAN:    Plastic surgery referral.     James Robb MD,     11/1/2024  2:40 PM

## 2024-12-02 ENCOUNTER — OFFICE VISIT (OUTPATIENT)
Dept: PODIATRY | Facility: OTHER | Age: 64
End: 2024-12-02
Attending: PODIATRIST
Payer: COMMERCIAL

## 2024-12-02 VITALS
DIASTOLIC BLOOD PRESSURE: 78 MMHG | TEMPERATURE: 98.9 F | OXYGEN SATURATION: 98 % | HEART RATE: 104 BPM | SYSTOLIC BLOOD PRESSURE: 141 MMHG

## 2024-12-02 DIAGNOSIS — L60.0 INGROWN TOENAIL: Primary | ICD-10-CM

## 2024-12-02 PROCEDURE — 99213 OFFICE O/P EST LOW 20 MIN: CPT | Performed by: PODIATRIST

## 2024-12-02 PROCEDURE — G0463 HOSPITAL OUTPT CLINIC VISIT: HCPCS

## 2024-12-02 ASSESSMENT — PAIN SCALES - GENERAL: PAINLEVEL_OUTOF10: MILD PAIN (2)

## 2024-12-02 NOTE — PROGRESS NOTES
Chief complaint: Patient presents with:  Ingrown Toenail: Infected      History of Present Illness: This 64 year old NIDDM II female is for follow-up management of a bilateral hallux bilateral toenail border matrixectomy. She says she was doing well with healing until she ran out of dressing supplies. She purchased tape at the store but she thinks she was allergic to it so she stopped applyin g a dressing on the toes. She then stopped soaking her feet and her bilateral hallux became swollen and painful. She still has tenderness at the proximal medial and lateral toenail border and she is wondering how she should treat the painful toes.    No further pedal complaints today.           Lab Results   Component Value Date    A1C 5.4 06/21/2024    A1C 5.4 09/21/2021    A1C 5.7 12/24/2019           BP (!) 141/78 (BP Location: Left arm, Patient Position: Sitting, Cuff Size: Adult Large)   Pulse 104   Temp 98.9  F (37.2  C) (Tympanic)   SpO2 98%     Patient Active Problem List   Diagnosis    Morbid obesity (H)    Type 2 diabetes mellitus without complication, without long-term current use of insulin (H)    Idiopathic chronic gout of foot without tophus, unspecified laterality       Past Surgical History:   Procedure Laterality Date    BREAST BIOPSY, CORE RT/LT Right 2011    DILATION AND CURETTAGE  1987    missed ab    DILATION AND CURETTAGE  1997    missed ab    MAMMOGRAM - HIM SCAN  2011    MASTECTOMY Right 2011    due to infected biospy site. No cancer    PAP DIAGNOSTIC SMEAR  1990       Current Outpatient Medications   Medication Sig Dispense Refill    ibuprofen (ADVIL/MOTRIN) 200 MG tablet Take 200 mg by mouth every 4 hours as needed for mild pain      cefadroxil (DURICEF) 500 MG capsule Take 1 capsule by mouth 2 times daily. (Patient not taking: Reported on 12/2/2024)      estradiol (VAGIFEM) 10 MCG TABS vaginal tablet Place 1 tablet (10 mcg) vaginally twice a week (Patient not taking: Reported on 10/30/2024) 24  tablet 3    indomethacin (INDOCIN) 25 MG capsule Take 1 capsule (25 mg) by mouth 2 times daily (with meals) (Patient not taking: Reported on 10/30/2024) 60 capsule 1     No current facility-administered medications for this visit.        No Known Allergies    Family History   Problem Relation Age of Onset    Obesity Mother     Coronary Artery Disease Father     Depression Father     Obesity Father     Abdominal Aortic Aneurysm Father 73        cause of death       Social History     Socioeconomic History    Marital status:      Spouse name: None    Number of children: 1    Years of education: None    Highest education level: None   Tobacco Use    Smoking status: Never     Passive exposure: Past (As a child)    Smokeless tobacco: Never   Vaping Use    Vaping status: Never Used   Substance and Sexual Activity    Alcohol use: Never    Drug use: Never    Sexual activity: Yes     Partners: Male     Birth control/protection: None   Social History Narrative    Son with Cerebral palsy     Social Drivers of Health     Financial Resource Strain: Low Risk  (6/21/2024)    Financial Resource Strain     Within the past 12 months, have you or your family members you live with been unable to get utilities (heat, electricity) when it was really needed?: No   Food Insecurity: Low Risk  (6/21/2024)    Food Insecurity     Within the past 12 months, did you worry that your food would run out before you got money to buy more?: No     Within the past 12 months, did the food you bought just not last and you didn t have money to get more?: No   Transportation Needs: Low Risk  (6/21/2024)    Transportation Needs     Within the past 12 months, has lack of transportation kept you from medical appointments, getting your medicines, non-medical meetings or appointments, work, or from getting things that you need?: No   Physical Activity: Inactive (6/21/2024)    Exercise Vital Sign     Days of Exercise per Week: 0 days     Minutes of  Exercise per Session: 0 min   Stress: No Stress Concern Present (6/21/2024)    [employer] - Occupational Stress Questionnaire     Feeling of Stress : Not at all   Social Connections: Unknown (6/21/2024)    Social Connection and Isolation Panel [NHANES]     Frequency of Social Gatherings with Friends and Family: More than three times a week   Interpersonal Safety: Low Risk  (10/30/2024)    Interpersonal Safety     Do you feel physically and emotionally safe where you currently live?: Yes     Within the past 12 months, have you been hit, slapped, kicked or otherwise physically hurt by someone?: No     Within the past 12 months, have you been humiliated or emotionally abused in other ways by your partner or ex-partner?: No   Housing Stability: Low Risk  (6/21/2024)    Housing Stability     Do you have housing? : Yes     Are you worried about losing your housing?: No       ROS: 10 point ROS neg other than the symptoms noted above in the HPI.  EXAM  Constitutional: healthy, alert, and no distress    Psychiatric: mentation appears normal and affect normal/bright    VASCULAR:  -Dorsalis pedis pulse +1/4 b/l  -Posterior tibial pulse +1/4 b/l  -Capillary refill time < 3 seconds to b/l hallux  NEURO:  -Light touch sensation intact to b/l plantar forefoot  DERM:  -Skin temperature, texture and turgor WNL b/l  -Matrixectomy site to the bilateral toenail border of the bilateral hallux  ---Mild incurvation of the remaining bilateral medial and lateral hallux toenail border  ---No open wounds and no drainage  ---Mild rubor with no calor on the bilateral hallux bilateral medial and lateral toenail border  ---Minimal rash at the proximal nail border of the bilateral hallux  ---No severe erythema, no ascending erythema, no calor, no purulence, no malodor, no other SOI    MSK:  -Tenderness on palpation to bilateral toenail border of the bilateral hallux ad the proximal medial and lateral toenail  "border  -Mild bony prominence on the distal lateral fifth toe IPJ  -Adductovarus of LEFT fifth toe  -Muscle strength of ankles +5/5 for dorsiflexion, plantarflexion, ABDUction and ADDuction b/l    ============================================================    ASSESSMENT:    (L60.0) Ingrown toenail  (primary encounter diagnosis)        PLAN:  -Patient evaluated and examined. Treatment options discussed with no educational barriers noted.    Ingrown toenail(s):  -Discussed nail procedure options and etiologies and treatments for ingrown toenails. The matrixectomy sites appear healed or almost healed. The new toenail border at the medial and lateral nail border of the bilateral hallux appears mildly incurvated. The nails borders are not infected today. There is mild incurvation and mild tenderness at the new medial and lateral toenail borders. The patient has thicker bilateral hallux toenails which may be causing the increased pressure on the nail borders. There is no calor and there are no signs of infection today. She is advised to do twice daily epsom salt soaks and apply an antibiotic ointment, gauze and a bandage. She has most recently been applying betadine, but will switch to the antibiotic ointment to soften the nail borders. Will check on this in two weeks, but she is advised to call the clinic if the pain worsens. May consider a toenail avulsion or a bilateral nail border avulsion of the \"new\" medial and lateral toenail borders. There is no evidence of regrowth of the previous matrixectomy sites at this time. She is in agreement with this plan.  -Applied antibiotic ointment, gauze and a bandage to the bilateral hallux toenail.    -This is an acute, uncomplicated illness/injury with OTC treatment options reviewed.     Diabetes Mellitus: Patient's DM is managed by their PCP. The DM appears to be stable. Patient's last HbA1C was 5.4% on 06/21/2024. Patient says she is diet-controlled.    -Patient in agreement " with the above treatment plan and all of patient's questions were answered.      Return to clinic 2-3 weeks to evaluate LEFT lateral fifth toe pain        Tawanna Washington DPM

## 2024-12-16 ENCOUNTER — OFFICE VISIT (OUTPATIENT)
Dept: PODIATRY | Facility: OTHER | Age: 64
End: 2024-12-16
Attending: PODIATRIST
Payer: COMMERCIAL

## 2024-12-16 VITALS
HEART RATE: 92 BPM | DIASTOLIC BLOOD PRESSURE: 83 MMHG | TEMPERATURE: 97.3 F | OXYGEN SATURATION: 93 % | SYSTOLIC BLOOD PRESSURE: 126 MMHG

## 2024-12-16 DIAGNOSIS — L60.0 INGROWN TOENAIL: Primary | ICD-10-CM

## 2024-12-16 PROCEDURE — G0463 HOSPITAL OUTPT CLINIC VISIT: HCPCS

## 2024-12-16 PROCEDURE — 99212 OFFICE O/P EST SF 10 MIN: CPT | Performed by: PODIATRIST

## 2024-12-16 ASSESSMENT — PAIN SCALES - GENERAL: PAINLEVEL_OUTOF10: NO PAIN (1)

## 2024-12-16 NOTE — PROGRESS NOTES
Chief complaint: Patient presents with:  Ingrown Toenail: Bilateral great toenails    History of Present Illness: This 64 year old NIDDM II female is for follow-up management of a bilateral hallux bilateral toenail border matrixectomy. She says she is still soaking her feet and applying Neosporin. She has had minimal drainage. Her pain is greatly improving but she still sometimes feels pain from the toenails. She has a very busy week preparing for [place] and she plans to be on her feet a lot.    No further pedal complaints today.      Lab Results   Component Value Date    A1C 5.4 06/21/2024    A1C 5.4 09/21/2021    A1C 5.7 12/24/2019         /83 (BP Location: Left arm, Patient Position: Sitting, Cuff Size: Adult Large)   Pulse 92   Temp 97.3  F (36.3  C) (Tympanic)   SpO2 93%     Patient Active Problem List   Diagnosis    Morbid obesity (H)    Type 2 diabetes mellitus without complication, without long-term current use of insulin (H)    Idiopathic chronic gout of foot without tophus, unspecified laterality       Past Surgical History:   Procedure Laterality Date    BREAST BIOPSY, CORE RT/LT Right 2011    DILATION AND CURETTAGE  1987    missed ab    DILATION AND CURETTAGE  1997    missed ab    MAMMOGRAM - HIM SCAN  2011    MASTECTOMY Right 2011    due to infected biospy site. No cancer    PAP DIAGNOSTIC SMEAR  1990       Current Outpatient Medications   Medication Sig Dispense Refill    cefadroxil (DURICEF) 500 MG capsule Take 1 capsule by mouth 2 times daily.      estradiol (VAGIFEM) 10 MCG TABS vaginal tablet Place 1 tablet (10 mcg) vaginally twice a week 24 tablet 3    ibuprofen (ADVIL/MOTRIN) 200 MG tablet Take 200 mg by mouth every 4 hours as needed for mild pain      indomethacin (INDOCIN) 25 MG capsule Take 1 capsule (25 mg) by mouth 2 times daily (with meals) 60 capsule 1     No current facility-administered medications for this visit.        No Known Allergies    Family History   Problem Relation  Age of Onset    Obesity Mother     Coronary Artery Disease Father     Depression Father     Obesity Father     Abdominal Aortic Aneurysm Father 73        cause of death       Social History     Socioeconomic History    Marital status:      Spouse name: None    Number of children: 1    Years of education: None    Highest education level: None   Tobacco Use    Smoking status: Never     Passive exposure: Past (As a child)    Smokeless tobacco: Never   Vaping Use    Vaping status: Never Used   Substance and Sexual Activity    Alcohol use: Never    Drug use: Never    Sexual activity: Yes     Partners: Male     Birth control/protection: None   Social History Narrative    Son with Cerebral palsy     Social Drivers of Health     Financial Resource Strain: Low Risk  (6/21/2024)    Financial Resource Strain     Within the past 12 months, have you or your family members you live with been unable to get utilities (heat, electricity) when it was really needed?: No   Food Insecurity: Low Risk  (6/21/2024)    Food Insecurity     Within the past 12 months, did you worry that your food would run out before you got money to buy more?: No     Within the past 12 months, did the food you bought just not last and you didn t have money to get more?: No   Transportation Needs: Low Risk  (6/21/2024)    Transportation Needs     Within the past 12 months, has lack of transportation kept you from medical appointments, getting your medicines, non-medical meetings or appointments, work, or from getting things that you need?: No   Physical Activity: Inactive (6/21/2024)    Exercise Vital Sign     Days of Exercise per Week: 0 days     Minutes of Exercise per Session: 0 min   Stress: No Stress Concern Present (6/21/2024)    [employer] - Occupational Stress Questionnaire     Feeling of Stress : Not at all   Social Connections: Unknown (6/21/2024)    Social Connection and Isolation Panel [NHANES]     Frequency of  Social Gatherings with Friends and Family: More than three times a week   Interpersonal Safety: Low Risk  (10/30/2024)    Interpersonal Safety     Do you feel physically and emotionally safe where you currently live?: Yes     Within the past 12 months, have you been hit, slapped, kicked or otherwise physically hurt by someone?: No     Within the past 12 months, have you been humiliated or emotionally abused in other ways by your partner or ex-partner?: No   Housing Stability: Low Risk  (6/21/2024)    Housing Stability     Do you have housing? : Yes     Are you worried about losing your housing?: No       ROS: 10 point ROS neg other than the symptoms noted above in the HPI.  EXAM  Constitutional: healthy, alert, and no distress    Psychiatric: mentation appears normal and affect normal/bright    VASCULAR:  -Dorsalis pedis pulse +1/4 b/l  -Posterior tibial pulse +1/4 b/l  -Capillary refill time < 3 seconds to b/l hallux  NEURO:  -Light touch sensation intact to b/l plantar forefoot  DERM:  -Skin temperature, texture and turgor WNL b/l  -Matrixectomy site to the bilateral toenail border of the bilateral hallux  ---Minimal incurvation of the remaining bilateral medial and lateral hallux toenail border  ---Remaining toenail moderately thickened and partially loose on the nail bed  ---No drainage  ---No severe erythema, no ascending erythema, no calor, no purulence, no malodor, no other SOI  MSK:  -Minimal tenderness on palpation to bilateral toenail border of the bilateral hallux and the proximal medial and lateral toenail border  -Mild bony prominence on the distal lateral fifth toe IPJ  -Adductovarus of LEFT fifth toe  -Muscle strength of ankles +5/5 for dorsiflexion, plantarflexion, ABDUction and ADDuction b/l    ============================================================    ASSESSMENT:    (L60.0) Ingrown toenail  (primary encounter diagnosis)        PLAN:  -Patient evaluated and examined. Treatment options discussed  with no educational barriers noted.    Ingrown toenail(s):  -Discussed nail procedure options and etiologies and treatments for ingrown toenails. The matrixectomy sites appear healed. The new toenail border at the medial and lateral nail border of the bilateral hallux appears mildly incurvated, but it is improved. There are no open wounds, there is no drainage, and the toenails appear stable. The patient has minimal pain. If her toenails start causing pain, she would like the bilateral hallux toenails permanently removed, but she has a busy week and minimal pain today. She would like to call as needed for the toenails. She may continue soaking the feet every 1-2 days and applying an antibiotic ointment if the pain worsens. She is in agreement with this plan.    -This is an acute, uncomplicated illness/injury with OTC treatment options reviewed.     Diabetes Mellitus: Patient's DM is managed by their PCP. The DM appears to be stable. Patient's last HbA1C was 5.4% on 06/21/2024. Patient says she is diet-controlled.    -Patient in agreement with the above treatment plan and all of patient's questions were answered.      Return to clinic as needed        Tawanna Washington DPM

## 2025-02-12 ENCOUNTER — OFFICE VISIT (OUTPATIENT)
Dept: PODIATRY | Facility: OTHER | Age: 65
End: 2025-02-12
Attending: PODIATRIST
Payer: COMMERCIAL

## 2025-02-12 VITALS
OXYGEN SATURATION: 99 % | SYSTOLIC BLOOD PRESSURE: 130 MMHG | HEART RATE: 105 BPM | TEMPERATURE: 98.8 F | DIASTOLIC BLOOD PRESSURE: 83 MMHG

## 2025-02-12 DIAGNOSIS — L60.0 INGROWN TOENAIL: Primary | ICD-10-CM

## 2025-02-12 PROCEDURE — G0463 HOSPITAL OUTPT CLINIC VISIT: HCPCS | Mod: 25

## 2025-02-12 PROCEDURE — 11750 EXCISION NAIL&NAIL MATRIX: CPT | Performed by: PODIATRIST

## 2025-02-12 ASSESSMENT — PAIN SCALES - GENERAL: PAINLEVEL_OUTOF10: MILD PAIN (3)

## 2025-02-12 NOTE — PROGRESS NOTES
Chief complaint: Patient presents with:  Toenail: removal    History of Present Illness: This 64 year old NIDDM II female is for follow-up management of a bilateral hallux bilateral toenail border ingrown toenail. She had a bilateral hallux bilateral toenail border matrixectomy on 10/3/2024. The procedure sites healed, but she has continued to have pain from the remaining toenails. She would like to have both toenails permanently removed today.    No further pedal complaints today.       Lab Results   Component Value Date    A1C 5.4 06/21/2024    A1C 5.4 09/21/2021    A1C 5.7 12/24/2019         /83 (BP Location: Left arm, Patient Position: Sitting, Cuff Size: Adult Large)   Pulse 105   Temp 98.8  F (37.1  C) (Tympanic)   SpO2 99%      Patient Active Problem List   Diagnosis    Morbid obesity (H)    Type 2 diabetes mellitus without complication, without long-term current use of insulin (H)    Idiopathic chronic gout of foot without tophus, unspecified laterality       Past Surgical History:   Procedure Laterality Date    BREAST BIOPSY, CORE RT/LT Right 2011    DILATION AND CURETTAGE  1987    missed ab    DILATION AND CURETTAGE  1997    missed ab    MAMMOGRAM - HIM SCAN  2011    MASTECTOMY Right 2011    due to infected biospy site. No cancer    PAP DIAGNOSTIC SMEAR  1990       Current Outpatient Medications   Medication Sig Dispense Refill    cefadroxil (DURICEF) 500 MG capsule Take 1 capsule by mouth 2 times daily. (Patient not taking: Reported on 2/12/2025)      estradiol (VAGIFEM) 10 MCG TABS vaginal tablet Place 1 tablet (10 mcg) vaginally twice a week (Patient not taking: Reported on 2/12/2025) 24 tablet 3    ibuprofen (ADVIL/MOTRIN) 200 MG tablet Take 200 mg by mouth every 4 hours as needed for mild pain (Patient not taking: Reported on 2/12/2025)      indomethacin (INDOCIN) 25 MG capsule Take 1 capsule (25 mg) by mouth 2 times daily (with meals) (Patient not taking: Reported on 2/12/2025) 60 capsule 1      No current facility-administered medications for this visit.        No Known Allergies    Family History   Problem Relation Age of Onset    Obesity Mother     Coronary Artery Disease Father     Depression Father     Obesity Father     Abdominal Aortic Aneurysm Father 73        cause of death       Social History     Socioeconomic History    Marital status:      Spouse name: None    Number of children: 1    Years of education: None    Highest education level: None   Tobacco Use    Smoking status: Never     Passive exposure: Past (As a child)    Smokeless tobacco: Never   Vaping Use    Vaping status: Never Used   Substance and Sexual Activity    Alcohol use: Never    Drug use: Never    Sexual activity: Yes     Partners: Male     Birth control/protection: None   Social History Narrative    Son with Cerebral palsy     Social Drivers of Health     Financial Resource Strain: Low Risk  (6/21/2024)    Financial Resource Strain     Within the past 12 months, have you or your family members you live with been unable to get utilities (heat, electricity) when it was really needed?: No   Food Insecurity: Low Risk  (6/21/2024)    Food Insecurity     Within the past 12 months, did you worry that your food would run out before you got money to buy more?: No     Within the past 12 months, did the food you bought just not last and you didn t have money to get more?: No   Transportation Needs: Low Risk  (6/21/2024)    Transportation Needs     Within the past 12 months, has lack of transportation kept you from medical appointments, getting your medicines, non-medical meetings or appointments, work, or from getting things that you need?: No   Physical Activity: Inactive (6/21/2024)    Exercise Vital Sign     Days of Exercise per Week: 0 days     Minutes of Exercise per Session: 0 min   Stress: No Stress Concern Present (6/21/2024)    [employer] - Occupational Stress Questionnaire     Feeling of  Stress : Not at all   Social Connections: Unknown (6/21/2024)    Social Connection and Isolation Panel [NHANES]     Frequency of Social Gatherings with Friends and Family: More than three times a week   Interpersonal Safety: Low Risk  (10/30/2024)    Interpersonal Safety     Do you feel physically and emotionally safe where you currently live?: Yes     Within the past 12 months, have you been hit, slapped, kicked or otherwise physically hurt by someone?: No     Within the past 12 months, have you been humiliated or emotionally abused in other ways by your partner or ex-partner?: No   Housing Stability: Low Risk  (6/21/2024)    Housing Stability     Do you have housing? : Yes     Are you worried about losing your housing?: No       ROS: 10 point ROS neg other than the symptoms noted above in the HPI.  EXAM  Constitutional: healthy, alert, and no distress    Psychiatric: mentation appears normal and affect normal/bright    VASCULAR:  -Dorsalis pedis pulse +1/4 b/l  -Posterior tibial pulse +1/4 b/l  -Capillary refill time < 3 seconds to b/l hallux  NEURO:  -Light touch sensation intact to b/l plantar forefoot  DERM:  -Skin temperature, texture and turgor WNL b/l  -Thickened toenail of the bilateral hallux  ---Minimal incurvation of the remaining bilateral medial and lateral hallux toenail border  ---Remaining toenail moderately thickened and partially loose on the nail bed  ---No drainage  ---No severe erythema, no ascending erythema, no calor, no purulence, no malodor, no other SOI  MSK:  -Mild tenderness on palpation to the medial and lateral nail border of the bilateral hallux toenail  -Mild bony prominence on the distal lateral fifth toe IPJ  -Adductovarus of LEFT fifth toe  -Muscle strength of ankles +5/5 for dorsiflexion, plantarflexion, ABDUction and ADDuction b/l    ============================================================    ASSESSMENT:    (L60.0) Ingrown toenail  (primary encounter  diagnosis)        PLAN:  -Patient evaluated and examined. Treatment options discussed with no educational barriers noted.    Ingrown toenail(s):  -Discussed nail procedure options and etiologies and treatments for ingrown toenails. The matrixectomy sites appear healed. The new toenail border at the medial and lateral nail border of the bilateral hallux appears mildly incurvated, but it is improved. There are no open wounds, there is no drainage, and the toenails appear stable. The patient has minimal pain. If her toenails start causing pain, she would like the bilateral hallux toenails permanently removed, but she has a busy week and minimal pain today. She would like to call as needed for the toenails. She may continue soaking the feet every 1-2 days and applying an antibiotic ointment if the pain worsens. She is in agreement with this plan.    -This is an acute, uncomplicated illness/injury with OTC treatment options reviewed.     Diabetes Mellitus: Patient's DM is managed by their PCP. The DM appears to be stable. Patient's last HbA1C was 5.4% on 06/21/2024. Patient says she is diet-controlled.      Ingrown toenail(s):  -Discussed nail procedure options and etiologies and treatments for ingrown toenails. Conservatively, patient could opt for a slant back today and keep monitoring the toe since there are no SOI. Discussed risks and benefits and healing course of a nail border avulsion vs. Matrixectomy including post procedure infection or a non-healing wound, both of which could lead to a life threatening infection or amputation of the foot or leg or a proximal amputation. Patient understands the risks and benefits and has decided to proceed with the following:    -Matrixectomy of bilateral hallux toenail: Written and verbal consent obtained after reviewing risks and benefits of the procedure. Patient understands that although phenol is used in attempt to prevent regrowth of the ingrown toenail, the nail can still  "grow back. There is also a risk of post procedure infection. A severe foot infection could lead to a proximal foot or leg amputation or loss of life, so the patient is advised to return to podiatry or the ED immediately if the patient notices any SOI. The patient is in agreement with this plan and wishes to proceed with the procedure. A time-out was performed to identify the correct patient, limb, digit and procedure.    An alcohol prep pad was applied to  to the base of the bilateral hallux. Each hallux digit was injected with 6 mL of 1:1 of 2% Lidocaine plain and 0.5% marcaine plain in a ring block fashion at the base of the toe(s). Adequate local anesthesia was obtained. A ring tournicot was applied to the digit and a chloroprep was applied to the hallux. A freer was used to loosen the nail from the underlying nail bed. An English Anvil and a hemostat were then used to remove the nail in total. A total of three applications of phenol were applied for 30 seconds per application per toe. The digit was rinsed thoroughly with alcohol. The tournicot was removed from the bilateral hallux and there was a prompt hyperemic response to the bilateral hallux. The wounds were then dressed with an Silvadene, gauze and 1\" coban. The patient was educated on after procedure care including daily epsom salt soaks starting tomorrow followed by dressing of the toe with an antibiotic cream and a bandaid until the wound site on the toe stops draining (2-3 weeks). Provided education on how to look for signs of infection (redness, swelling, pain, purulence, fever, chills, nausea, vomiting) and the patient was instructed to return to the clinic or Emergency Department immediately if there are any signs of infection.      -Patient in agreement with the above treatment plan and all of patient's questions were answered.      Return to clinic as needed        Tawanna Washington DPM  "

## 2025-02-12 NOTE — PATIENT INSTRUCTIONS
Nail procedure care:  -Start epsom salt soaks tomorrow. Soak the foot 1-2 times a day for 20 minutes.  -Apply an antibiotic cream, gauze and a bandaid over the toe for the first week after the procedure.  -After one week, continue the epsom salt soaks, but switch to a betadine dressing. Apply a small amount of betadine on gauze or dab the betadine over the toe with gauze and apply another dry gauze over the toe followed by a band aid or tape.  -Do not apply a band aid directly over the nail procedure site without gauze or it will trap too much moisture beneath the band aid.  Note: Continue the epsom salt soaks and dressings every day until the wound is fully healed. You should not see drainage on the bandage for at least two days in a row. Call the clinic if the wound is still moderately draining two weeks after the procedure.    Keep the toe covered at all times until it is completely healed.  -You may develop a black scab over the nail bed--let this fall off on its own and don't pick at it.  -The toe may drain for 2-3 weeks. It is normal for it to have a clear drainage.    Watching for signs of infection:  If the toe has a thick, white pus coming from the procedure site or if the the toe becomes red, swollen, painful, or you begin to feel sick (fever/chills/nausea/vomitting), return to the podiatry clinic immediately or to the Emergency Department room if after hours.      Podiatry can be reached directly at [phone]. Leave a voicemail if there is not an answer.    Thank you for allowing  and our Podiatry team to participate in your care. Please call our office at [phone] with scheduling questions or with any other questions or concerns.

## 2025-02-26 ENCOUNTER — OFFICE VISIT (OUTPATIENT)
Dept: PODIATRY | Facility: OTHER | Age: 65
End: 2025-02-26
Attending: PODIATRIST
Payer: COMMERCIAL

## 2025-02-26 VITALS
TEMPERATURE: 97 F | SYSTOLIC BLOOD PRESSURE: 130 MMHG | HEART RATE: 66 BPM | OXYGEN SATURATION: 99 % | DIASTOLIC BLOOD PRESSURE: 81 MMHG

## 2025-02-26 DIAGNOSIS — Z98.890 STATUS POST SURGICAL REMOVAL OF NAIL MATRIX OF TOE: Primary | ICD-10-CM

## 2025-02-26 PROCEDURE — G0463 HOSPITAL OUTPT CLINIC VISIT: HCPCS

## 2025-02-26 ASSESSMENT — PAIN SCALES - GENERAL: PAINLEVEL_OUTOF10: NO PAIN (0)

## 2025-02-26 NOTE — PROGRESS NOTES
Chief complaint: Patient presents with:  Wound Check: Matrix follow up    History of Present Illness: This 64 year old NIDDM II female is seen for post management of a matrixectomy of the bilateral hallux toenail (performed on 02/12/2025).    She is doing a daily foot soak. She tried to switch from an antibiotic cream to betadine, but the betadine burned so she returned to the antibiotic ointment. She still has drainage from the dressings. The toes are only accommodative painful, but she says she does not have a lot of pain. The toes are the most tender when she is wearing tighter shoes.    No further pedal complaints today.       Lab Results   Component Value Date    A1C 5.4 06/21/2024    A1C 5.4 09/21/2021    A1C 5.7 12/24/2019         /81 (BP Location: Left arm, Patient Position: Sitting, Cuff Size: Adult Large)   Pulse 66   Temp 97  F (36.1  C) (Tympanic)   SpO2 99%      Patient Active Problem List   Diagnosis    Morbid obesity (H)    Type 2 diabetes mellitus without complication, without long-term current use of insulin (H)    Idiopathic chronic gout of foot without tophus, unspecified laterality       Past Surgical History:   Procedure Laterality Date    BREAST BIOPSY, CORE RT/LT Right 2011    DILATION AND CURETTAGE  1987    missed ab    DILATION AND CURETTAGE  1997    missed ab    MAMMOGRAM - HIM SCAN  2011    MASTECTOMY Right 2011    due to infected biospy site. No cancer    PAP DIAGNOSTIC SMEAR  1990       Current Outpatient Medications   Medication Sig Dispense Refill    cefadroxil (DURICEF) 500 MG capsule Take 1 capsule by mouth 2 times daily. (Patient not taking: Reported on 2/26/2025)      estradiol (VAGIFEM) 10 MCG TABS vaginal tablet Place 1 tablet (10 mcg) vaginally twice a week (Patient not taking: Reported on 2/26/2025) 24 tablet 3    ibuprofen (ADVIL/MOTRIN) 200 MG tablet Take 200 mg by mouth every 4 hours as needed for mild pain (Patient not taking: Reported on 2/26/2025)       indomethacin (INDOCIN) 25 MG capsule Take 1 capsule (25 mg) by mouth 2 times daily (with meals) (Patient not taking: Reported on 2/26/2025) 60 capsule 1     No current facility-administered medications for this visit.        No Known Allergies    Family History   Problem Relation Age of Onset    Obesity Mother     Coronary Artery Disease Father     Depression Father     Obesity Father     Abdominal Aortic Aneurysm Father 73        cause of death       Social History     Socioeconomic History    Marital status:      Spouse name: None    Number of children: 1    Years of education: None    Highest education level: None   Tobacco Use    Smoking status: Never     Passive exposure: Past (As a child)    Smokeless tobacco: Never   Vaping Use    Vaping status: Never Used   Substance and Sexual Activity    Alcohol use: Never    Drug use: Never    Sexual activity: Yes     Partners: Male     Birth control/protection: None   Social History Narrative    Son with Cerebral palsy     Social Drivers of Health     Financial Resource Strain: Low Risk  (6/21/2024)    Financial Resource Strain     Within the past 12 months, have you or your family members you live with been unable to get utilities (heat, electricity) when it was really needed?: No   Food Insecurity: Low Risk  (6/21/2024)    Food Insecurity     Within the past 12 months, did you worry that your food would run out before you got money to buy more?: No     Within the past 12 months, did the food you bought just not last and you didn t have money to get more?: No   Transportation Needs: Low Risk  (6/21/2024)    Transportation Needs     Within the past 12 months, has lack of transportation kept you from medical appointments, getting your medicines, non-medical meetings or appointments, work, or from getting things that you need?: No   Physical Activity: Inactive (6/21/2024)    Exercise Vital Sign     Days of Exercise per Week: 0 days     Minutes of Exercise per  Session: 0 min   Stress: No Stress Concern Present (6/21/2024)    [employer] [place] [employer] - Occupational Stress Questionnaire     Feeling of Stress : Not at all   Social Connections: Unknown (6/21/2024)    Social Connection and Isolation Panel [NHANES]     Frequency of Social Gatherings with Friends and Family: More than three times a week   Interpersonal Safety: Low Risk  (10/30/2024)    Interpersonal Safety     Do you feel physically and emotionally safe where you currently live?: Yes     Within the past 12 months, have you been hit, slapped, kicked or otherwise physically hurt by someone?: No     Within the past 12 months, have you been humiliated or emotionally abused in other ways by your partner or ex-partner?: No   Housing Stability: Low Risk  (6/21/2024)    Housing Stability     Do you have housing? : Yes     Are you worried about losing your housing?: No       ROS: 10 point ROS neg other than the symptoms noted above in the HPI.  EXAM  Constitutional: healthy, alert, and no distress    Psychiatric: mentation appears normal and affect normal/bright    VASCULAR:  -Dorsalis pedis pulse +1/4 b/l  -Posterior tibial pulse +1/4 b/l  -Capillary refill time < 3 seconds to b/l hallux  NEURO:  -Light touch sensation intact to b/l plantar forefoot  DERM:  -Skin temperature, texture and turgor WNL b/l  -Matrixectomy site of the bilateral hallux  ---Moderate serous drainage  ---Open nail bed through skin layer on bilateral hallux toenail  ---No severe erythema, no ascending erythema, no calor, no purulence, no malodor, no other SOI  MSK:  -Mild tenderness on palpation to the medial and lateral nail border of the bilateral hallux toenail  -Mild bony prominence on the distal lateral fifth toe IPJ  -Adductovarus of LEFT fifth toe  -Muscle strength of ankles +5/5 for dorsiflexion, plantarflexion, ABDUction and ADDuction  b/l    ============================================================    ASSESSMENT:    (Z98.890) Status post surgical removal of nail matrix of toe  (primary encounter diagnosis)        PLAN:  -Patient evaluated and examined. Treatment options discussed with no educational barriers noted.    -Status post matrixectomy (performed on 02/12/2025).  -Outer fibrotic slough removed with gauze.   -Applied a dressing to the wound with Mepilex Ag and coban (patient has a sensitivity to adhesives).   -Patient to change the dressing every two days until healed.  -Patient was instructed to look for signs of infection (redness, swelling, pain, purulence, fever, chills, nausea, vomiting) and to return to podiatry or the emergency department immediately if there are any signs of infection.     Diabetes Mellitus: Patient's DM is managed by their PCP. The DM appears to be stable. Patient's last HbA1C was 5.4% on 06/21/2024. Patient says she is diet-controlled.    -Patient in agreement with the above treatment plan and all of patient's questions were answered.      Return to clinic as needed        Tawanna Washington DPM

## 2025-03-10 ENCOUNTER — TELEPHONE (OUTPATIENT)
Dept: FAMILY MEDICINE | Facility: OTHER | Age: 65
End: 2025-03-10

## 2025-03-10 NOTE — TELEPHONE ENCOUNTER
Attempt # 1  Outcome: Left Message   Comment: Annual physical due after 6/21/2025 with Dr Miroslava Solorzano

## 2025-03-31 ENCOUNTER — TELEPHONE (OUTPATIENT)
Dept: FAMILY MEDICINE | Facility: OTHER | Age: 65
End: 2025-03-31

## 2025-03-31 ENCOUNTER — OFFICE VISIT (OUTPATIENT)
Dept: FAMILY MEDICINE | Facility: OTHER | Age: 65
End: 2025-03-31
Attending: STUDENT IN AN ORGANIZED HEALTH CARE EDUCATION/TRAINING PROGRAM
Payer: COMMERCIAL

## 2025-03-31 ENCOUNTER — HOSPITAL ENCOUNTER (OUTPATIENT)
Dept: CT IMAGING | Facility: HOSPITAL | Age: 65
Discharge: HOME | End: 2025-03-31
Attending: STUDENT IN AN ORGANIZED HEALTH CARE EDUCATION/TRAINING PROGRAM
Payer: COMMERCIAL

## 2025-03-31 VITALS
OXYGEN SATURATION: 99 % | HEIGHT: 60 IN | WEIGHT: 218.8 LBS | HEART RATE: 116 BPM | DIASTOLIC BLOOD PRESSURE: 84 MMHG | SYSTOLIC BLOOD PRESSURE: 132 MMHG | TEMPERATURE: 97.5 F | RESPIRATION RATE: 24 BRPM | BODY MASS INDEX: 42.96 KG/M2

## 2025-03-31 DIAGNOSIS — R10.9 LEFT FLANK PAIN: ICD-10-CM

## 2025-03-31 DIAGNOSIS — Z87.442 HISTORY OF KIDNEY STONES: ICD-10-CM

## 2025-03-31 DIAGNOSIS — R30.0 DYSURIA: Primary | ICD-10-CM

## 2025-03-31 LAB
ALBUMIN UR-MCNC: NEGATIVE MG/DL
APPEARANCE UR: CLEAR
BACTERIA #/AREA URNS HPF: ABNORMAL /HPF
BILIRUB UR QL STRIP: NEGATIVE
COLOR UR AUTO: ABNORMAL
GLUCOSE UR STRIP-MCNC: NEGATIVE MG/DL
HGB UR QL STRIP: NEGATIVE
KETONES UR STRIP-MCNC: ABNORMAL MG/DL
LEUKOCYTE ESTERASE UR QL STRIP: ABNORMAL
NITRATE UR QL: NEGATIVE
PH UR STRIP: 6 [PH] (ref 4.7–8)
RBC URINE: 1 /HPF
SP GR UR STRIP: 1.02 (ref 1–1.03)
SQUAMOUS EPITHELIAL: 2 /HPF
UROBILINOGEN UR STRIP-MCNC: NORMAL MG/DL
WBC URINE: 23 /HPF

## 2025-03-31 PROCEDURE — 81001 URINALYSIS AUTO W/SCOPE: CPT | Mod: ZL | Performed by: STUDENT IN AN ORGANIZED HEALTH CARE EDUCATION/TRAINING PROGRAM

## 2025-03-31 PROCEDURE — G0463 HOSPITAL OUTPT CLINIC VISIT: HCPCS | Mod: 25

## 2025-03-31 PROCEDURE — 74176 CT ABD & PELVIS W/O CONTRAST: CPT

## 2025-03-31 PROCEDURE — 87086 URINE CULTURE/COLONY COUNT: CPT | Mod: ZL | Performed by: STUDENT IN AN ORGANIZED HEALTH CARE EDUCATION/TRAINING PROGRAM

## 2025-03-31 PROCEDURE — G0463 HOSPITAL OUTPT CLINIC VISIT: HCPCS

## 2025-03-31 ASSESSMENT — PAIN SCALES - GENERAL: PAINLEVEL_OUTOF10: MODERATE PAIN (4)

## 2025-03-31 NOTE — PROGRESS NOTES
Assessment & Plan     Dysuria  U/A dirty without significant hematuria  Will await urine culture  No red flag s/s and no CVA tenderness on exam.  VSS and PE reassuring.  No suprapubic TTP  - UA Macroscopic with reflex to Microscopic and Culture; Future  - UA Macroscopic with reflex to Microscopic and Culture  - Urine Culture    Left flank pain  Significant acute left sided flank and low back pain radiating to anterior abdomen  History of renal calculi requiring lithotripsy  U/A not evident- reflex to culture.   No red flag/signs or symptoms of pyelonephritis at this time but anticipatory guidance provided  CT now to evaluate for obstructive calculi  - CT Abdomen Pelvis w/o Contrast; Future    History of kidney stones  As above.  - CT Abdomen Pelvis w/o Contrast; Future        No follow-ups on file.    Arelis Hu is a 64 year old, presenting for the following health issues:  Kidney Problem      3/31/2025     2:49 PM   Additional Questions   Roomed by Mariaelena HAWKINS   Accompanied by self         3/31/2025     2:49 PM   Patient Reported Additional Medications   Patient reports taking the following new medications none     Kidney Problem        Last week wendensday- stomach ache and slept   Saturday night went to bed and had weird pain under left rig cage and took advil  Yesterday morning pain in lower back and wrapped around to abdomen.  Sitting for a while and standing, couldn't even walk.  Has issues with previous kidney stone      Genitourinary - Female  Onset/Duration: Wednesday   Description:   Painful urination (Dysuria): No           Frequency: YES  Blood in urine (Hematuria): YES  Delay in urine (Hesitency): No  Intensity: severe  Progression of Symptoms:  worsening  Accompanying Signs & Symptoms:  Fever/chills: YES  Flank pain: YES  Nausea and vomiting: YES- nausea   Vaginal symptoms: none  Abdominal/Pelvic Pain: YES  History:   History of frequent UTI s: patient states she has a history of possible UTIs  but does not always come in for treatment   History of kidney stones: YES- about 20 years ago   Sexually Active: YES  Possibility of pregnancy: No  Precipitating or alleviating factors: None  Therapies tried and outcome: OTC advil or tylenol       Review of Systems  Constitutional, HEENT, cardiovascular, pulmonary, GI, , musculoskeletal, neuro, skin, endocrine and psych systems are negative, except as otherwise noted.      Objective    /84 (BP Location: Left arm, Patient Position: Sitting, Cuff Size: Adult Large)   Pulse 116   Temp 97.5  F (36.4  C) (Tympanic)   Resp 24   Ht 1.524 m (5')   Wt 99.2 kg (218 lb 12.8 oz)   SpO2 99%   BMI 42.73 kg/m    Body mass index is 42.73 kg/m .  Physical Exam   GENERAL: alert and no distress  EYES: Eyes grossly normal to inspection, PERRL and conjunctivae and sclerae normal  HENT: ear canals and TM's normal, nose and mouth without ulcers or lesions  NECK: no adenopathy, no asymmetry, masses, or scars  RESP: lungs clear to auscultation - no rales, rhonchi or wheezes  CV: regular rate and rhythm, normal S1 S2, no S3 or S4, no murmur, click or rub, no peripheral edema  ABDOMEN: soft, nontender, no hepatosplenomegaly, no masses and bowel sounds normal  MS: no gross musculoskeletal defects noted, no edema  SKIN: no suspicious lesions or rashes  NEURO: Normal strength and tone, mentation intact and speech normal  PSYCH: mentation appears normal, affect normal/bright          Signed Electronically by: Miroslava Solorzano MD

## 2025-04-02 LAB — BACTERIA UR CULT: NORMAL

## 2025-04-08 ENCOUNTER — OFFICE VISIT (OUTPATIENT)
Dept: FAMILY MEDICINE | Facility: OTHER | Age: 65
End: 2025-04-08
Attending: STUDENT IN AN ORGANIZED HEALTH CARE EDUCATION/TRAINING PROGRAM
Payer: COMMERCIAL

## 2025-04-08 VITALS
HEIGHT: 60 IN | TEMPERATURE: 97.5 F | HEART RATE: 86 BPM | OXYGEN SATURATION: 95 % | RESPIRATION RATE: 16 BRPM | SYSTOLIC BLOOD PRESSURE: 136 MMHG | DIASTOLIC BLOOD PRESSURE: 72 MMHG | WEIGHT: 219.2 LBS | BODY MASS INDEX: 43.04 KG/M2

## 2025-04-08 DIAGNOSIS — N20.0 CALCULUS OF KIDNEY: ICD-10-CM

## 2025-04-08 DIAGNOSIS — R10.9 LEFT FLANK PAIN: ICD-10-CM

## 2025-04-08 DIAGNOSIS — R19.5 CHANGE IN STOOL CALIBER: Primary | ICD-10-CM

## 2025-04-08 DIAGNOSIS — R93.3 ABNORMAL CT SCAN, COLON: ICD-10-CM

## 2025-04-08 PROCEDURE — G0463 HOSPITAL OUTPT CLINIC VISIT: HCPCS

## 2025-04-08 ASSESSMENT — PAIN SCALES - GENERAL: PAINLEVEL_OUTOF10: NO PAIN (0)

## 2025-04-08 NOTE — PROGRESS NOTES
Assessment & Plan     Left flank pain  Resolved.  Still has some discomfort in the left flank which is very mild.   We reviewed CT scan results.  Radiographically there is a calculus seen in the bladder most likely representing a recently passed ureteric calculus.  There are also small bilateral intrarenal calculi.  Patient does have remote history of painful kidney stones and remote lithotripsy  Encourage hydration at this time and continued clinical monitoring  She declines referral to urology at this time    Change in stool caliber  Ongoing pencil thin stools for about 1 year.  Continues to pass stool regularly and no melena or hematochezia.  Denies any diarrhea, constipation, fevers, chills, night sweats.    Has been on Keto diet since 2018 which has resulted in 200 pound weight loss.  Changes in stool caliber do not correlate with any other lifestyle or dietary changes  With recent CT, there was questionable bowel wall thickening in the rectum  Patient has never had colonoscopy, will send to Dr. Robb.  She will likely need a pre-op before her procedure.  She agrees to a colonoscopy  - Adult GI  Referral - Procedure Only; Future    Abnormal CT scan, colon  Incidentally noted on CT A/P for renal colic  - Adult GI  Referral - Procedure Only; Future    Calculus of kidney  small bilateral intrarenal calculi, anatomy is otherwise normal  Continue to monitor  Patient declines referral to urology at this time        No follow-ups on file.    Arelis Hu is a 64 year old, presenting for the following health issues:  Kidney Stone(s) Followup        4/8/2025     9:52 AM   Additional Questions   Roomed by Selena miller   Accompanied by Self         4/8/2025     9:52 AM   Patient Reported Additional Medications   Patient reports taking the following new medications None     HPI      Concern - Follow up Kidney Stones  Onset: Had abdomen and flank pain at last visit  Description: CT Abdomen and  Pelvis was done on 3/31/25. Discuss findings of rectal wall thickening  Accompanying Signs & Symptoms: None  Previous history of similar problem: History of kidney stones; Yes  Precipitating factors:        Worsened by: none  Alleviating factors:        Improved by: Advil prn  Therapies tried and outcome: OTC pain relief as needed    Patient reports she her pain is much improved.  Still has some discomfort in the left flank and abdomen, no pain  Stool is very skinny- some of them are pencil skinny- maybe a year of pencil thin stools.  No blood in stool.  Weight has been stable.  No fevers, chills night sweats  On mostly keto diet to lose weight.  Lost 200 pounds since going on diet in August in 2018        Review of Systems  Constitutional, HEENT, cardiovascular, pulmonary, GI, , musculoskeletal, neuro, skin, endocrine and psych systems are negative, except as otherwise noted.      Objective    /72 (BP Location: Left arm, Patient Position: Sitting, Cuff Size: Adult Large)   Pulse 86   Temp 97.5  F (36.4  C) (Tympanic)   Resp 16   Ht 1.524 m (5')   Wt 99.4 kg (219 lb 3.2 oz)   SpO2 95%   BMI 42.81 kg/m    Body mass index is 42.81 kg/m .  Physical Exam   GENERAL: alert and no distress  EYES: Eyes grossly normal to inspection, PERRL and conjunctivae and sclerae normal  HENT: ear canals and TM's normal, nose and mouth without ulcers or lesions  NECK: no adenopathy, no asymmetry, masses, or scars  RESP: lungs clear to auscultation - no rales, rhonchi or wheezes  CV: regular rate and rhythm, normal S1 S2, no S3 or S4, no murmur, click or rub, no peripheral edema  ABDOMEN: soft, nontender, no hepatosplenomegaly, no masses and bowel sounds normal  MS: no gross musculoskeletal defects noted, no edema  SKIN: no suspicious lesions or rashes  NEURO: Normal strength and tone, mentation intact and speech normal  PSYCH: mentation appears normal, affect normal/bright          Signed Electronically by: Miroslava JIMENEZ  MD Jeanine

## 2025-04-09 ENCOUNTER — PREP FOR PROCEDURE (OUTPATIENT)
Dept: SURGERY | Facility: OTHER | Age: 65
End: 2025-04-09

## 2025-04-09 ENCOUNTER — OFFICE VISIT (OUTPATIENT)
Dept: SURGERY | Facility: OTHER | Age: 65
End: 2025-04-09
Attending: STUDENT IN AN ORGANIZED HEALTH CARE EDUCATION/TRAINING PROGRAM
Payer: COMMERCIAL

## 2025-04-09 VITALS
OXYGEN SATURATION: 98 % | HEART RATE: 104 BPM | SYSTOLIC BLOOD PRESSURE: 138 MMHG | TEMPERATURE: 97.4 F | RESPIRATION RATE: 16 BRPM | DIASTOLIC BLOOD PRESSURE: 74 MMHG

## 2025-04-09 DIAGNOSIS — R92.8 ABNORMAL MAMMOGRAM: ICD-10-CM

## 2025-04-09 DIAGNOSIS — R93.3 ABNORMAL CT SCAN, COLON: ICD-10-CM

## 2025-04-09 DIAGNOSIS — Z12.31 ENCOUNTER FOR SCREENING MAMMOGRAM FOR MALIGNANT NEOPLASM OF BREAST: ICD-10-CM

## 2025-04-09 DIAGNOSIS — K63.9 COLON WALL THICKENING: Primary | ICD-10-CM

## 2025-04-09 DIAGNOSIS — R19.5 CHANGE IN STOOL CALIBER: Primary | ICD-10-CM

## 2025-04-09 PROCEDURE — 1125F AMNT PAIN NOTED PAIN PRSNT: CPT | Performed by: SURGERY

## 2025-04-09 PROCEDURE — 3078F DIAST BP <80 MM HG: CPT | Performed by: SURGERY

## 2025-04-09 PROCEDURE — 3075F SYST BP GE 130 - 139MM HG: CPT | Performed by: SURGERY

## 2025-04-09 PROCEDURE — 99213 OFFICE O/P EST LOW 20 MIN: CPT | Performed by: SURGERY

## 2025-04-09 ASSESSMENT — PAIN SCALES - GENERAL: PAINLEVEL_OUTOF10: MILD PAIN (2)

## 2025-04-14 ENCOUNTER — HOSPITAL ENCOUNTER (OUTPATIENT)
Dept: MAMMOGRAPHY | Facility: OTHER | Age: 65
Discharge: HOME | End: 2025-04-14
Attending: SURGERY | Admitting: SURGERY
Payer: COMMERCIAL

## 2025-04-14 DIAGNOSIS — Z12.31 ENCOUNTER FOR SCREENING MAMMOGRAM FOR MALIGNANT NEOPLASM OF BREAST: ICD-10-CM

## 2025-04-14 DIAGNOSIS — R92.8 ABNORMAL MAMMOGRAM: ICD-10-CM

## 2025-04-14 PROCEDURE — G0279 TOMOSYNTHESIS, MAMMO: HCPCS | Mod: 26 | Performed by: RADIOLOGY

## 2025-04-14 PROCEDURE — 77065 DX MAMMO INCL CAD UNI: CPT | Mod: 26 | Performed by: RADIOLOGY

## 2025-04-14 PROCEDURE — 77065 DX MAMMO INCL CAD UNI: CPT | Mod: TC,LT

## 2025-04-14 NOTE — PROGRESS NOTES
[facility] Consultation    CC:  Change in stool caliber     HPI:  This 64 year old year old female is seen at the request of Dr. Solorzano for evaluation of change in stool habits. She has noted over the last six months having thin stools, denies blood, will have left flank pain occasionally. She recently had CT scan with concerns for rectal wall thickening. She has not had colonoscopy. Denies family history of colon cancer, has personal history of mastectomy though not for cancer per report. She continues to be interested in completion mastectomy on the left side.      Past Medical History:   Diagnosis Date    Diabetes (H)     Idiopathic chronic gout of foot without tophus, unspecified laterality 12/24/2019    Morbid obesity (H) 12/24/2019    Type 2 diabetes mellitus without complication, without long-term current use of insulin (H) 12/24/2019       Past Surgical History:   Procedure Laterality Date    BREAST BIOPSY, CORE RT/LT Right 2011    DILATION AND CURETTAGE  1987    missed ab    DILATION AND CURETTAGE  1997    missed ab    MAMMOGRAM - HIM SCAN  2011    MASTECTOMY Right 2011    due to infected biospy site. No cancer    PAP DIAGNOSTIC SMEAR  1990     BREAST BIOPSY VAC ASSISTED LT Left 10/22/2024       No Known Allergies    Current Outpatient Medications   Medication Sig Dispense Refill    ibuprofen (ADVIL/MOTRIN) 200 MG tablet Take 200 mg by mouth every 4 hours as needed for mild pain.       No current facility-administered medications for this visit.       HABITS:    Social History     Tobacco Use    Smoking status: Never     Passive exposure: Past (As a child)    Smokeless tobacco: Never   Vaping Use    Vaping status: Never Used   Substance Use Topics    Alcohol use: Never    Drug use: Never       Family History   Problem Relation Age of Onset    Obesity Mother     Coronary Artery Disease Father     Depression Father     Obesity Father     Abdominal Aortic Aneurysm Father 73        cause of death        REVIEW OF SYSTEMS:  Ten point review of systems negative except those mentioned in the HPI.     OBJECTIVE:    /74   Pulse 104   Temp 97.4  F (36.3  C)   Resp 16   SpO2 98%     GENERAL: Generally appears well, in no distress with appropriate affect.  HEENT:   Sclerae anicteric - normocephalic atraumatic   Respiratory:  No acute distress, no splinting,   Cardiovascular:  Regular Rate and Rhythm  Abdomen: non-distended   :  deferred  Extremities:  Extremities normal. No deformities, edema, or skin discoloration.  Skin:  no suspicious lesions or rashes  Neurological: grossly intact  Psych:  Alert, oriented, affect appropriate with normal decision making ability.    IMPRESSION:    Will plan on colonoscopy for rectal wall thickening and change in stool caliber.     Of note will have her get her follow up mammogram from our last meeting as well as a new plastics referral as unsure how to code the completion mastectomy in terms of medical necessity, as would be more likely to be for mastalgia/reduction.     PLAN:    See above     James Robb MD,     4/14/2025  10:24 AM

## 2025-04-25 ENCOUNTER — HOSPITAL ENCOUNTER (OUTPATIENT)
Facility: HOSPITAL | Age: 65
Discharge: HOME | End: 2025-04-25
Attending: SURGERY | Admitting: SURGERY
Payer: COMMERCIAL

## 2025-04-25 VITALS
HEIGHT: 60 IN | HEART RATE: 66 BPM | DIASTOLIC BLOOD PRESSURE: 80 MMHG | WEIGHT: 214 LBS | RESPIRATION RATE: 16 BRPM | SYSTOLIC BLOOD PRESSURE: 131 MMHG | OXYGEN SATURATION: 97 % | TEMPERATURE: 96.8 F | BODY MASS INDEX: 42.01 KG/M2

## 2025-04-25 PROCEDURE — 45385 COLONOSCOPY W/LESION REMOVAL: CPT | Performed by: SURGERY

## 2025-04-25 PROCEDURE — 88305 TISSUE EXAM BY PATHOLOGIST: CPT | Mod: TC | Performed by: SURGERY

## 2025-04-25 PROCEDURE — 88305 TISSUE EXAM BY PATHOLOGIST: CPT | Mod: 26 | Performed by: PATHOLOGY

## 2025-04-25 PROCEDURE — 999N000141 HC STATISTIC PRE-PROCEDURE NURSING ASSESSMENT: Performed by: SURGERY

## 2025-04-25 PROCEDURE — 272N000001 HC OR GENERAL SUPPLY STERILE: Performed by: SURGERY

## 2025-04-25 PROCEDURE — 370N000017 HC ANESTHESIA TECHNICAL FEE, PER MIN: Performed by: SURGERY

## 2025-04-25 PROCEDURE — 360N000075 HC SURGERY LEVEL 2, PER MIN: Performed by: SURGERY

## 2025-04-25 PROCEDURE — 710N000012 HC RECOVERY PHASE 2, PER MINUTE: Performed by: SURGERY

## 2025-04-25 RX ORDER — DEXAMETHASONE SODIUM PHOSPHATE 10 MG/ML
4 INJECTION, SOLUTION INTRAMUSCULAR; INTRAVENOUS
Status: DISCONTINUED | OUTPATIENT
Start: 2025-04-25 | End: 2025-04-25 | Stop reason: HOSPADM

## 2025-04-25 RX ORDER — LABETALOL HYDROCHLORIDE 5 MG/ML
10 INJECTION, SOLUTION INTRAVENOUS
Status: DISCONTINUED | OUTPATIENT
Start: 2025-04-25 | End: 2025-04-25 | Stop reason: HOSPADM

## 2025-04-25 RX ORDER — ONDANSETRON 4 MG/1
4 TABLET, ORALLY DISINTEGRATING ORAL EVERY 30 MIN PRN
Status: DISCONTINUED | OUTPATIENT
Start: 2025-04-25 | End: 2025-04-25 | Stop reason: HOSPADM

## 2025-04-25 RX ORDER — ONDANSETRON 2 MG/ML
4 INJECTION INTRAMUSCULAR; INTRAVENOUS EVERY 30 MIN PRN
Status: DISCONTINUED | OUTPATIENT
Start: 2025-04-25 | End: 2025-04-25 | Stop reason: HOSPADM

## 2025-04-25 RX ORDER — NALOXONE HYDROCHLORIDE 0.4 MG/ML
0.1 INJECTION, SOLUTION INTRAMUSCULAR; INTRAVENOUS; SUBCUTANEOUS
Status: DISCONTINUED | OUTPATIENT
Start: 2025-04-25 | End: 2025-04-25 | Stop reason: HOSPADM

## 2025-04-25 RX ORDER — FENTANYL CITRATE 50 UG/ML
50 INJECTION, SOLUTION INTRAMUSCULAR; INTRAVENOUS EVERY 5 MIN PRN
Status: DISCONTINUED | OUTPATIENT
Start: 2025-04-25 | End: 2025-04-25 | Stop reason: HOSPADM

## 2025-04-25 RX ORDER — FLUMAZENIL 0.1 MG/ML
0.2 INJECTION, SOLUTION INTRAVENOUS
Status: CANCELLED | OUTPATIENT
Start: 2025-04-25 | End: 2025-04-25

## 2025-04-25 RX ORDER — SODIUM CHLORIDE, SODIUM LACTATE, POTASSIUM CHLORIDE, CALCIUM CHLORIDE 600; 310; 30; 20 MG/100ML; MG/100ML; MG/100ML; MG/100ML
INJECTION, SOLUTION INTRAVENOUS CONTINUOUS
Status: DISCONTINUED | OUTPATIENT
Start: 2025-04-25 | End: 2025-04-25 | Stop reason: HOSPADM

## 2025-04-25 RX ORDER — HYDRALAZINE HYDROCHLORIDE 20 MG/ML
2.5-5 INJECTION INTRAMUSCULAR; INTRAVENOUS EVERY 10 MIN PRN
Status: DISCONTINUED | OUTPATIENT
Start: 2025-04-25 | End: 2025-04-25 | Stop reason: HOSPADM

## 2025-04-25 RX ORDER — LIDOCAINE 40 MG/G
CREAM TOPICAL
Status: DISCONTINUED | OUTPATIENT
Start: 2025-04-25 | End: 2025-04-25 | Stop reason: HOSPADM

## 2025-04-25 ASSESSMENT — ACTIVITIES OF DAILY LIVING (ADL)
ADLS_ACUITY_SCORE: 41

## 2025-04-25 NOTE — OR NURSING
Patient and responsible adult given discharge instructions with no questions regarding instructions. Trip score 20/20. Pain level 0/10.  Discharged from unit via ambulatory. Patient discharged to home.

## 2025-04-25 NOTE — OR NURSING
Expelled enema. Yellowish brown fluid with moderate amount gritty stool in bottom of toilet. Isabel GONZALEZ notified and assessed stool. Okay to proceed.

## 2025-04-25 NOTE — OP NOTE
Mayte Parmar MRN# [id number]   YOB: 1960 Age: 64 year old      Date of Admission:  4/25/2025  Date of Service:   4/25/25    Primary care provider: Miroslava Solorzano    PREOPERATIVE DIAGNOSIS:  Rectal thickening        POSTOPERATIVE DIAGNOSIS:  Melanosis coli, Colon polyps x 3, diverticulosis.          PROCEDURE:  Colonoscopy with polypectomy            INDICATIONS:  See clinic notes       Specimen:   ID Type Source Tests Collected by Time Destination   1 : x2 Polyp Large Intestine, Colon, Cecum SURGICAL PATHOLOGY EXAM James Robb MD 4/25/2025 11:14 AM    2 :  Polyp Large Intestine, Colon, Sigmoid SURGICAL PATHOLOGY EXAM James Robb MD 4/25/2025 11:21 AM        SURGEON: James Robb MD    DESCRIPTION OF PROCEDURE: Mayte Parmar was brought into the endoscopy suite and placed in the left lateral decubitus position. After preprocedural pause and attended monitored anesthesia was administered, the external anus was inspected and was normal. Digital rectal exam was normal. The colonoscope was inserted and advanced under direct visualization to the level of the cecum which was identified by the appendiceal orifice and the ileocecal valve. The prep was poor with lots of sediment. There was pigmentation changes noted consistent with melanosis coli. Upon slow withdrawal of the colonoscope, approximately 85% of the mucosa was directly visualized. There were two polyps in the cecum removed with cold snare. Difficult to retrieve due sediment. There was mild sigmoid diverticulosis. There was one polyp in sigmoid removed with cold snare, did place a clip on base as was near diverticulosis.   The rest of the colon was without mucosal abnormality. There was no evidence of further polyps, inflammation, bleeding or AVMs. Retroflexion of the rectum was noted to have some benign appearing skin tags.. The extra air was removed from the colon, and the colonoscope withdrawn. The patient tolerated the procedure  well and was taken to postanesthesia care unit.     We invite the patient to return in 3 years for follow up screening evaluation, due to polyps and poor prep.    James Robb MD

## 2025-04-25 NOTE — H&P
Surgery Consult Clinic Note      RE: Mayte Parmar  : 1960        Chief Complaint:  Change in stool caliber      History of Present Illness:  I am seeing Mayte Parmar at the request of Dr. Solorzano for evaluation regarding meet and greet screening colonoscopy.  She saw Dr. Robb in clinic on 2025, please see that note for further details.  She reports a change in stool caliber over the last six months.  Recent CT scan with rectal wall thickening.  This is her first colonoscopy.   She denies family history of colon or rectal cancer, blood in stool,  weight loss, abdominal pain.  No previous abdominal surgeries.   She has no questions regarding  bowel prep.  Reports passing cloudy liquid stools today.     She specifically denies fevers, chills, nausea, vomiting, chest pain, shortness of breath, palpitations, sore throat, cough, or generalized feeling ill.       Medical history:  Past Medical History:   Diagnosis Date    Diabetes (H)     Idiopathic chronic gout of foot without tophus, unspecified laterality 2019    Morbid obesity (H) 2019    Type 2 diabetes mellitus without complication, without long-term current use of insulin (H) 2019       Surgical history:  Past Surgical History:   Procedure Laterality Date    BREAST BIOPSY, CORE RT/LT Right     DILATION AND CURETTAGE      missed ab    DILATION AND CURETTAGE      missed ab    MAMMOGRAM - HIM SCAN      MASTECTOMY Right     due to infected biospy site. No cancer    PAP DIAGNOSTIC SMEAR  1990 BREAST BIOPSY VAC ASSISTED LT Left 10/22/2024       Family history:  Family History   Problem Relation Age of Onset    Obesity Mother     Coronary Artery Disease Father     Depression Father     Obesity Father     Abdominal Aortic Aneurysm Father 73        cause of death       Medications:  Prior to Admission medications    Medication Sig Start Date End Date Taking? Authorizing Provider   ibuprofen (ADVIL/MOTRIN) 200 MG  tablet Take 200 mg by mouth every 4 hours as needed for mild pain.    Reported, Patient       Allergies:  The patient has No Known Allergies.  .  Social history:  Social History     Tobacco Use    Smoking status: Never     Passive exposure: Past (As a child)    Smokeless tobacco: Never   Substance Use Topics    Alcohol use: Never     Marital status: .    Review of Systems:    Constitutional: Negative for fever, chills.   HENT: Negative for ear pain, congestion, sore throat, and ear discharge.    Eyes: Negative for blurred vision, double vision.   Respiratory: Negative for cough, hemoptysis, shortness of breath, wheezing and stridor.    Cardiovascular: Negative for chest pain, palpitations and orthopnea.   Gastrointestinal: Negative for heartburn, nausea, vomiting, abdominal pain and blood in stool.   Genitourinary: Negative for urgency, frequency   Musculoskeletal: Negative for myalgias, back pain and joint pain.   Neurological: Negative for tingling, speech change and headaches.   Endo/Heme/Allergies: Does not bruise/bleed easily.   Psychiatric/Behavioral: Negative for depression, suicidal ideas and hallucinations. The patient is not nervous/anxious.    Physical Examination:  BP (!) 143/78   Pulse 88   Temp 98.5  F (36.9  C) (Tympanic)   Resp 16   Ht 1.524 m (5')   Wt 97.1 kg (214 lb)   SpO2 98%   BMI 41.79 kg/m    General: Alert and orientedx4, no acute distress, well-developed/well-nourished, ambulating without assistance  HEENT: normocephalic atraumatic, extraocular movements intact, sclerae anicteric, Trachea midline  Chest:   Clear to auscultation bilaterally.  Cardiac: S1S2 , regular rate and rhythm without additional sounds  Abdomen: Soft, non-tender, non-distended  Extremities: Cursory exam unremarkable.  No peripheral edema noted.  Skin: Warm, dry, less than 2 sec cap refill  Neuro: CN 2-12 grossly intact, no focal deficit, GCS 15  Psych: Pleasant, calm, asks appropriate  questions      Assessment/Plan:  #1 Colonoscopy  #2 Change in stool caliber  #3 Rectal thickening noted on CT    Mayte Parmar and I had a discussion about colonoscopies.  The indications, risks, benefits, althernatives and technical aspects of whole colon colonoscopy were outlined with risks including, but not limited to, perforation, bleeding and inability to visualize entire colon.  Management of each was reviewed including the risk for life saving surgery and possible admittance to the hospital.  The need of mechanical preparation of the colon was reviewed along with the use of monitored anesthetic care.  Her questions were asked and answered.  We will proceed with colonoscopy with Dr. Robb as scheduled.  Kym Torres [facility]  [address] [place], [place] [id number]    Referring Provider:  No referring provider defined for this encounter.     Primary Care Provider:  Miroslava Solorzano

## 2025-04-25 NOTE — DISCHARGE INSTRUCTIONS
You had three colon polyps removed today.  Dr. Robb's office will contact you with the pathology results when they become available.    Anesthesia (Sleep Medicine)  What should I do after anesthesia?  You should rest and relax for the next 24 hours. Avoid risky or difficult (strenuous) activity. A responsible adult should stay with you overnight.  Don't drive or use any heavy equipment for 24 hours. Even if you feel normal, your reactions may be affected by the sleep medicine given to you.  Don't drink alcohol or make any important decisions for 24 hours.  Slowly get back to your regular diet, as you feel able.  How should I expect to feel?  It's normal to feel dizzy, light-headed, or faint for up to a full day after anesthesia or while taking pain medicine. If this happens:   Sit down for a few minutes before standing.  Have someone help you when you get up to walk or use the bathroom.  If you have nausea (feel sick to your stomach) or vomit (throw up):   Drink clear liquids (such as apple juice, ginger ale, broth, or 7UP) until you feel better.  If you feel sick to your stomach, or you keep vomiting for 24 hours, please call the doctor.  What else should I know?  You might have a dry mouth, sore throat, muscle aches, or trouble sleeping. These should go away after 24 hours.  Please contact your doctor if you have any other symptoms that concern you, such as fever, pain, bleeding, fluid drainage, swelling, or headache, or if it's been over 8 to 10 hours and you still aren't able to pee (urinate).  If you have a history of sleep apnea, it's very important to use your CPAP machine for the next 24 hours when you nap or sleep.   For informational purposes only. Not to replace the advice of your health care provider. Copyright   2023 [employer]. All rights reserved. Clinically reviewed by Tony Heard MD. [employer] [id number] - REV 09/23.    Thank you for allowing [facility] to care for you today.   If you have any questions and/or concerns please reach out to us Monday-Friday 0800-4:00 PM at [phone].  After hours please go to the Urgent Care and/or Emergency Room.  If you feel like it is an emergency call 911.

## 2025-04-30 ENCOUNTER — TRANSFERRED RECORDS (OUTPATIENT)
Dept: MULTI SPECIALTY CLINIC | Facility: CLINIC | Age: 65
End: 2025-04-30

## 2025-04-30 LAB
PATH REPORT.COMMENTS IMP SPEC: NORMAL
PATH REPORT.FINAL DX SPEC: NORMAL
PATH REPORT.GROSS SPEC: NORMAL
PATH REPORT.MICROSCOPIC SPEC OTHER STN: NORMAL
PATH REPORT.RELEVANT HX SPEC: NORMAL
PHOTO IMAGE: NORMAL
RETINOPATHY: NORMAL

## 2025-05-09 ENCOUNTER — TELEPHONE (OUTPATIENT)
Dept: FAMILY MEDICINE | Facility: OTHER | Age: 65
End: 2025-05-09

## 2025-05-09 NOTE — TELEPHONE ENCOUNTER
OK to see next week for pre-op.  OK to double book her since she is well known to me.    Miroslava

## 2025-05-09 NOTE — TELEPHONE ENCOUNTER
1:18 PM    Reason for Call: OVERBOOK    Patient preop / [facility] / DOS 5-23 / L breast reduction / Dr. Salome Pineda (nurse called on this from Dr. Pineda office, did not speak to pt)    The patient is requesting an appointment for prior to surgery with Dr. Solorzano.    Was an appointment offered for this call? No  If yes : Appointment type              Date    Preferred method for responding to this message: Telephone Call  What is your phone number ? [phone] verified with Nurse that called    If we cannot reach you directly, may we leave a detailed response at the number you provided? Nurse called did not speak to pt    Can this message wait until your PCP/provider returns, if unavailable today? Tiffanie Virgen

## 2025-05-14 ENCOUNTER — TELEPHONE (OUTPATIENT)
Dept: FAMILY MEDICINE | Facility: OTHER | Age: 65
End: 2025-05-14

## 2025-05-14 ENCOUNTER — OFFICE VISIT (OUTPATIENT)
Dept: FAMILY MEDICINE | Facility: OTHER | Age: 65
End: 2025-05-14
Attending: STUDENT IN AN ORGANIZED HEALTH CARE EDUCATION/TRAINING PROGRAM
Payer: COMMERCIAL

## 2025-05-14 ENCOUNTER — RESULTS FOLLOW-UP (OUTPATIENT)
Dept: FAMILY MEDICINE | Facility: OTHER | Age: 65
End: 2025-05-14

## 2025-05-14 VITALS
OXYGEN SATURATION: 97 % | DIASTOLIC BLOOD PRESSURE: 82 MMHG | HEART RATE: 65 BPM | SYSTOLIC BLOOD PRESSURE: 132 MMHG | BODY MASS INDEX: 42.43 KG/M2 | TEMPERATURE: 96.8 F | HEIGHT: 60 IN | RESPIRATION RATE: 16 BRPM | WEIGHT: 216.1 LBS

## 2025-05-14 DIAGNOSIS — E66.01 MORBID OBESITY (H): ICD-10-CM

## 2025-05-14 DIAGNOSIS — E11.9 TYPE 2 DIABETES MELLITUS WITHOUT COMPLICATION, WITHOUT LONG-TERM CURRENT USE OF INSULIN (H): ICD-10-CM

## 2025-05-14 DIAGNOSIS — R73.01 IMPAIRED FASTING GLUCOSE: ICD-10-CM

## 2025-05-14 DIAGNOSIS — M1A.0790 IDIOPATHIC CHRONIC GOUT OF FOOT WITHOUT TOPHUS, UNSPECIFIED LATERALITY: ICD-10-CM

## 2025-05-14 DIAGNOSIS — Z01.818 PRE-OP EXAM: Primary | ICD-10-CM

## 2025-05-14 DIAGNOSIS — N64.4 PAIN OF LEFT BREAST: ICD-10-CM

## 2025-05-14 DIAGNOSIS — I51.89 DIASTOLIC DYSFUNCTION: ICD-10-CM

## 2025-05-14 LAB
ANION GAP SERPL CALCULATED.3IONS-SCNC: 13 MMOL/L (ref 7–15)
ATRIAL RATE - MUSE: 65 BPM
BASOPHILS # BLD AUTO: 0 10E3/UL (ref 0–0.2)
BASOPHILS NFR BLD AUTO: 0 %
BUN SERPL-MCNC: 14.6 MG/DL (ref 8–23)
CALCIUM SERPL-MCNC: 9.7 MG/DL (ref 8.8–10.4)
CHLORIDE SERPL-SCNC: 99 MMOL/L (ref 98–107)
CREAT SERPL-MCNC: 0.69 MG/DL (ref 0.51–0.95)
DIASTOLIC BLOOD PRESSURE - MUSE: NORMAL MMHG
EGFRCR SERPLBLD CKD-EPI 2021: >90 ML/MIN/1.73M2
EOSINOPHIL # BLD AUTO: 0.1 10E3/UL (ref 0–0.7)
EOSINOPHIL NFR BLD AUTO: 1 %
ERYTHROCYTE [DISTWIDTH] IN BLOOD BY AUTOMATED COUNT: 13.8 % (ref 10–15)
EST. AVERAGE GLUCOSE BLD GHB EST-MCNC: 103 MG/DL
GLUCOSE SERPL-MCNC: 98 MG/DL (ref 70–99)
HBA1C MFR BLD: 5.2 %
HCO3 SERPL-SCNC: 24 MMOL/L (ref 22–29)
HCT VFR BLD AUTO: 43.4 % (ref 35–47)
HGB BLD-MCNC: 14.3 G/DL (ref 11.7–15.7)
IMM GRANULOCYTES # BLD: 0 10E3/UL
IMM GRANULOCYTES NFR BLD: 0 %
INTERPRETATION ECG - MUSE: NORMAL
LYMPHOCYTES # BLD AUTO: 1.8 10E3/UL (ref 0.8–5.3)
LYMPHOCYTES NFR BLD AUTO: 27 %
MCH RBC QN AUTO: 30.2 PG (ref 26.5–33)
MCHC RBC AUTO-ENTMCNC: 32.9 G/DL (ref 31.5–36.5)
MCV RBC AUTO: 92 FL (ref 78–100)
MONOCYTES # BLD AUTO: 0.5 10E3/UL (ref 0–1.3)
MONOCYTES NFR BLD AUTO: 7 %
NEUTROPHILS # BLD AUTO: 4.4 10E3/UL (ref 1.6–8.3)
NEUTROPHILS NFR BLD AUTO: 65 %
NRBC # BLD AUTO: 0 10E3/UL
NRBC BLD AUTO-RTO: 0 /100
P AXIS - MUSE: 43 DEGREES
PLATELET # BLD AUTO: 221 10E3/UL (ref 150–450)
POTASSIUM SERPL-SCNC: 3.8 MMOL/L (ref 3.4–5.3)
PR INTERVAL - MUSE: 154 MS
QRS DURATION - MUSE: 154 MS
QT - MUSE: 466 MS
QTC - MUSE: 484 MS
R AXIS - MUSE: -44 DEGREES
RBC # BLD AUTO: 4.74 10E6/UL (ref 3.8–5.2)
SODIUM SERPL-SCNC: 136 MMOL/L (ref 135–145)
SYSTOLIC BLOOD PRESSURE - MUSE: NORMAL MMHG
T AXIS - MUSE: -13 DEGREES
VENTRICULAR RATE- MUSE: 65 BPM
WBC # BLD AUTO: 6.8 10E3/UL (ref 4–11)

## 2025-05-14 PROCEDURE — 85025 COMPLETE CBC W/AUTO DIFF WBC: CPT | Mod: ZL | Performed by: STUDENT IN AN ORGANIZED HEALTH CARE EDUCATION/TRAINING PROGRAM

## 2025-05-14 PROCEDURE — G0463 HOSPITAL OUTPT CLINIC VISIT: HCPCS

## 2025-05-14 PROCEDURE — 82565 ASSAY OF CREATININE: CPT | Mod: ZL | Performed by: STUDENT IN AN ORGANIZED HEALTH CARE EDUCATION/TRAINING PROGRAM

## 2025-05-14 PROCEDURE — 36415 COLL VENOUS BLD VENIPUNCTURE: CPT | Mod: ZL | Performed by: STUDENT IN AN ORGANIZED HEALTH CARE EDUCATION/TRAINING PROGRAM

## 2025-05-14 PROCEDURE — 83036 HEMOGLOBIN GLYCOSYLATED A1C: CPT | Mod: ZL | Performed by: STUDENT IN AN ORGANIZED HEALTH CARE EDUCATION/TRAINING PROGRAM

## 2025-05-14 NOTE — PROGRESS NOTES
Preoperative Evaluation  [facility] [place]  [address]  [place] [address]  Phone: [phone]  Primary Provider: Miroslava Solorzano MD  Pre-op Performing Provider: Miroslava Solorzano MD  May 14, 2025         5/14/2025   Surgical Information   What procedure is being done? L Breast Reduction    Facility or Hospital where procedure/surgery will be performed: [facility]    Who is doing the procedure / surgery?     Date of surgery / procedure: 5/23    Time of surgery / procedure: TBD    Where do you plan to recover after surgery? at home with family        Proxy-reported     Fax number for surgical facility: Will be available upon request    Assessment & Plan     The proposed surgical procedure is considered INTERMEDIATE risk.    Pre-op exam  Mayte presents to clinic for a pre-op   She will be undergoing elective breast augmentation surgery  We will obtain labs and ECG  - Hemoglobin A1c; Future  - CBC with platelets and differential; Future  - Basic metabolic panel; Future  - EKG 12-lead complete w/read - (Clinic Performed)    Pain of left breast  Indication for breast augmentation    Morbid obesity (H)  BMI of 42.  Denies JASPAL.  At risk for obesity hypoventilation syndrome  No difficulty with anesthesia in the past    Type 2 diabetes mellitus without complication, without long-term current use of insulin (H)  Diet controlled.  HA1c has been very good historically    Idiopathic chronic gout of foot without tophus, unspecified laterality  Asymptomatic, not on prophylaxis    Impaired fasting glucose  Diet controlled.  HA1c has been very good historically  - Hemoglobin A1c; Future    Diastolic dysfunction  Echo from 6/21/2204 shows grade 1 diastolic dysfunction  Patient is asymptomatic         - No identified additional risk factors other than previously addressed    Antiplatelet or Anticoagulation Medication Instructions   - We reviewed the medication list and the patient is not on an antiplatelet  or anticoagulation medications.    Additional Medication Instructions  We reviewed the medication list and there are no chronic medications that need to be adjusted for this procedure.    Recommendation  Approval given to proceed with proposed procedure, without further diagnostic evaluation.      Arelis Hu is a 64 year old, presenting for the following:  Pre-Op Exam          5/14/2025     9:48 AM   Additional Questions   Roomed by vanessa HINKLE: Patient presents to clinic for pre-op evaluation in anticipation of left breast reduction.  She has been having left breast soreness and wants the left breast to be reduced- has been having breast hanging very low. When she lost weight her breast was affected and is hanging to her waist.  For more comfort, desires reduction which I think is very reasonable        5/14/2025   Pre-Op Questionnaire   Have you ever had a heart attack or stroke? No    Have you ever had surgery on your heart or blood vessels, such as a stent placement, a coronary artery bypass, or surgery on an artery in your head, neck, heart, or legs? No    Do you have chest pain with activity? No    Do you have a history of heart failure? (!) YES mild    Do you currently have a cold, bronchitis or symptoms of other infection? No    Do you have a cough, shortness of breath, or wheezing? No    Do you or anyone in your family have previous history of blood clots? No    Do you or does anyone in your family have a serious bleeding problem such as prolonged bleeding following surgeries or cuts? No    Have you ever had problems with anemia or been told to take iron pills? No    Have you had any abnormal blood loss such as black, tarry or bloody stools, or abnormal vaginal bleeding? No    Have you ever had a blood transfusion? No    Are you willing to have a blood transfusion if it is medically needed before, during, or after your surgery? Yes    Have you or any of your relatives ever had problems with  anesthesia? No    Do you have sleep apnea, excessive snoring or daytime drowsiness? No    Do you have any artifical heart valves or other implanted medical devices like a pacemaker, defibrillator, or continuous glucose monitor? No    Do you have artificial joints? No    Are you allergic to latex? No        Proxy-reported     Advance Care Planning    Discussed advance care planning with patient; however, patient declined at this time.    Preoperative Review of    reviewed - no record of controlled substances prescribed.      Status of Chronic Conditions:  See problem list for active medical problems.  Problems all longstanding and stable, except as noted/documented.  See ROS for pertinent symptoms related to these conditions.    Patient Active Problem List    Diagnosis Date Noted    Morbid obesity (H) 12/24/2019     Priority: Medium    Type 2 diabetes mellitus without complication, without long-term current use of insulin (H) 12/24/2019     Priority: Medium    Idiopathic chronic gout of foot without tophus, unspecified laterality 12/24/2019     Priority: Medium      Past Medical History:   Diagnosis Date    Diabetes (H)     Idiopathic chronic gout of foot without tophus, unspecified laterality 12/24/2019    Morbid obesity (H) 12/24/2019    Type 2 diabetes mellitus without complication, without long-term current use of insulin (H) 12/24/2019     Past Surgical History:   Procedure Laterality Date    BREAST BIOPSY, CORE RT/LT Right 2011    COLONOSCOPY N/A 4/25/2025    Procedure: COLONOSCOPY with polypectomy, resolution clip;  Surgeon: James Robb MD;  Location: [facility]    DILATION AND CURETTAGE  1987    missed ab    DILATION AND CURETTAGE  1997    missed ab    MAMMOGRAM - HIM SCAN  2011    MASTECTOMY Right 2011    due to infected biospy site. No cancer    PAP DIAGNOSTIC SMEAR  1990 US BREAST BIOPSY VAC ASSISTED LT Left 10/22/2024     Current Outpatient Medications   Medication Sig Dispense Refill     ibuprofen (ADVIL/MOTRIN) 200 MG tablet Take 200 mg by mouth every 4 hours as needed for mild pain.         No Known Allergies     Social History     Tobacco Use    Smoking status: Never     Passive exposure: Past (As a child)    Smokeless tobacco: Never   Substance Use Topics    Alcohol use: Never     Family History   Problem Relation Age of Onset    Obesity Mother     Coronary Artery Disease Father     Depression Father     Obesity Father     Abdominal Aortic Aneurysm Father 73        cause of death     History   Drug Use Unknown             Review of Systems  Constitutional, HEENT, cardiovascular, pulmonary, GI, , musculoskeletal, neuro, skin, endocrine and psych systems are negative, except as otherwise noted.    Objective    /82 (BP Location: Left arm, Patient Position: Sitting, Cuff Size: Adult Large)   Pulse 65   Temp 96.8  F (36  C) (Tympanic)   Resp 16   Ht 1.524 m (5')   Wt 98 kg (216 lb 1.6 oz)   SpO2 97%   BMI 42.20 kg/m     Estimated body mass index is 42.2 kg/m  as calculated from the following:    Height as of this encounter: 1.524 m (5').    Weight as of this encounter: 98 kg (216 lb 1.6 oz).  Physical Exam  GENERAL: alert and no distress  EYES: Eyes grossly normal to inspection, PERRL and conjunctivae and sclerae normal  HENT: ear canals and TM's normal, nose and mouth without ulcers or lesions  NECK: no adenopathy, no asymmetry, masses, or scars  RESP: lungs clear to auscultation - no rales, rhonchi or wheezes  CV: regular rate and rhythm, normal S1 S2, no S3 or S4, no murmur, click or rub, no peripheral edema  ABDOMEN: soft, nontender, no hepatosplenomegaly, no masses and bowel sounds normal  MS: no gross musculoskeletal defects noted, no edema  SKIN: no suspicious lesions or rashes  NEURO: Normal strength and tone, mentation intact and speech normal  PSYCH: mentation appears normal, affect normal/bright    Recent Labs   Lab Test 06/21/24  0850   HGB 15.3          POTASSIUM 4.1   CR 0.77   A1C 5.4        Diagnostics  Labs pending at this time.  Results will be reviewed when available.   EKG: appears normal, NSR, normal axis, normal intervals, no acute ST/T changes c/w ischemia, no LVH by voltage criteria, unchanged from previous tracings  RBBB unchanged from previous.  No clinical concerns    Revised Cardiac Risk Index (RCRI)  The patient has the following serious cardiovascular risks for perioperative complications:   - No serious cardiac risks = 0 points     RCRI Interpretation: 0 points: Class I (very low risk - 0.4% complication rate)         Signed Electronically by: Miroslava Solorzano MD  A copy of this evaluation report is provided to the requesting physician.

## 2025-05-19 LAB
ATRIAL RATE - MUSE: 65 BPM
DIASTOLIC BLOOD PRESSURE - MUSE: NORMAL MMHG
INTERPRETATION ECG - MUSE: NORMAL
P AXIS - MUSE: 43 DEGREES
PR INTERVAL - MUSE: 154 MS
QRS DURATION - MUSE: 154 MS
QT - MUSE: 466 MS
QTC - MUSE: 484 MS
R AXIS - MUSE: -44 DEGREES
SYSTOLIC BLOOD PRESSURE - MUSE: NORMAL MMHG
T AXIS - MUSE: -13 DEGREES
VENTRICULAR RATE- MUSE: 65 BPM

## 2025-05-23 ENCOUNTER — APPOINTMENT (OUTPATIENT)
Dept: CT IMAGING | Facility: HOSPITAL | Age: 65
End: 2025-05-23
Attending: EMERGENCY MEDICINE
Payer: COMMERCIAL

## 2025-05-23 ENCOUNTER — APPOINTMENT (OUTPATIENT)
Dept: GENERAL RADIOLOGY | Facility: HOSPITAL | Age: 65
End: 2025-05-23
Attending: EMERGENCY MEDICINE
Payer: COMMERCIAL

## 2025-05-23 ENCOUNTER — HOSPITAL ENCOUNTER (EMERGENCY)
Facility: HOSPITAL | Age: 65
Discharge: HOME | End: 2025-05-24
Attending: EMERGENCY MEDICINE
Payer: COMMERCIAL

## 2025-05-23 DIAGNOSIS — E86.0 DEHYDRATION: ICD-10-CM
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
## 2025-05-23 DIAGNOSIS — S02.2XXA CLOSED FRACTURE OF NASAL BONE, INITIAL ENCOUNTER: ICD-10-CM

## 2025-05-23 LAB
ALBUMIN SERPL BCG-MCNC: 3.8 G/DL (ref 3.5–5.2)
ALP SERPL-CCNC: 62 U/L (ref 40–150)
ALT SERPL W P-5'-P-CCNC: 20 U/L (ref 0–50)
ANION GAP SERPL CALCULATED.3IONS-SCNC: 15 MMOL/L (ref 7–15)
AST SERPL W P-5'-P-CCNC: 23 U/L (ref 0–45)
BASE EXCESS BLDV CALC-SCNC: -2.3 MMOL/L (ref -3–3)
BASOPHILS # BLD AUTO: 0 10E3/UL (ref 0–0.2)
BASOPHILS NFR BLD AUTO: 0 %
BILIRUB SERPL-MCNC: 0.5 MG/DL
BUN SERPL-MCNC: 24.3 MG/DL (ref 8–23)
CALCIUM SERPL-MCNC: 9.4 MG/DL (ref 8.8–10.4)
CHLORIDE SERPL-SCNC: 101 MMOL/L (ref 98–107)
CREAT SERPL-MCNC: 0.85 MG/DL (ref 0.51–0.95)
EGFRCR SERPLBLD CKD-EPI 2021: 75 ML/MIN/1.73M2
EOSINOPHIL # BLD AUTO: 0 10E3/UL (ref 0–0.7)
EOSINOPHIL NFR BLD AUTO: 0 %
ERYTHROCYTE [DISTWIDTH] IN BLOOD BY AUTOMATED COUNT: 14.3 % (ref 10–15)
GLUCOSE SERPL-MCNC: 219 MG/DL (ref 70–99)
HCO3 BLDV-SCNC: 23 MMOL/L (ref 21–28)
HCO3 SERPL-SCNC: 20 MMOL/L (ref 22–29)
HCT VFR BLD AUTO: 37.9 % (ref 35–47)
HGB BLD-MCNC: 12.3 G/DL (ref 11.7–15.7)
IMM GRANULOCYTES # BLD: 0.1 10E3/UL
IMM GRANULOCYTES NFR BLD: 0 %
INR PPP: 0.99 (ref 0.85–1.15)
LYMPHOCYTES # BLD AUTO: 0.7 10E3/UL (ref 0.8–5.3)
LYMPHOCYTES NFR BLD AUTO: 4 %
MCH RBC QN AUTO: 30.3 PG (ref 26.5–33)
MCHC RBC AUTO-ENTMCNC: 32.5 G/DL (ref 31.5–36.5)
MCV RBC AUTO: 93 FL (ref 78–100)
MONOCYTES # BLD AUTO: 0.7 10E3/UL (ref 0–1.3)
MONOCYTES NFR BLD AUTO: 4 %
NEUTROPHILS # BLD AUTO: 14.8 10E3/UL (ref 1.6–8.3)
NEUTROPHILS NFR BLD AUTO: 91 %
NRBC # BLD AUTO: 0 10E3/UL
NRBC BLD AUTO-RTO: 0 /100
NT-PROBNP SERPL-MCNC: 142 PG/ML (ref 0–353)
O2/TOTAL GAS SETTING VFR VENT: 21 %
OXYHGB MFR BLDV: 13 % (ref 70–75)
PCO2 BLDV: 42 MM HG (ref 40–50)
PH BLDV: 7.35 [PH] (ref 7.32–7.43)
PLATELET # BLD AUTO: 269 10E3/UL (ref 150–450)
PO2 BLDV: 13 MM HG (ref 25–47)
POTASSIUM SERPL-SCNC: 4 MMOL/L (ref 3.4–5.3)
PROT SERPL-MCNC: 6.3 G/DL (ref 6.4–8.3)
PROTHROMBIN TIME: 13.3 SECONDS (ref 11.8–14.8)
RBC # BLD AUTO: 4.06 10E6/UL (ref 3.8–5.2)
SAO2 % BLDV: 13 % (ref 70–75)
SODIUM SERPL-SCNC: 136 MMOL/L (ref 135–145)
TROPONIN T SERPL HS-MCNC: 11 NG/L
WBC # BLD AUTO: 16.3 10E3/UL (ref 4–11)

## 2025-05-23 PROCEDURE — 93010 ELECTROCARDIOGRAM REPORT: CPT | Performed by: INTERNAL MEDICINE

## 2025-05-23 PROCEDURE — 84484 ASSAY OF TROPONIN QUANT: CPT | Performed by: EMERGENCY MEDICINE

## 2025-05-23 PROCEDURE — 258N000003 HC RX IP 258 OP 636: Performed by: EMERGENCY MEDICINE

## 2025-05-23 PROCEDURE — 36415 COLL VENOUS BLD VENIPUNCTURE: CPT | Performed by: EMERGENCY MEDICINE

## 2025-05-23 PROCEDURE — 72125 CT NECK SPINE W/O DYE: CPT | Mod: 26 | Performed by: RADIOLOGY

## 2025-05-23 PROCEDURE — 70450 CT HEAD/BRAIN W/O DYE: CPT | Mod: 26 | Performed by: RADIOLOGY

## 2025-05-23 PROCEDURE — 71045 X-RAY EXAM CHEST 1 VIEW: CPT | Mod: 26 | Performed by: RADIOLOGY

## 2025-05-23 PROCEDURE — 99285 EMERGENCY DEPT VISIT HI MDM: CPT | Mod: 25

## 2025-05-23 PROCEDURE — 85025 COMPLETE CBC W/AUTO DIFF WBC: CPT | Performed by: EMERGENCY MEDICINE

## 2025-05-23 PROCEDURE — 72125 CT NECK SPINE W/O DYE: CPT

## 2025-05-23 PROCEDURE — 83880 ASSAY OF NATRIURETIC PEPTIDE: CPT | Performed by: EMERGENCY MEDICINE

## 2025-05-23 PROCEDURE — 70450 CT HEAD/BRAIN W/O DYE: CPT

## 2025-05-23 PROCEDURE — 82805 BLOOD GASES W/O2 SATURATION: CPT | Performed by: EMERGENCY MEDICINE

## 2025-05-23 PROCEDURE — 71045 X-RAY EXAM CHEST 1 VIEW: CPT

## 2025-05-23 PROCEDURE — 93005 ELECTROCARDIOGRAM TRACING: CPT

## 2025-05-23 PROCEDURE — 85610 PROTHROMBIN TIME: CPT | Performed by: EMERGENCY MEDICINE

## 2025-05-23 PROCEDURE — 82565 ASSAY OF CREATININE: CPT | Performed by: EMERGENCY MEDICINE

## 2025-05-23 PROCEDURE — 96360 HYDRATION IV INFUSION INIT: CPT

## 2025-05-23 PROCEDURE — 99284 EMERGENCY DEPT VISIT MOD MDM: CPT | Performed by: EMERGENCY MEDICINE

## 2025-05-23 RX ADMIN — SODIUM CHLORIDE 1000 ML: 9 INJECTION, SOLUTION INTRAVENOUS at 22:54

## 2025-05-23 ASSESSMENT — ACTIVITIES OF DAILY LIVING (ADL)
ADLS_ACUITY_SCORE: 41
ADLS_ACUITY_SCORE: 41

## 2025-05-24 VITALS
WEIGHT: 216.49 LBS | DIASTOLIC BLOOD PRESSURE: 49 MMHG | RESPIRATION RATE: 18 BRPM | OXYGEN SATURATION: 99 % | SYSTOLIC BLOOD PRESSURE: 95 MMHG | BODY MASS INDEX: 42.5 KG/M2 | TEMPERATURE: 97.5 F | HEIGHT: 60 IN | HEART RATE: 79 BPM

## 2025-05-24 LAB
HOLD SPECIMEN: NORMAL
TROPONIN T SERPL HS-MCNC: 13 NG/L

## 2025-05-24 PROCEDURE — 96361 HYDRATE IV INFUSION ADD-ON: CPT

## 2025-05-24 ASSESSMENT — ENCOUNTER SYMPTOMS
CHILLS: 0
SHORTNESS OF BREATH: 0
COUGH: 0
FEVER: 0

## 2025-05-24 ASSESSMENT — ACTIVITIES OF DAILY LIVING (ADL)
ADLS_ACUITY_SCORE: 41
ADLS_ACUITY_SCORE: 41

## 2025-05-24 NOTE — ED PROVIDER NOTES
History     Chief Complaint   Patient presents with    Loss of Consciousness    Fall     HPI  Mayte Parmar is a 64 year old female who is here with facial trauma after nearly passing out.  Had a mastectomy today.  Reports having a meal but was fasting prior to this.  After standing up, became lightheaded and nearly passed out.  Primary complaint is fullness to the chest which is now resolved and nasal trauma.  No neck pain or pain anywhere else.    Allergies:  No Known Allergies    Problem List:    Patient Active Problem List    Diagnosis Date Noted    Morbid obesity (H) 12/24/2019     Priority: Medium    Type 2 diabetes mellitus without complication, without long-term current use of insulin (H) 12/24/2019     Priority: Medium    Idiopathic chronic gout of foot without tophus, unspecified laterality 12/24/2019     Priority: Medium        Past Medical History:    Past Medical History:   Diagnosis Date    Diabetes (H)     Idiopathic chronic gout of foot without tophus, unspecified laterality 12/24/2019    Morbid obesity (H) 12/24/2019    Type 2 diabetes mellitus without complication, without long-term current use of insulin (H) 12/24/2019       Past Surgical History:    Past Surgical History:   Procedure Laterality Date    BREAST BIOPSY, CORE RT/LT Right 2011    COLONOSCOPY N/A 4/25/2025    Procedure: COLONOSCOPY with polypectomy, resolution clip;  Surgeon: James Robb MD;  Location: [facility]    DILATION AND CURETTAGE  1987    missed ab    DILATION AND CURETTAGE  1997    missed ab    MAMMOGRAM - HIM SCAN  2011    MASTECTOMY Right 2011    due to infected biospy site. No cancer    PAP DIAGNOSTIC SMEAR  1990 US BREAST BIOPSY VAC ASSISTED LT Left 10/22/2024       Family History:    Family History   Problem Relation Age of Onset    Obesity Mother     Coronary Artery Disease Father     Depression Father     Obesity Father     Abdominal Aortic Aneurysm Father 73        cause of death       Social History:  Marital  Status:   [2]  Social History     Tobacco Use    Smoking status: Never     Passive exposure: Past (As a child)    Smokeless tobacco: Never   Vaping Use    Vaping status: Never Used   Substance Use Topics    Alcohol use: Never    Drug use: Never        Medications:    ibuprofen (ADVIL/MOTRIN) 200 MG tablet          Review of Systems   Constitutional:  Negative for chills and fever.   Respiratory:  Negative for cough and shortness of breath.    All other systems reviewed and are negative.      Physical Exam   BP: 96/58  Pulse: 73  Temp: (!) 96.3  F (35.7  C)  Resp: 18  Height: 152.4 cm (5')  Weight: 98.2 kg (216 lb 7.9 oz)  SpO2: 99 %      Physical Exam  Constitutional:       General: She is not in acute distress.     Appearance: She is not diaphoretic.   HENT:      Head: Normocephalic.      Right Ear: External ear normal.      Left Ear: External ear normal.      Nose: No congestion or rhinorrhea.      Comments: Multiple contusions and dried blood, no septal hematoma     Mouth/Throat:      Pharynx: Oropharynx is clear. No oropharyngeal exudate.   Eyes:      General: No scleral icterus.     Pupils: Pupils are equal, round, and reactive to light.   Cardiovascular:      Rate and Rhythm: Normal rate and regular rhythm.      Heart sounds: Normal heart sounds.   Pulmonary:      Effort: No respiratory distress.      Breath sounds: Normal breath sounds.   Abdominal:      General: Bowel sounds are normal.      Palpations: Abdomen is soft.      Tenderness: There is no abdominal tenderness.   Musculoskeletal:         General: No tenderness.      Cervical back: Normal range of motion and neck supple.      Right lower leg: No edema.      Left lower leg: No edema.   Skin:     General: Skin is warm.      Capillary Refill: Capillary refill takes less than 2 seconds.      Findings: No rash.   Neurological:      Mental Status: Mental status is at baseline.      Cranial Nerves: No cranial nerve deficit.      Comments: CN II-XII  intact, 5/5 strength to UE and LE, gait steady, finer to nose quick and coordinated, no gross neuro focal deficits     Psychiatric:         Mood and Affect: Mood normal.         Behavior: Behavior normal.         ED Course        Procedures             Critical Care time:               Results for orders placed or performed during the hospital encounter of 05/23/25 (from the past 24 hours)   EKG 12 lead   Result Value Ref Range    Systolic Blood Pressure  mmHg    Diastolic Blood Pressure  mmHg    Ventricular Rate 88 BPM    Atrial Rate  BPM    ID Interval  ms    QRS Duration 46 ms     ms    QTc 481 ms    P Axis  degrees    R AXIS -33 degrees    T Axis 112 degrees    Interpretation ECG       Accelerated Junctional rhythm with Premature supraventricular complexes and with occasional , and consecutive Premature ventricular complexes  Left axis deviation  Low voltage QRS  Inferior infarct , possibly acute  Cannot rule out Anterior infarct T wave abnormality, consider lateral ischemia  ** ** ACUTE MI / STEMI ** **  Consider right ventricular involvement in acute inferior infarct  Abnormal ECG  When compared with ECG of 14-May-2025 10:29,  Significant changes have occurred     XR Chest Port 1 View    Narrative    EXAM: XR CHEST PORT 1 VIEW  LOCATION: [facility]  DATE: 5/23/2025    INDICATION: chest pressure   COMPARISON: None.      Impression    IMPRESSION: Right mastectomy.    Lungs are clear. Heart and pulmonary vascularity are normal. No pneumothorax or fracture.   Head CT w/o contrast    Narrative    EXAM: CT HEAD W/O CONTRAST, CT CERVICAL SPINE W/O CONTRAST  LOCATION: [facility]  DATE: 5/23/2025    INDICATION: front of face vs wall, on lovenox, nasal trauma  COMPARISON: None.  TECHNIQUE:   1) Routine CT Head without IV contrast. Multiplanar reformats. Dose reduction techniques were used.  2) Routine CT Cervical Spine without IV contrast. Multiplanar reformats. Dose reduction techniques  were used.    FINDINGS:   HEAD CT:   INTRACRANIAL CONTENTS: No intracranial hemorrhage, extraaxial collection, or mass effect.  No CT evidence of acute infarct. Normal parenchymal attenuation. Normal ventricles and sulci.     VISUALIZED ORBITS/SINUSES/MASTOIDS: No intraorbital abnormality. There is an air-fluid level within the right maxillary sinus No middle ear or mastoid effusion.    BONES/SOFT TISSUES: The calvarium is intact. There are multiple nasal fractures visualized associated with soft tissue swelling and subcutaneous free air.    CERVICAL SPINE CT:   VERTEBRA: There is a slight anterior listhesis of C3 in relation to C4 and C4 in relation to C5. The vertebral body heights are well-maintained throughout. No fracture or posttraumatic subluxation.     CANAL/FORAMINA: There is mild diffuse degenerative disc disease throughout the cervical spine with a slight loss of height, endplate changes and small anterior osteophyte formations. There is diffuse facet arthropathy visualized. There is no significant   canal compromise or neural foraminal narrowing throughout the cervical spine spine.    PARASPINAL: There is subcutaneous free air noted within the left supraclavicular fossa. Visualized lung fields are clear.      Impression    IMPRESSION:  HEAD CT:  1.  No CT finding of a mass, hemorrhage or focal area suggestive of acute infarct.  2.  Multiple nasal bone fractures associated with soft tissue swelling and subcutaneous free air.    CERVICAL SPINE CT:  1.  No CT evidence for acute fracture or post traumatic subluxation.  2.  No significant canal compromise or neural foraminal narrowing throughout cervical spine.     CT Cervical Spine w/o Contrast    Narrative    EXAM: CT HEAD W/O CONTRAST, CT CERVICAL SPINE W/O CONTRAST  LOCATION: [facility]  DATE: 5/23/2025    INDICATION: front of face vs wall, on lovenox, nasal trauma  COMPARISON: None.  TECHNIQUE:   1) Routine CT Head without IV contrast.  Multiplanar reformats. Dose reduction techniques were used.  2) Routine CT Cervical Spine without IV contrast. Multiplanar reformats. Dose reduction techniques were used.    FINDINGS:   HEAD CT:   INTRACRANIAL CONTENTS: No intracranial hemorrhage, extraaxial collection, or mass effect.  No CT evidence of acute infarct. Normal parenchymal attenuation. Normal ventricles and sulci.     VISUALIZED ORBITS/SINUSES/MASTOIDS: No intraorbital abnormality. There is an air-fluid level within the right maxillary sinus No middle ear or mastoid effusion.    BONES/SOFT TISSUES: The calvarium is intact. There are multiple nasal fractures visualized associated with soft tissue swelling and subcutaneous free air.    CERVICAL SPINE CT:   VERTEBRA: There is a slight anterior listhesis of C3 in relation to C4 and C4 in relation to C5. The vertebral body heights are well-maintained throughout. No fracture or posttraumatic subluxation.     CANAL/FORAMINA: There is mild diffuse degenerative disc disease throughout the cervical spine with a slight loss of height, endplate changes and small anterior osteophyte formations. There is diffuse facet arthropathy visualized. There is no significant   canal compromise or neural foraminal narrowing throughout the cervical spine spine.    PARASPINAL: There is subcutaneous free air noted within the left supraclavicular fossa. Visualized lung fields are clear.      Impression    IMPRESSION:  HEAD CT:  1.  No CT finding of a mass, hemorrhage or focal area suggestive of acute infarct.  2.  Multiple nasal bone fractures associated with soft tissue swelling and subcutaneous free air.    CERVICAL SPINE CT:  1.  No CT evidence for acute fracture or post traumatic subluxation.  2.  No significant canal compromise or neural foraminal narrowing throughout cervical spine.     CBC with platelets differential    Narrative    The following orders were created for panel order CBC with platelets  differential.  Procedure                               Abnormality         Status                     ---------                               -----------         ------                     CBC with platelets and ...[[id number]]  Abnormal            Final result                 Please view results for these tests on the individual orders.   INR   Result Value Ref Range    INR 0.99 0.85 - 1.15    PT 13.3 11.8 - 14.8 Seconds   Comprehensive metabolic panel   Result Value Ref Range    Sodium 136 135 - 145 mmol/L    Potassium 4.0 3.4 - 5.3 mmol/L    Carbon Dioxide (CO2) 20 (L) 22 - 29 mmol/L    Anion Gap 15 7 - 15 mmol/L    Urea Nitrogen 24.3 (H) 8.0 - 23.0 mg/dL    Creatinine 0.85 0.51 - 0.95 mg/dL    GFR Estimate 75 >60 mL/min/1.73m2    Calcium 9.4 8.8 - 10.4 mg/dL    Chloride 101 98 - 107 mmol/L    Glucose 219 (H) 70 - 99 mg/dL    Alkaline Phosphatase 62 40 - 150 U/L    AST 23 0 - 45 U/L    ALT 20 0 - 50 U/L    Protein Total 6.3 (L) 6.4 - 8.3 g/dL    Albumin 3.8 3.5 - 5.2 g/dL    Bilirubin Total 0.5 <=1.2 mg/dL   Blood gas venous   Result Value Ref Range    pH Venous 7.35 7.32 - 7.43    pCO2 Venous 42 40 - 50 mm Hg    pO2 Venous 13 (L) 25 - 47 mm Hg    Bicarbonate Venous 23 21 - 28 mmol/L    Base Excess/Deficit Venous -2.3 -3.0 - 3.0 mmol/L    FIO2 21     Oxyhemoglobin Venous 13 (L) 70 - 75 %    O2 Sat, Venous 13.0 (L) 70.0 - 75.0 %    Narrative    In healthy individuals, oxyhemoglobin (O2Hb) and oxygen saturation (SO2) are approximately equal. In the presence of dyshemoglobins, oxyhemoglobin can be considerably lower than oxygen saturation.   Troponin T, High Sensitivity   Result Value Ref Range    Troponin T, High Sensitivity 11 <=14 ng/L   NT-proBNP   Result Value Ref Range    NT-proBNP 142 0 - 353 pg/mL   CBC with platelets and differential   Result Value Ref Range    WBC Count 16.3 (H) 4.0 - 11.0 10e3/uL    RBC Count 4.06 3.80 - 5.20 10e6/uL    Hemoglobin 12.3 11.7 - 15.7 g/dL    Hematocrit 37.9 35.0 - 47.0 %     MCV 93 78 - 100 fL    MCH 30.3 26.5 - 33.0 pg    MCHC 32.5 31.5 - 36.5 g/dL    RDW 14.3 10.0 - 15.0 %    Platelet Count 269 150 - 450 10e3/uL    % Neutrophils 91 %    % Lymphocytes 4 %    % Monocytes 4 %    % Eosinophils 0 %    % Basophils 0 %    % Immature Granulocytes 0 %    NRBCs per 100 WBC 0 <1 /100    Absolute Neutrophils 14.8 (H) 1.6 - 8.3 10e3/uL    Absolute Lymphocytes 0.7 (L) 0.8 - 5.3 10e3/uL    Absolute Monocytes 0.7 0.0 - 1.3 10e3/uL    Absolute Eosinophils 0.0 0.0 - 0.7 10e3/uL    Absolute Basophils 0.0 0.0 - 0.2 10e3/uL    Absolute Immature Granulocytes 0.1 <=0.4 10e3/uL    Absolute NRBCs 0.0 10e3/uL   Troponin T, High Sensitivity   Result Value Ref Range    Troponin T, High Sensitivity 13 <=14 ng/L       Medications   sodium chloride 0.9% BOLUS 1,000 mL (0 mLs Intravenous Stopped 5/24/25 0113)       Assessments & Plan (with Medical Decision Making)     I have reviewed the nursing notes.    I have reviewed the findings, diagnosis, plan and need for follow up with the patient.          Medical Decision Making  The patient's presentation was of moderate complexity (an acute illness with systemic symptoms).    The patient's evaluation involved:  an assessment requiring an independent historian ()  ordering and/or review of 3+ test(s) in this encounter (see separate area of note for details)    The patient's management necessitated moderate risk (prescription drug management including medications given in the ED).    64-year-old female here after near syncope, I suspect due to significant dehydration from fasting and a poor procedure and not drinking enough after the fact.  Was asymptomatic after 2 L of IV fluids were given.  No intracranial injuries on CT imaging.  No evidence of ischemia or arrhythmia on EKG or labs.    Discharge Medication List as of 5/24/2025  2:23 AM          Final diagnoses:   Fall, initial encounter   Closed fracture of nasal bone, initial encounter   Dehydration        5/23/2025   [facility]       Brandan Dutton MD  05/24/25 0621

## 2025-05-24 NOTE — ED TRIAGE NOTES
Patient presents via [facility] from home. Patient had a right mastectomy revision today and was discharged home. Patient states that she got up to go into the bathroom and she was very dizzy and nauseated. Upon walking to the bathroom, patient fell into radiator and does have some bruising across nose and some bleeding from nares. Patient did not lose consciousness at that time. Patient then did go into the bathroom and she had a syncopal episode while sitting on toilet. Upon [facility] arrival to home patient was alert and awake. Patient was hypotensive upon EMS arrival and IV was established, IVF started and 4mg of zofran given for nausea. Patient reports that nausea is better after medication and currently denies pain.

## 2025-05-27 ENCOUNTER — OFFICE VISIT (OUTPATIENT)
Dept: FAMILY MEDICINE | Facility: OTHER | Age: 65
End: 2025-05-27
Attending: OTOLARYNGOLOGY
Payer: COMMERCIAL

## 2025-05-27 ENCOUNTER — HOSPITAL ENCOUNTER (OUTPATIENT)
Dept: CT IMAGING | Facility: HOSPITAL | Age: 65
Discharge: HOME | End: 2025-05-27
Attending: OTOLARYNGOLOGY
Payer: COMMERCIAL

## 2025-05-27 ENCOUNTER — OFFICE VISIT (OUTPATIENT)
Dept: OTOLARYNGOLOGY | Facility: OTHER | Age: 65
End: 2025-05-27
Attending: OTOLARYNGOLOGY
Payer: COMMERCIAL

## 2025-05-27 ENCOUNTER — RESULTS FOLLOW-UP (OUTPATIENT)
Dept: SURGERY | Facility: CLINIC | Age: 65
End: 2025-05-27

## 2025-05-27 ENCOUNTER — PREP FOR PROCEDURE (OUTPATIENT)
Dept: OTOLARYNGOLOGY | Facility: OTHER | Age: 65
End: 2025-05-27

## 2025-05-27 ENCOUNTER — TELEPHONE (OUTPATIENT)
Dept: OTOLARYNGOLOGY | Facility: OTHER | Age: 65
End: 2025-05-27

## 2025-05-27 VITALS
HEIGHT: 60 IN | DIASTOLIC BLOOD PRESSURE: 62 MMHG | WEIGHT: 213 LBS | SYSTOLIC BLOOD PRESSURE: 110 MMHG | OXYGEN SATURATION: 99 % | TEMPERATURE: 98.6 F | HEART RATE: 114 BPM | BODY MASS INDEX: 41.82 KG/M2

## 2025-05-27 VITALS
RESPIRATION RATE: 20 BRPM | DIASTOLIC BLOOD PRESSURE: 82 MMHG | TEMPERATURE: 97.5 F | SYSTOLIC BLOOD PRESSURE: 139 MMHG | HEIGHT: 60 IN | BODY MASS INDEX: 41.82 KG/M2 | WEIGHT: 213 LBS | HEART RATE: 121 BPM

## 2025-05-27 DIAGNOSIS — S02.2XXA CLOSED FRACTURE OF NASAL BONE, INITIAL ENCOUNTER: Primary | ICD-10-CM

## 2025-05-27 DIAGNOSIS — S02.2XXA CLOSED FRACTURE OF NASAL BONE, INITIAL ENCOUNTER: ICD-10-CM

## 2025-05-27 DIAGNOSIS — S02.2XXA: Primary | ICD-10-CM

## 2025-05-27 DIAGNOSIS — R55 NEAR SYNCOPE: ICD-10-CM

## 2025-05-27 DIAGNOSIS — E11.9 TYPE 2 DIABETES MELLITUS WITHOUT COMPLICATION, WITHOUT LONG-TERM CURRENT USE OF INSULIN (H): ICD-10-CM

## 2025-05-27 DIAGNOSIS — M1A.0790 IDIOPATHIC CHRONIC GOUT OF FOOT WITHOUT TOPHUS, UNSPECIFIED LATERALITY: ICD-10-CM

## 2025-05-27 DIAGNOSIS — J34.2 DEVIATED NASAL SEPTUM: ICD-10-CM

## 2025-05-27 DIAGNOSIS — E66.01 MORBID OBESITY (H): ICD-10-CM

## 2025-05-27 DIAGNOSIS — I51.89 DIASTOLIC DYSFUNCTION: ICD-10-CM

## 2025-05-27 DIAGNOSIS — S02.2XXK CLOSED FRACTURE OF NASAL BONE WITH NONUNION, SUBSEQUENT ENCOUNTER: ICD-10-CM

## 2025-05-27 DIAGNOSIS — S02.2XXA CLOSED FRACTURE OF NASAL SEPTUM, INITIAL ENCOUNTER: ICD-10-CM

## 2025-05-27 DIAGNOSIS — Z01.818 PRE-OP EXAM: Primary | ICD-10-CM

## 2025-05-27 LAB
ANION GAP SERPL CALCULATED.3IONS-SCNC: 13 MMOL/L (ref 7–15)
ATRIAL RATE - MUSE: 98 BPM
ATRIAL RATE - MUSE: NORMAL BPM
BASOPHILS # BLD AUTO: 0 10E3/UL (ref 0–0.2)
BASOPHILS NFR BLD AUTO: 0 %
BUN SERPL-MCNC: 13.3 MG/DL (ref 8–23)
CALCIUM SERPL-MCNC: 9.8 MG/DL (ref 8.8–10.4)
CHLORIDE SERPL-SCNC: 100 MMOL/L (ref 98–107)
CREAT SERPL-MCNC: 0.7 MG/DL (ref 0.51–0.95)
DIASTOLIC BLOOD PRESSURE - MUSE: NORMAL MMHG
DIASTOLIC BLOOD PRESSURE - MUSE: NORMAL MMHG
EGFRCR SERPLBLD CKD-EPI 2021: >90 ML/MIN/1.73M2
EOSINOPHIL # BLD AUTO: 0 10E3/UL (ref 0–0.7)
EOSINOPHIL NFR BLD AUTO: 0 %
ERYTHROCYTE [DISTWIDTH] IN BLOOD BY AUTOMATED COUNT: 14.5 % (ref 10–15)
GLUCOSE SERPL-MCNC: 116 MG/DL (ref 70–99)
HCO3 SERPL-SCNC: 24 MMOL/L (ref 22–29)
HCT VFR BLD AUTO: 31 % (ref 35–47)
HGB BLD-MCNC: 10.2 G/DL (ref 11.7–15.7)
IMM GRANULOCYTES # BLD: 0.1 10E3/UL
IMM GRANULOCYTES NFR BLD: 1 %
INTERPRETATION ECG - MUSE: NORMAL
INTERPRETATION ECG - MUSE: NORMAL
LYMPHOCYTES # BLD AUTO: 1.6 10E3/UL (ref 0.8–5.3)
LYMPHOCYTES NFR BLD AUTO: 19 %
MCH RBC QN AUTO: 30.3 PG (ref 26.5–33)
MCHC RBC AUTO-ENTMCNC: 32.9 G/DL (ref 31.5–36.5)
MCV RBC AUTO: 92 FL (ref 78–100)
MONOCYTES # BLD AUTO: 0.7 10E3/UL (ref 0–1.3)
MONOCYTES NFR BLD AUTO: 8 %
NEUTROPHILS # BLD AUTO: 6.1 10E3/UL (ref 1.6–8.3)
NEUTROPHILS NFR BLD AUTO: 72 %
NRBC # BLD AUTO: 0 10E3/UL
NRBC BLD AUTO-RTO: 0 /100
P AXIS - MUSE: 3 DEGREES
P AXIS - MUSE: NORMAL DEGREES
PLATELET # BLD AUTO: 297 10E3/UL (ref 150–450)
POTASSIUM SERPL-SCNC: 3.8 MMOL/L (ref 3.4–5.3)
PR INTERVAL - MUSE: 128 MS
PR INTERVAL - MUSE: NORMAL MS
QRS DURATION - MUSE: 146 MS
QRS DURATION - MUSE: 46 MS
QT - MUSE: 394 MS
QT - MUSE: 398 MS
QTC - MUSE: 481 MS
QTC - MUSE: 503 MS
R AXIS - MUSE: -33 DEGREES
R AXIS - MUSE: -34 DEGREES
RBC # BLD AUTO: 3.37 10E6/UL (ref 3.8–5.2)
SODIUM SERPL-SCNC: 137 MMOL/L (ref 135–145)
SYSTOLIC BLOOD PRESSURE - MUSE: NORMAL MMHG
SYSTOLIC BLOOD PRESSURE - MUSE: NORMAL MMHG
T AXIS - MUSE: 112 DEGREES
T AXIS - MUSE: 3 DEGREES
VENTRICULAR RATE- MUSE: 88 BPM
VENTRICULAR RATE- MUSE: 98 BPM
WBC # BLD AUTO: 8.6 10E3/UL (ref 4–11)

## 2025-05-27 PROCEDURE — G0463 HOSPITAL OUTPT CLINIC VISIT: HCPCS | Performed by: STUDENT IN AN ORGANIZED HEALTH CARE EDUCATION/TRAINING PROGRAM

## 2025-05-27 PROCEDURE — 93010 ELECTROCARDIOGRAM REPORT: CPT | Performed by: INTERNAL MEDICINE

## 2025-05-27 PROCEDURE — 31231 NASAL ENDOSCOPY DX: CPT | Performed by: PHYSICIAN ASSISTANT

## 2025-05-27 PROCEDURE — 70486 CT MAXILLOFACIAL W/O DYE: CPT | Mod: 26 | Performed by: RADIOLOGY

## 2025-05-27 PROCEDURE — 36415 COLL VENOUS BLD VENIPUNCTURE: CPT | Mod: ZL | Performed by: STUDENT IN AN ORGANIZED HEALTH CARE EDUCATION/TRAINING PROGRAM

## 2025-05-27 PROCEDURE — 70486 CT MAXILLOFACIAL W/O DYE: CPT

## 2025-05-27 PROCEDURE — 93005 ELECTROCARDIOGRAM TRACING: CPT | Performed by: STUDENT IN AN ORGANIZED HEALTH CARE EDUCATION/TRAINING PROGRAM

## 2025-05-27 PROCEDURE — G0463 HOSPITAL OUTPT CLINIC VISIT: HCPCS | Mod: 25,27 | Performed by: PHYSICIAN ASSISTANT

## 2025-05-27 PROCEDURE — 82310 ASSAY OF CALCIUM: CPT | Mod: ZL | Performed by: STUDENT IN AN ORGANIZED HEALTH CARE EDUCATION/TRAINING PROGRAM

## 2025-05-27 PROCEDURE — 85025 COMPLETE CBC W/AUTO DIFF WBC: CPT | Mod: ZL | Performed by: STUDENT IN AN ORGANIZED HEALTH CARE EDUCATION/TRAINING PROGRAM

## 2025-05-27 RX ORDER — ONDANSETRON 4 MG/1
TABLET, ORALLY DISINTEGRATING ORAL
COMMUNITY
Start: 2025-05-23

## 2025-05-27 RX ORDER — HYDROCODONE BITARTRATE AND ACETAMINOPHEN 7.5; 325 MG/1; MG/1
TABLET ORAL
COMMUNITY
Start: 2025-05-23

## 2025-05-27 ASSESSMENT — PAIN SCALES - GENERAL: PAINLEVEL_OUTOF10: NO PAIN (0)

## 2025-05-27 NOTE — PATIENT INSTRUCTIONS
Thank you for allowing [facility] and our ENT team to participate in your care.  If your medications are too expensive, please give the nurse a call.  We can possibly change this medication.  If you have a scheduling or an appointment question please contact our Health Unit Coordinator at their direct line [phone].   ALL nursing questions or concerns can be directed to your ENT nurse, Nancy at: [phone].

## 2025-05-27 NOTE — LETTER
2025      Mayte Parmar  [address] [place] [address]      Dear Colleague,    Thank you for referring your patient, Mayte Parmar, to the [facility] [place]. Please see a copy of my visit note below.    Otolaryngology Consultation    Patient: Mayte Parmar  : 1960    Chief Complaint   Patient presents with     Nose Problem     Nasal fracture         HPI:  Mayte Parmar is a 64 year old female seen today for Nasal fracture  Patient presents to ENT for concerns regarding nasal fracture.  Patient was seen in emergency room on 2025 for facial trauma after near syncopal episode.  She reportedly had a mastectomy completed prior to the surgery date.  She did have a meal but was fasting prior to this.  She reportedly became lightheaded after standing up and nearly passed out.  She was noted to have multiple contusions dried blood and no septal hematoma.  She improved symptomatically wise with 2 L of fluid.  Imaging completed including CT head and CT cervical spine.  Head CT and no CT finding of mass hemorrhage or focal area suggestive of acute infarct.  She did have multiple nasal bone fractures associated with soft tissue swelling and subcutaneous free air.  She presented today for CT facial bones.      Mayte presents for review of nasal injury.   Patient stated she was at home, and got very dizzy and walking to living room and down. She She felt immediate concern for nasal fracture. She went back to bathroom and fell again. She has not felt well since her surgery. She had increase in pulse rate/ general weakness/ dyspnea at this time. She feels elevated pulse with any exertion.  She is not sure regarding surgical procedure at this time.     She is only able to breath thru her left nasal passage.   Nose is not significant painful, unless touched.   She was given medication for clotting after her surgery. Bleeding immediately then a constant drop form her nose.   She did use nasal  decongestant spray.       No prior nasal or trauma  No prior nasal surgery  No vision changes.   FINDINGS: There are comminuted fractures of the nasal bones. The tip  of the nasal bones is displaced to the left. There is a fracture of  the nasal septum anteriorly and superiorly.     There are advanced arthritic changes seen in the temporomandibular  joints bilaterally no mandibular fracture is seen. The pterygoid  plates are intact. There is a small polyp or retention cyst seen in  the floor of the right maxillary sinus. The retropharyngeal soft  tissues are normal. The bony orbits are intact.                                                                        IMPRESSION: Fracture of the nasal bones and anterior nasal septum.     GARFIELD ORNELAS MD           Current Outpatient Rx   Medication Sig Dispense Refill     ibuprofen (ADVIL/MOTRIN) 200 MG tablet Take 200 mg by mouth every 4 hours as needed for mild pain.         Allergies: Patient has no known allergies.     Past Medical History:   Diagnosis Date     Diabetes (H)      Idiopathic chronic gout of foot without tophus, unspecified laterality 12/24/2019     Morbid obesity (H) 12/24/2019     Type 2 diabetes mellitus without complication, without long-term current use of insulin (H) 12/24/2019       Past Surgical History:   Procedure Laterality Date     BREAST BIOPSY, CORE RT/LT Right 2011     COLONOSCOPY N/A 4/25/2025    Procedure: COLONOSCOPY with polypectomy, resolution clip;  Surgeon: James Robb MD;  Location: [facility]     DILATION AND CURETTAGE  1987    missed ab     DILATION AND CURETTAGE  1997    missed ab     MAMMOGRAM - HIM SCAN  2011     MASTECTOMY Right 2011    due to infected biospy site. No cancer     PAP DIAGNOSTIC SMEAR  1990 US BREAST BIOPSY VAC ASSISTED LT Left 10/22/2024       ENT family history reviewed    Social History     Tobacco Use     Smoking status: Never     Passive exposure: Past (As a child)     Smokeless tobacco: Never    Vaping Use     Vaping status: Never Used   Substance Use Topics     Alcohol use: Never     Drug use: Never       Review of Systems  ROS: 10 point ROS neg other than the symptoms noted above in the HPI     Physical Exam  /82 (BP Location: Right arm, Patient Position: Sitting, Cuff Size: Adult Large)   Pulse (!) 121   Temp 97.5  F (36.4  C) (Tympanic)   Resp 20   Ht 1.524 m (5')   Wt 96.6 kg (213 lb)   BMI 41.60 kg/m      General - The patient is well nourished and well developed, and appears to have good nutritional status.  Alert and oriented to person and place, answers questions and cooperates with examination appropriately.   Head and Face - Normocephalic   Facial bruising present along orbits/ nasal bridge. The facial nerve is intact, with strong symmetric movements.  Voice and Breathing - The patient was breathing comfortably without the use of accessory muscles. There was no wheezing, stridor, or stertor.  The patients voice was clear and strong, and had appropriate pitch and quality.  Ears -The external auditory canals are patent, the tympanic membranes are intact without effusion, retraction or mass.  Bony landmarks are intact.  Eyes - bruising throughout along orbits  Extraocular movements intact, and the pupils were reactive to light.  Sclera were not icteric or injected, conjunctiva were pink and moist.  Mouth - Examination of the oral cavity showed pink, healthy oral mucosa. No lesions or ulcerations noted.  The tongue was mobile and midline, and the dentition were in good condition.    Throat - The walls of the oropharynx were smooth, pink, moist, symmetric, and had no lesions or ulcerations.  The tonsillar pillars and soft palate were symmetric.  The uvula was midline on elevation.    Neck - Normal midline excursion of the laryngotracheal complex during swallowing.  Full range of motion on passive movement.  Palpation of the occipital, submental, submandibular, internal jugular chain, and  supraclavicular nodes did not demonstrate any abnormal lymph nodes or masses.  Palpation of the thyroid was soft and smooth, with no nodules or goiter appreciated.  The trachea was mobile and midline.  Nose - External contour is asymmetric, edematous, she has ecchymosis/ bruising throughout along nasal dorsum deviation, nasal tip deviation to right. Tenderness to palpation.       To further evaluate the nasal cavity, I performed rigid nasal endoscopy.  I first sprayed the nasal cavity bilaterally with a mix of lidocaine and neosynephrine.  I then began on the left side using a 2.7mm, 30 degree rigid nasal endoscope.  The septum was deflected to the Right and the nasal airway was very tight. I was able to visualize along right septum which does not appear w/ hematoma, severe obstruction due to injury.   I then turned my attention to the contralateral side. Left septum without hematoma. The middle turbinate and middle meatus were clearly visualized and without polyp or purulent secretions.   I was able to get limited look at the posterior nasal cavity and sphenoethmoid recess.  There were no excess secretions or polypoid degeneration of the mucosa.        Impression and Plan- Mayte DONTE Parmar is a 64 year old female with:      ICD-10-CM    1. Bilateral Nasal fracture  S02.2XXA lidocaine 2%-oxymetazoline 0.025% nasal solution 2 spray      2. Deviated nasal septum  J34.2       3. Closed fracture of nasal septum, initial encounter  S02.2XXA           Reviewed w/ mayte options of surgical repair vs. Observation. She has severe nasal obstruction and wishes to proceed with surgery. Proceed in surgery with Dr. Dunbar for closed reduction nasal fracture, possible septoplasty.   She was informed regarding postoperative recovery.       Risks and complications discussed include general anesthesia, bleeding, infection, septal perforation, anosmia, epiphora, CSF rhinorrhea, atrophic rhinitis, scar formation, numbness over the  incision, need for additional surgery and no guarantee is made that the nasal bones will be completely straight.  Understanding is expressed, all questions are answered and wishes are to proceed with surgical intervention    Ana Maria Chan PA-C  ENT  [facility], [place]      Again, thank you for allowing me to participate in the care of your patient.        Sincerely,        Ana Maria Chan PA-C    Electronically signed

## 2025-05-27 NOTE — PROGRESS NOTES
Preoperative Evaluation  [facility] - [place]  [address]  [place] [address]  Phone: [phone]  Primary Provider: Miroslava Solorzano MD  Pre-op Performing Provider: Miroslava Solorzano MD  May 27, 2025           5/27/2025   Surgical Information   What procedure is being done? rhinoplasty surgery   Facility or Hospital where procedure/surgery will be performed: [facility]   Who is doing the procedure / surgery? dr sosa   Date of surgery / procedure: [id number]   Time of surgery / procedure: unknown   Where do you plan to recover after surgery? at home with family     Fax number for surgical facility: Note does not need to be faxed, will be available electronically in Epic.    Assessment & Plan     The proposed surgical procedure is considered INTERMEDIATE risk.    Assessment & Plan     Pre-op exam  Mayte Parmar presents to clinic for pre-op evaluation  She was seen for pre-op on 5/14 just before her breast surgery which took place on 5/23  We repeated ECG today due to her subjective palpitations.  Her rhythm was regular but HR was high.  She is understandably under a lot of stress with her recent surgery, her recent facial trauma and also her mother with Alzheimer's dementia moving in with her (Patient to be primary provider for mother).  Blood work from ED reviewed.  CBC and BMP repeated.  BUN/Cr less than 20 now and CBC normalized.  Most likely acute stress reaction in the ED.    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    Near syncope  Cause of significant nasal trauma.  Dehydration (likely) vs possible residual anesthesia effects?  Would recommend prolonged monitoring after surgery to ensure she is safe to discharge  home.  Should have someone to help her out for the first few days post-op and monitor closely with her history of recent fall    Type 2 diabetes mellitus without complication, without long-term current use of insulin (H)  Quite well controlled.  She is essentially diet controlled    Morbid obesity (H)  ASA category III by BMI alone    Diastolic dysfunction  Grade 1 diastolic dysfunction.  She is essentially euvolemic on my exam and she denies any exertional symptoms    Idiopathic chronic gout of foot without tophus, unspecified laterality  Clinically stable       - No identified additional risk factors other than previously addressed    Antiplatelet or Anticoagulation Medication Instructions   - We reviewed the medication list and the patient is not on an antiplatelet or anticoagulation medications.    Additional Medication Instructions   - ibuprofen (Advil, Motrin): DO NOT TAKE 1 day before surgery.     Recommendation  Approval given to proceed with proposed procedure, without further diagnostic evaluation.      Arelis Hu is a 64 year old, presenting for the following:  Pre-Op Exam    HPI:    Ms. Mayte Parmar presents to clinic today for a pre-op evaluation.   I had actually seen her for a pre-op on 5/14 prior to her L breast reduction surgery with Dr. Pineda on 5/23.  Unfortunately a few hours after her surgery, upon her return home, she had a near syncopal episode and sustained significant facial trauma.   Per the patient, she became dizzy and lightheaded upon standing up at home and nearly lost consciousness.  She presents to the ED of 5/23 and there was concern for dehydration with BUN/Cr ratio. She was given IVF with good response.  She was evaluated by ENT today and had a repeat dedicated CT of the facial bones which show communited fractures of the nasal bones with the tip of the nasal bones displaced to the left.  Also noted was a fracture of the nasal septum anteriorly and superiorly.   No issues  with surgery in the past.  No bleeding diathesis.   Unable to complete 4 METS of exercise 2/2 body habitus and arthritis.        5/27/2025     3:38 PM   Additional Questions   Roomed by Silvia WEBSTER            5/27/2025   Pre-Op Questionnaire   Have you ever had a heart attack or stroke? No   Have you ever had surgery on your heart or blood vessels, such as a stent placement, a coronary artery bypass, or surgery on an artery in your head, neck, heart, or legs? No   Do you have chest pain with activity? No   Do you have a history of heart failure? (!) YES Mild diastolic dysfunction   Do you currently have a cold, bronchitis or symptoms of other infection? No   Do you have a cough, shortness of breath, or wheezing? No   Do you or anyone in your family have previous history of blood clots? No   Do you or does anyone in your family have a serious bleeding problem such as prolonged bleeding following surgeries or cuts? No   Have you ever had problems with anemia or been told to take iron pills? No   Have you had any abnormal blood loss such as black, tarry or bloody stools, or abnormal vaginal bleeding? No   Have you ever had a blood transfusion? No   Are you willing to have a blood transfusion if it is medically needed before, during, or after your surgery? Yes   Have you or any of your relatives ever had problems with anesthesia? No   Do you have sleep apnea, excessive snoring or daytime drowsiness? No   Do you have any artifical heart valves or other implanted medical devices like a pacemaker, defibrillator, or continuous glucose monitor? No   Do you have artificial joints? No   Are you allergic to latex? No     Advance Care Planning    Discussed advance care planning with patient; informed AVS has link to [employer].    Preoperative Review of    reviewed - no record of controlled substances prescribed.      Status of Chronic Conditions:  See problem list for active medical problems.  Problems all longstanding  and stable, except as noted/documented.  See ROS for pertinent symptoms related to these conditions.    Patient Active Problem List    Diagnosis Date Noted    Morbid obesity (H) 12/24/2019     Priority: Medium    Type 2 diabetes mellitus without complication, without long-term current use of insulin (H) 12/24/2019     Priority: Medium    Idiopathic chronic gout of foot without tophus, unspecified laterality 12/24/2019     Priority: Medium      Past Medical History:   Diagnosis Date    Diabetes (H)     Idiopathic chronic gout of foot without tophus, unspecified laterality 12/24/2019    Morbid obesity (H) 12/24/2019    Type 2 diabetes mellitus without complication, without long-term current use of insulin (H) 12/24/2019     Past Surgical History:   Procedure Laterality Date    BREAST BIOPSY, CORE RT/LT Right 2011    COLONOSCOPY N/A 4/25/2025    Procedure: COLONOSCOPY with polypectomy, resolution clip;  Surgeon: James Robb MD;  Location: [facility]    DILATION AND CURETTAGE  1987    missed ab    DILATION AND CURETTAGE  1997    missed ab    MAMMOGRAM - HIM SCAN  2011    MASTECTOMY Right 2011    due to infected biospy site. No cancer    PAP DIAGNOSTIC SMEAR  1990 US BREAST BIOPSY VAC ASSISTED LT Left 10/22/2024     Current Outpatient Medications   Medication Sig Dispense Refill    HYDROcodone-acetaminophen (NORCO) 7.5-325 MG per tablet       ibuprofen (ADVIL/MOTRIN) 200 MG tablet Take 200 mg by mouth every 4 hours as needed for mild pain.      ondansetron (ZOFRAN ODT) 4 MG ODT tab          No Known Allergies     Social History     Tobacco Use    Smoking status: Never     Passive exposure: Past (As a child)    Smokeless tobacco: Never   Substance Use Topics    Alcohol use: Never     Family History   Problem Relation Age of Onset    Obesity Mother     Coronary Artery Disease Father     Depression Father     Obesity Father     Abdominal Aortic Aneurysm Father 73        cause of death     History   Drug Use Unknown              Review of Systems  Constitutional, HEENT, cardiovascular, pulmonary, GI, , musculoskeletal, neuro, skin, endocrine and psych systems are negative, except as otherwise noted.    Objective    /62 (BP Location: Right arm, Patient Position: Sitting, Cuff Size: Adult Large)   Pulse 114   Temp 98.6  F (37  C) (Tympanic)   Ht 1.524 m (5')   Wt 96.6 kg (213 lb)   SpO2 99%   BMI 41.60 kg/m     Estimated body mass index is 41.6 kg/m  as calculated from the following:    Height as of this encounter: 1.524 m (5').    Weight as of this encounter: 96.6 kg (213 lb).  Physical Exam  GENERAL: alert and no distress  EYES: Eyes grossly normal to inspection, PERRL and conjunctivae and sclerae normal  HENT: ear canals and TM's normal, nose and mouth without ulcers or lesions  NECK: no adenopathy, no asymmetry, masses, or scars  RESP: lungs clear to auscultation - no rales, rhonchi or wheezes  CV: regular rate and rhythm, normal S1 S2, no S3 or S4, no murmur, click or rub, no peripheral edema  ABDOMEN: soft, nontender, no hepatosplenomegaly, no masses and bowel sounds normal  MS: no gross musculoskeletal defects noted, no edema  SKIN: Significant ecchymoses around eyes and nose  NEURO: Normal strength and tone, mentation intact and speech normal  PSYCH: mentation appears normal, affect normal/bright    Recent Labs   Lab Test 05/23/25  2144 05/14/25  1035 06/21/24  0850   HGB 12.3 14.3 15.3    221 254   INR 0.99  --   --     136 141   POTASSIUM 4.0 3.8 4.1   CR 0.85 0.69 0.77   A1C  --  5.2 5.4        Diagnostics  Recent Results (from the past 24 hours)   EKG 12-lead complete w/read - (Clinic Performed)    Collection Time: 05/27/25  3:46 PM   Result Value Ref Range    Systolic Blood Pressure  mmHg    Diastolic Blood Pressure  mmHg    Ventricular Rate 98 BPM    Atrial Rate 98 BPM    VT Interval 128 ms    QRS Duration 146 ms     ms    QTc 503 ms    P Axis 3 degrees    R AXIS -34 degrees    T Axis 3  degrees    Interpretation ECG       Sinus rhythm  Left axis deviation  Right bundle branch block  Abnormal ECG  When compared with ECG of 23-May-2025 21:20,  Sinus rhythm has replaced Junctional rhythm  Right bundle branch block is now Present  Minimal criteria for Anterior infarct are no longer Present  Confirmed by MD Velazco Anthony ([id number]) on 5/27/2025 5:56:01 PM     Basic metabolic panel    Collection Time: 05/27/25  4:10 PM   Result Value Ref Range    Sodium 137 135 - 145 mmol/L    Potassium 3.8 3.4 - 5.3 mmol/L    Chloride 100 98 - 107 mmol/L    Carbon Dioxide (CO2) 24 22 - 29 mmol/L    Anion Gap 13 7 - 15 mmol/L    Urea Nitrogen 13.3 8.0 - 23.0 mg/dL    Creatinine 0.70 0.51 - 0.95 mg/dL    GFR Estimate >90 >60 mL/min/1.73m2    Calcium 9.8 8.8 - 10.4 mg/dL    Glucose 116 (H) 70 - 99 mg/dL   CBC with platelets and differential    Collection Time: 05/27/25  4:10 PM   Result Value Ref Range    WBC Count 8.6 4.0 - 11.0 10e3/uL    RBC Count 3.37 (L) 3.80 - 5.20 10e6/uL    Hemoglobin 10.2 (L) 11.7 - 15.7 g/dL    Hematocrit 31.0 (L) 35.0 - 47.0 %    MCV 92 78 - 100 fL    MCH 30.3 26.5 - 33.0 pg    MCHC 32.9 31.5 - 36.5 g/dL    RDW 14.5 10.0 - 15.0 %    Platelet Count 297 150 - 450 10e3/uL    % Neutrophils 72 %    % Lymphocytes 19 %    % Monocytes 8 %    % Eosinophils 0 %    % Basophils 0 %    % Immature Granulocytes 1 %    NRBCs per 100 WBC 0 <1 /100    Absolute Neutrophils 6.1 1.6 - 8.3 10e3/uL    Absolute Lymphocytes 1.6 0.8 - 5.3 10e3/uL    Absolute Monocytes 0.7 0.0 - 1.3 10e3/uL    Absolute Eosinophils 0.0 0.0 - 0.7 10e3/uL    Absolute Basophils 0.0 0.0 - 0.2 10e3/uL    Absolute Immature Granulocytes 0.1 <=0.4 10e3/uL    Absolute NRBCs 0.0 10e3/uL      EKG: appears normal, NSR, normal axis, normal intervals, no acute ST/T changes c/w ischemia, no LVH by voltage criteria, Right Bundle Branch Block, unchanged from previous tracings    Revised Cardiac Risk Index (RCRI)  The patient has the following serious  cardiovascular risks for perioperative complications:   - No serious cardiac risks = 0 points     RCRI Interpretation: 0 points: Class I (very low risk - 0.4% complication rate)       Signed Electronically by: Miroslava Solorzano MD  A copy of this evaluation report is provided to the requesting physician.

## 2025-05-27 NOTE — PROGRESS NOTES
Otolaryngology Consultation    Patient: Mayte Parmar  : 1960    Chief Complaint   Patient presents with    Nose Problem     Nasal fracture         HPI:  Mayte Parmar is a 64 year old female seen today for Nasal fracture  Patient presents to ENT for concerns regarding nasal fracture.  Patient was seen in emergency room on 2025 for facial trauma after near syncopal episode.  She reportedly had a mastectomy completed prior to the surgery date.  She did have a meal but was fasting prior to this.  She reportedly became lightheaded after standing up and nearly passed out.  She was noted to have multiple contusions dried blood and no septal hematoma.  She improved symptomatically wise with 2 L of fluid.  Imaging completed including CT head and CT cervical spine.  Head CT and no CT finding of mass hemorrhage or focal area suggestive of acute infarct.  She did have multiple nasal bone fractures associated with soft tissue swelling and subcutaneous free air.  She presented today for CT facial bones.      Mayte presents for review of nasal injury.   Patient stated she was at home, and got very dizzy and walking to living room and down. She She felt immediate concern for nasal fracture. She went back to bathroom and fell again. She has not felt well since her surgery. She had increase in pulse rate/ general weakness/ dyspnea at this time. She feels elevated pulse with any exertion.  She is not sure regarding surgical procedure at this time.     She is only able to breath thru her left nasal passage.   Nose is not significant painful, unless touched.   She was given medication for clotting after her surgery. Bleeding immediately then a constant drop form her nose.   She did use nasal decongestant spray.       No prior nasal or trauma  No prior nasal surgery  No vision changes.   FINDINGS: There are comminuted fractures of the nasal bones. The tip  of the nasal bones is displaced to the left. There is a fracture  of  the nasal septum anteriorly and superiorly.     There are advanced arthritic changes seen in the temporomandibular  joints bilaterally no mandibular fracture is seen. The pterygoid  plates are intact. There is a small polyp or retention cyst seen in  the floor of the right maxillary sinus. The retropharyngeal soft  tissues are normal. The bony orbits are intact.                                                                        IMPRESSION: Fracture of the nasal bones and anterior nasal septum.     GARFIELD ORNELAS MD           Current Outpatient Rx   Medication Sig Dispense Refill    ibuprofen (ADVIL/MOTRIN) 200 MG tablet Take 200 mg by mouth every 4 hours as needed for mild pain.         Allergies: Patient has no known allergies.     Past Medical History:   Diagnosis Date    Diabetes (H)     Idiopathic chronic gout of foot without tophus, unspecified laterality 12/24/2019    Morbid obesity (H) 12/24/2019    Type 2 diabetes mellitus without complication, without long-term current use of insulin (H) 12/24/2019       Past Surgical History:   Procedure Laterality Date    BREAST BIOPSY, CORE RT/LT Right 2011    COLONOSCOPY N/A 4/25/2025    Procedure: COLONOSCOPY with polypectomy, resolution clip;  Surgeon: James Robb MD;  Location: [facility]    DILATION AND CURETTAGE  1987    missed ab    DILATION AND CURETTAGE  1997    missed ab    MAMMOGRAM - HIM SCAN  2011    MASTECTOMY Right 2011    due to infected biospy site. No cancer    PAP DIAGNOSTIC SMEAR  1990 US BREAST BIOPSY VAC ASSISTED LT Left 10/22/2024       ENT family history reviewed    Social History     Tobacco Use    Smoking status: Never     Passive exposure: Past (As a child)    Smokeless tobacco: Never   Vaping Use    Vaping status: Never Used   Substance Use Topics    Alcohol use: Never    Drug use: Never       Review of Systems  ROS: 10 point ROS neg other than the symptoms noted above in the HPI     Physical Exam  /82 (BP Location: Right  arm, Patient Position: Sitting, Cuff Size: Adult Large)   Pulse (!) 121   Temp 97.5  F (36.4  C) (Tympanic)   Resp 20   Ht 1.524 m (5')   Wt 96.6 kg (213 lb)   BMI 41.60 kg/m      General - The patient is well nourished and well developed, and appears to have good nutritional status.  Alert and oriented to person and place, answers questions and cooperates with examination appropriately.   Head and Face - Normocephalic   Facial bruising present along orbits/ nasal bridge. The facial nerve is intact, with strong symmetric movements.  Voice and Breathing - The patient was breathing comfortably without the use of accessory muscles. There was no wheezing, stridor, or stertor.  The patients voice was clear and strong, and had appropriate pitch and quality.  Ears -The external auditory canals are patent, the tympanic membranes are intact without effusion, retraction or mass.  Bony landmarks are intact.  Eyes - bruising throughout along orbits  Extraocular movements intact, and the pupils were reactive to light.  Sclera were not icteric or injected, conjunctiva were pink and moist.  Mouth - Examination of the oral cavity showed pink, healthy oral mucosa. No lesions or ulcerations noted.  The tongue was mobile and midline, and the dentition were in good condition.    Throat - The walls of the oropharynx were smooth, pink, moist, symmetric, and had no lesions or ulcerations.  The tonsillar pillars and soft palate were symmetric.  The uvula was midline on elevation.    Neck - Normal midline excursion of the laryngotracheal complex during swallowing.  Full range of motion on passive movement.  Palpation of the occipital, submental, submandibular, internal jugular chain, and supraclavicular nodes did not demonstrate any abnormal lymph nodes or masses.  Palpation of the thyroid was soft and smooth, with no nodules or goiter appreciated.  The trachea was mobile and midline.  Nose - External contour is asymmetric, edematous,  she has ecchymosis/ bruising throughout along nasal dorsum deviation, nasal tip deviation to right. Tenderness to palpation.       To further evaluate the nasal cavity, I performed rigid nasal endoscopy.  I first sprayed the nasal cavity bilaterally with a mix of lidocaine and neosynephrine.  I then began on the left side using a 2.7mm, 30 degree rigid nasal endoscope.  The septum was deflected to the Right and the nasal airway was very tight. I was able to visualize along right septum which does not appear w/ hematoma, severe obstruction due to injury.   I then turned my attention to the contralateral side. Left septum without hematoma. The middle turbinate and middle meatus were clearly visualized and without polyp or purulent secretions.   I was able to get limited look at the posterior nasal cavity and sphenoethmoid recess.  There were no excess secretions or polypoid degeneration of the mucosa.        Impression and Plan- Mayte DONTE Parmar is a 64 year old female with:      ICD-10-CM    1. Bilateral Nasal fracture  S02.2XXA lidocaine 2%-oxymetazoline 0.025% nasal solution 2 spray      2. Deviated nasal septum  J34.2       3. Closed fracture of nasal septum, initial encounter  S02.2XXA           Reviewed w/ mayte options of surgical repair vs. Observation. She has severe nasal obstruction and wishes to proceed with surgery. Proceed in surgery with Dr. Dunbar for closed reduction nasal fracture, possible septoplasty.   She was informed regarding postoperative recovery.       Risks and complications discussed include general anesthesia, bleeding, infection, septal perforation, anosmia, epiphora, CSF rhinorrhea, atrophic rhinitis, scar formation, numbness over the incision, need for additional surgery and no guarantee is made that the nasal bones will be completely straight.  Understanding is expressed, all questions are answered and wishes are to proceed with surgical intervention    Ana Maria Chan PA-C  ENT  [facility]  Clinics, [place]

## 2025-05-28 ENCOUNTER — ANESTHESIA EVENT (OUTPATIENT)
Dept: SURGERY | Facility: HOSPITAL | Age: 65
End: 2025-05-28
Payer: COMMERCIAL

## 2025-05-28 ENCOUNTER — HOSPITAL ENCOUNTER (OUTPATIENT)
Facility: HOSPITAL | Age: 65
Discharge: HOME | End: 2025-05-28
Attending: OTOLARYNGOLOGY | Admitting: OTOLARYNGOLOGY
Payer: COMMERCIAL

## 2025-05-28 ENCOUNTER — ANESTHESIA (OUTPATIENT)
Dept: SURGERY | Facility: HOSPITAL | Age: 65
End: 2025-05-28
Payer: COMMERCIAL

## 2025-05-28 ENCOUNTER — TELEPHONE (OUTPATIENT)
Dept: OTOLARYNGOLOGY | Facility: OTHER | Age: 65
End: 2025-05-28

## 2025-05-28 VITALS
RESPIRATION RATE: 16 BRPM | BODY MASS INDEX: 41.62 KG/M2 | WEIGHT: 212 LBS | HEIGHT: 60 IN | SYSTOLIC BLOOD PRESSURE: 157 MMHG | OXYGEN SATURATION: 99 % | DIASTOLIC BLOOD PRESSURE: 83 MMHG | HEART RATE: 93 BPM | TEMPERATURE: 99.4 F

## 2025-05-28 DIAGNOSIS — Z98.890 S/P NASAL SURGERY: Primary | ICD-10-CM

## 2025-05-28 PROCEDURE — 710N000012 HC RECOVERY PHASE 2, PER MINUTE: Performed by: OTOLARYNGOLOGY

## 2025-05-28 PROCEDURE — 250N000011 HC RX IP 250 OP 636: Performed by: NURSE ANESTHETIST, CERTIFIED REGISTERED

## 2025-05-28 PROCEDURE — 250N000011 HC RX IP 250 OP 636: Performed by: OTOLARYNGOLOGY

## 2025-05-28 PROCEDURE — C9046 COCAINE HCL NASAL SOLUTION: HCPCS | Performed by: OTOLARYNGOLOGY

## 2025-05-28 PROCEDURE — 250N000009 HC RX 250: Performed by: NURSE ANESTHETIST, CERTIFIED REGISTERED

## 2025-05-28 PROCEDURE — 250N000025 HC SEVOFLURANE, PER MIN: Performed by: OTOLARYNGOLOGY

## 2025-05-28 PROCEDURE — 258N000003 HC RX IP 258 OP 636: Performed by: NURSE ANESTHETIST, CERTIFIED REGISTERED

## 2025-05-28 PROCEDURE — 370N000017 HC ANESTHESIA TECHNICAL FEE, PER MIN: Performed by: OTOLARYNGOLOGY

## 2025-05-28 PROCEDURE — 250N000013 HC RX MED GY IP 250 OP 250 PS 637: Performed by: OTOLARYNGOLOGY

## 2025-05-28 PROCEDURE — 710N000010 HC RECOVERY PHASE 1, LEVEL 2, PER MIN: Performed by: OTOLARYNGOLOGY

## 2025-05-28 PROCEDURE — 272N000001 HC OR GENERAL SUPPLY STERILE: Performed by: OTOLARYNGOLOGY

## 2025-05-28 PROCEDURE — 21337 CLOSED TX SEPTAL&NOSE FX: CPT | Performed by: OTOLARYNGOLOGY

## 2025-05-28 PROCEDURE — 250N000009 HC RX 250: Performed by: OTOLARYNGOLOGY

## 2025-05-28 PROCEDURE — 360N000074 HC SURGERY LEVEL 1, PER MIN: Performed by: OTOLARYNGOLOGY

## 2025-05-28 PROCEDURE — 999N000141 HC STATISTIC PRE-PROCEDURE NURSING ASSESSMENT: Performed by: OTOLARYNGOLOGY

## 2025-05-28 RX ORDER — LIDOCAINE 40 MG/G
CREAM TOPICAL
Status: DISCONTINUED | OUTPATIENT
Start: 2025-05-28 | End: 2025-05-28 | Stop reason: HOSPADM

## 2025-05-28 RX ORDER — HYDRALAZINE HYDROCHLORIDE 20 MG/ML
2.5-5 INJECTION INTRAMUSCULAR; INTRAVENOUS EVERY 10 MIN PRN
Status: DISCONTINUED | OUTPATIENT
Start: 2025-05-28 | End: 2025-05-28 | Stop reason: HOSPADM

## 2025-05-28 RX ORDER — SODIUM CHLORIDE, SODIUM LACTATE, POTASSIUM CHLORIDE, CALCIUM CHLORIDE 600; 310; 30; 20 MG/100ML; MG/100ML; MG/100ML; MG/100ML
INJECTION, SOLUTION INTRAVENOUS CONTINUOUS
Status: DISCONTINUED | OUTPATIENT
Start: 2025-05-28 | End: 2025-05-28 | Stop reason: HOSPADM

## 2025-05-28 RX ORDER — POLYETHYLENE GLYCOL 3350 17 G/17G
17 POWDER, FOR SOLUTION ORAL DAILY
Status: DISCONTINUED | OUTPATIENT
Start: 2025-05-29 | End: 2025-05-28 | Stop reason: HOSPADM

## 2025-05-28 RX ORDER — ONDANSETRON 4 MG/1
4 TABLET, ORALLY DISINTEGRATING ORAL EVERY 6 HOURS PRN
Status: DISCONTINUED | OUTPATIENT
Start: 2025-05-28 | End: 2025-05-28 | Stop reason: HOSPADM

## 2025-05-28 RX ORDER — IBUPROFEN 200 MG
600 TABLET ORAL EVERY 6 HOURS PRN
Status: DISCONTINUED | OUTPATIENT
Start: 2025-05-28 | End: 2025-05-28 | Stop reason: HOSPADM

## 2025-05-28 RX ORDER — COCAINE HYDROCHLORIDE 40 MG/ML
SOLUTION NASAL
Status: DISCONTINUED
Start: 2025-05-28 | End: 2025-05-28 | Stop reason: HOSPADM

## 2025-05-28 RX ORDER — NALOXONE HYDROCHLORIDE 0.4 MG/ML
0.4 INJECTION, SOLUTION INTRAMUSCULAR; INTRAVENOUS; SUBCUTANEOUS
Status: DISCONTINUED | OUTPATIENT
Start: 2025-05-28 | End: 2025-05-28 | Stop reason: HOSPADM

## 2025-05-28 RX ORDER — NALOXONE HYDROCHLORIDE 0.4 MG/ML
0.1 INJECTION, SOLUTION INTRAMUSCULAR; INTRAVENOUS; SUBCUTANEOUS
Status: DISCONTINUED | OUTPATIENT
Start: 2025-05-28 | End: 2025-05-28 | Stop reason: HOSPADM

## 2025-05-28 RX ORDER — NALOXONE HYDROCHLORIDE 0.4 MG/ML
0.2 INJECTION, SOLUTION INTRAMUSCULAR; INTRAVENOUS; SUBCUTANEOUS
Status: DISCONTINUED | OUTPATIENT
Start: 2025-05-28 | End: 2025-05-28 | Stop reason: HOSPADM

## 2025-05-28 RX ORDER — ONDANSETRON 4 MG/1
4 TABLET, ORALLY DISINTEGRATING ORAL EVERY 30 MIN PRN
Status: DISCONTINUED | OUTPATIENT
Start: 2025-05-28 | End: 2025-05-28 | Stop reason: HOSPADM

## 2025-05-28 RX ORDER — AMOXICILLIN 250 MG
1 CAPSULE ORAL 2 TIMES DAILY
Status: DISCONTINUED | OUTPATIENT
Start: 2025-05-28 | End: 2025-05-28 | Stop reason: HOSPADM

## 2025-05-28 RX ORDER — COCAINE HYDROCHLORIDE 40 MG/ML
SOLUTION NASAL PRN
Status: DISCONTINUED | OUTPATIENT
Start: 2025-05-28 | End: 2025-05-28 | Stop reason: HOSPADM

## 2025-05-28 RX ORDER — ONDANSETRON 2 MG/ML
4 INJECTION INTRAMUSCULAR; INTRAVENOUS EVERY 30 MIN PRN
Status: DISCONTINUED | OUTPATIENT
Start: 2025-05-28 | End: 2025-05-28 | Stop reason: HOSPADM

## 2025-05-28 RX ORDER — PROPOFOL 10 MG/ML
INJECTION, EMULSION INTRAVENOUS PRN
Status: DISCONTINUED | OUTPATIENT
Start: 2025-05-28 | End: 2025-05-28

## 2025-05-28 RX ORDER — FENTANYL CITRATE 50 UG/ML
INJECTION, SOLUTION INTRAMUSCULAR; INTRAVENOUS PRN
Status: DISCONTINUED | OUTPATIENT
Start: 2025-05-28 | End: 2025-05-28

## 2025-05-28 RX ORDER — ONDANSETRON 2 MG/ML
4 INJECTION INTRAMUSCULAR; INTRAVENOUS EVERY 6 HOURS PRN
Status: DISCONTINUED | OUTPATIENT
Start: 2025-05-28 | End: 2025-05-28 | Stop reason: HOSPADM

## 2025-05-28 RX ORDER — OXYMETAZOLINE HYDROCHLORIDE 0.05 G/100ML
2 SPRAY NASAL
Status: COMPLETED | OUTPATIENT
Start: 2025-05-28 | End: 2025-05-28

## 2025-05-28 RX ORDER — PREDNISONE 20 MG/1
TABLET ORAL
Qty: 4 TABLET | Refills: 0 | Status: SHIPPED | OUTPATIENT
Start: 2025-05-29

## 2025-05-28 RX ORDER — LABETALOL HYDROCHLORIDE 5 MG/ML
10 INJECTION, SOLUTION INTRAVENOUS
Status: DISCONTINUED | OUTPATIENT
Start: 2025-05-28 | End: 2025-05-28 | Stop reason: HOSPADM

## 2025-05-28 RX ORDER — BISACODYL 10 MG
10 SUPPOSITORY, RECTAL RECTAL DAILY PRN
Status: DISCONTINUED | OUTPATIENT
Start: 2025-05-31 | End: 2025-05-28 | Stop reason: HOSPADM

## 2025-05-28 RX ORDER — FENTANYL CITRATE 50 UG/ML
50 INJECTION, SOLUTION INTRAMUSCULAR; INTRAVENOUS EVERY 5 MIN PRN
Status: DISCONTINUED | OUTPATIENT
Start: 2025-05-28 | End: 2025-05-28 | Stop reason: HOSPADM

## 2025-05-28 RX ORDER — DEXAMETHASONE SODIUM PHOSPHATE 4 MG/ML
4 INJECTION, SOLUTION INTRA-ARTICULAR; INTRALESIONAL; INTRAMUSCULAR; INTRAVENOUS; SOFT TISSUE
Status: DISCONTINUED | OUTPATIENT
Start: 2025-05-28 | End: 2025-05-28 | Stop reason: HOSPADM

## 2025-05-28 RX ORDER — LIDOCAINE HYDROCHLORIDE AND EPINEPHRINE 10; 10 MG/ML; UG/ML
INJECTION, SOLUTION INFILTRATION; PERINEURAL PRN
Status: DISCONTINUED | OUTPATIENT
Start: 2025-05-28 | End: 2025-05-28 | Stop reason: HOSPADM

## 2025-05-28 RX ORDER — ONDANSETRON 2 MG/ML
INJECTION INTRAMUSCULAR; INTRAVENOUS PRN
Status: DISCONTINUED | OUTPATIENT
Start: 2025-05-28 | End: 2025-05-28

## 2025-05-28 RX ORDER — DEXAMETHASONE SODIUM PHOSPHATE 4 MG/ML
INJECTION, SOLUTION INTRA-ARTICULAR; INTRALESIONAL; INTRAMUSCULAR; INTRAVENOUS; SOFT TISSUE PRN
Status: DISCONTINUED | OUTPATIENT
Start: 2025-05-28 | End: 2025-05-28

## 2025-05-28 RX ORDER — OXYCODONE HYDROCHLORIDE 5 MG/1
5 TABLET ORAL EVERY 4 HOURS PRN
Status: DISCONTINUED | OUTPATIENT
Start: 2025-05-28 | End: 2025-05-28 | Stop reason: HOSPADM

## 2025-05-28 RX ORDER — ACETAMINOPHEN 325 MG/1
975 TABLET ORAL EVERY 8 HOURS
Status: DISCONTINUED | OUTPATIENT
Start: 2025-05-28 | End: 2025-05-28 | Stop reason: HOSPADM

## 2025-05-28 RX ORDER — PROCHLORPERAZINE MALEATE 5 MG/1
5 TABLET ORAL EVERY 6 HOURS PRN
Status: DISCONTINUED | OUTPATIENT
Start: 2025-05-28 | End: 2025-05-28 | Stop reason: HOSPADM

## 2025-05-28 RX ORDER — LIDOCAINE HYDROCHLORIDE 20 MG/ML
INJECTION, SOLUTION INFILTRATION; PERINEURAL PRN
Status: DISCONTINUED | OUTPATIENT
Start: 2025-05-28 | End: 2025-05-28

## 2025-05-28 RX ORDER — OXYCODONE HYDROCHLORIDE 5 MG/1
10 TABLET ORAL EVERY 4 HOURS PRN
Status: DISCONTINUED | OUTPATIENT
Start: 2025-05-28 | End: 2025-05-28 | Stop reason: HOSPADM

## 2025-05-28 RX ORDER — HYDROMORPHONE HYDROCHLORIDE 1 MG/ML
0.5 INJECTION, SOLUTION INTRAMUSCULAR; INTRAVENOUS; SUBCUTANEOUS EVERY 5 MIN PRN
Status: DISCONTINUED | OUTPATIENT
Start: 2025-05-28 | End: 2025-05-28 | Stop reason: HOSPADM

## 2025-05-28 RX ORDER — ONDANSETRON 4 MG/1
TABLET, ORALLY DISINTEGRATING ORAL
Status: COMPLETED
Start: 2025-05-28 | End: 2025-05-28

## 2025-05-28 RX ADMIN — DEXAMETHASONE SODIUM PHOSPHATE 12 MG: 4 INJECTION, SOLUTION INTRA-ARTICULAR; INTRALESIONAL; INTRAMUSCULAR; INTRAVENOUS; SOFT TISSUE at 14:29

## 2025-05-28 RX ADMIN — MIDAZOLAM 2 MG: 1 INJECTION INTRAMUSCULAR; INTRAVENOUS at 14:22

## 2025-05-28 RX ADMIN — Medication 200 MG: at 14:45

## 2025-05-28 RX ADMIN — ACETAMINOPHEN 975 MG: 325 TABLET, FILM COATED ORAL at 16:23

## 2025-05-28 RX ADMIN — DEXMEDETOMIDINE HYDROCHLORIDE 4 MCG: 100 INJECTION, SOLUTION INTRAVENOUS at 14:30

## 2025-05-28 RX ADMIN — ROCURONIUM BROMIDE 40 MG: 10 INJECTION INTRAVENOUS at 14:34

## 2025-05-28 RX ADMIN — Medication 100 MG: at 14:23

## 2025-05-28 RX ADMIN — ONDANSETRON 4 MG: 4 TABLET, ORALLY DISINTEGRATING ORAL at 16:15

## 2025-05-28 RX ADMIN — OXYMETAZOLINE HYDROCHLORIDE 2 SPRAY: 0.05 SOLUTION NASAL at 12:49

## 2025-05-28 RX ADMIN — SODIUM CHLORIDE, SODIUM LACTATE, POTASSIUM CHLORIDE, AND CALCIUM CHLORIDE: .6; .31; .03; .02 INJECTION, SOLUTION INTRAVENOUS at 12:43

## 2025-05-28 RX ADMIN — LIDOCAINE HYDROCHLORIDE 40 MG: 20 INJECTION, SOLUTION INFILTRATION; PERINEURAL at 14:23

## 2025-05-28 RX ADMIN — ONDANSETRON 4 MG: 2 INJECTION INTRAMUSCULAR; INTRAVENOUS at 14:38

## 2025-05-28 RX ADMIN — FENTANYL CITRATE 50 MCG: 50 INJECTION INTRAMUSCULAR; INTRAVENOUS at 14:36

## 2025-05-28 RX ADMIN — PROPOFOL 150 MG: 10 INJECTION, EMULSION INTRAVENOUS at 14:23

## 2025-05-28 RX ADMIN — SODIUM CHLORIDE, SODIUM LACTATE, POTASSIUM CHLORIDE, AND CALCIUM CHLORIDE: .6; .31; .03; .02 INJECTION, SOLUTION INTRAVENOUS at 14:23

## 2025-05-28 RX ADMIN — FENTANYL CITRATE 50 MCG: 50 INJECTION INTRAMUSCULAR; INTRAVENOUS at 14:23

## 2025-05-28 RX ADMIN — OXYMETAZOLINE HYDROCHLORIDE 2 SPRAY: 0.05 SOLUTION NASAL at 12:19

## 2025-05-28 RX ADMIN — OXYMETAZOLINE HYDROCHLORIDE 2 SPRAY: 0.05 SOLUTION NASAL at 12:39

## 2025-05-28 ASSESSMENT — ACTIVITIES OF DAILY LIVING (ADL)
ADLS_ACUITY_SCORE: 44
ADLS_ACUITY_SCORE: 42
ADLS_ACUITY_SCORE: 42
ADLS_ACUITY_SCORE: 41
ADLS_ACUITY_SCORE: 42

## 2025-05-28 ASSESSMENT — LIFESTYLE VARIABLES: TOBACCO_USE: 0

## 2025-05-28 NOTE — ANESTHESIA POSTPROCEDURE EVALUATION
Patient: Mayte Parmar    Procedure: Procedure(s):  CLOSED REDUCTION NASAL FRACTURE       Anesthesia Type:  General    Note:  Disposition: Outpatient   Postop Pain Control: Uneventful            Sign Out: Well controlled pain   PONV: No   Neuro/Psych: Uneventful            Sign Out: Acceptable/Baseline neuro status   Airway/Respiratory: Uneventful            Sign Out: Acceptable/Baseline resp. status   CV/Hemodynamics: Uneventful            Sign Out: Acceptable CV status; No obvious hypovolemia; No obvious fluid overload   Other NRE: NONE   DID A NON-ROUTINE EVENT OCCUR? No       Last vitals:  Vitals Value Taken Time   /89 05/28/25 15:30   Temp 99.4  F (37.4  C) 05/28/25 15:30   Pulse 84 05/28/25 15:30   Resp 13 05/28/25 15:30   SpO2 95 % 05/28/25 15:30   Vitals shown include unfiled device data.    Electronically Signed By: JOSE JUAN Murphy CRNA  May 28, 2025  3:46 PM

## 2025-05-28 NOTE — ANESTHESIA PROCEDURE NOTES
Airway         Procedure Start/Stop Times: 5/28/2025 2:25 PM  Staff -        CRNA: David Fox APRN CRNA       Other Anesthesia Staff: Josette Garcia       Performed By: CRNA and SRNA  Consent for Airway        Urgency: elective  Indications and Patient Condition       Indications for airway management: airway protection       Induction type:intravenous       Mask difficulty assessment: 0 - not attempted    Final Airway Details       Final airway type: endotracheal airway       Successful airway: ETT - single and Oral  Endotracheal Airway Details        ETT size (mm): 7.0       Successful intubation technique: video laryngoscopy       VL Blade Size: Glidescope 3       Grade View of Cords: 1       Adjucts: stylet       Position: Left       Measured from: lips       Secured at (cm): 22       Bite block used: None    Post intubation assessment        Placement verified by: capnometry and equal breath sounds        Number of attempts at approach: 1       Secured with: tape       Ease of procedure: easy    Medication(s) Administered   Medication Administration Time: 5/28/2025 2:25 PM

## 2025-05-28 NOTE — ANESTHESIA CARE TRANSFER NOTE
Patient: Mayte Parmar    Procedure: Procedure(s):  CLOSED REDUCTION NASAL FRACTURE       Diagnosis: Closed fracture nasal bone [S02.2XXA]  Diagnosis Additional Information: No value filed.    Anesthesia Type:   General     Note:    Oropharynx: oropharynx clear of all foreign objects  Level of Consciousness: drowsy  Oxygen Supplementation: room air    Independent Airway: airway patency satisfactory and stable  Dentition: dentition unchanged  Vital Signs Stable: post-procedure vital signs reviewed and stable  Report to RN Given: handoff report given  Patient transferred to: PACU    Handoff Report: Identifed the Patient, Identified the Reponsible Provider, Reviewed the pertinent medical history, Discussed the surgical course, Reviewed Intra-OP anesthesia mangement and issues during anesthesia, Set expectations for post-procedure period and Allowed opportunity for questions and acknowledgement of understanding  Vitals:  Vitals Value Taken Time   BP     Temp     Pulse     Resp     SpO2         Electronically Signed By: JOSE JUAN Zhu CRNA  May 28, 2025  2:56 PM

## 2025-05-28 NOTE — OR NURSING
"Patient requesting to use the bathroom.  Patient assisted up and WC to bathroom with staff x 2.  Patient moved well.  Patient was able to void; but noted that she was nauseated and that she had a headache.  Patient unsure if she initially wanted anything for the pain/nausea but when she got back to bed; requesting something.  Patient with noticeable shaking; notes that she feels \"bone cold\" warm blankets on.  "

## 2025-05-28 NOTE — DISCHARGE INSTRUCTIONS
Instructions for Nasal Fracture Repair    Recovery - Repair of a broken nose requires re-breaking and movement of the fragments back into correct position during the surgery. This causes discomfort in the face and around the eyes, along with a headache. Also, everyone recovers differently from a general anesthetic. Symptoms such as fatigue, nausea, lightheadedness, and sometimes a low grade fever (up to 100 degrees) are not unusual. As your body removes the anesthetic drugs from circulation, these symptoms will resolve. Your throat will be sore for several days, and possibly up to a week after surgery.  Sleep with your head elevated 30 degrees to help with pain and swelling.  Ice around the nose keeping your cast dry if you feel this is helpful for pain control.  Icing will be easier once the cast is removed.    Medications - Resume all your previous home medications after surgery. You were sent home with narcotic pain medication and antibiotics. If you can tolerate the discomfort during your recovery by using just plain Tylenol or ibuprofen (advil), please do so. However, do not hesitate to use the stronger pain medication if needed. If you were sent home with an antibiotic, it is primarily used to help the healing process. If it causes loose bowel movements or other signs of intolerance, it is appropriate to discontinue it. By far the most important measure you can take to give the best possible chance of repair is not to rub/touch/bump the nose. Dab your nose for drainage, do not blow it.  Use nasal oxymetazoline (afrin) 2 sprays to each nostril twice a day x 2 days.  After 2 days stop afrin and use ocean nasal spray 4-5 times daily for 1 month.  You may start the ocean spray immediately postop just do not rinse out the oxymetazoline.    Activity - Please avoid any contact sports, vigorous physical activity, or lifting greater than 20 pounds for 2 weeks, or otherwise indicated at your postoperative visit.  Sleep  with your head elevated, or in a reclining chair for at least three days after surgery. There are no restrictions on diet after this surgery.   Leave nasal splint/cast in place and keep dry.  May shower normally but keep cast dry.      Complications - Problems related to nasal fracture surgery are rare. If you experience fevers greater than 100.5, or change in vision, you should call the office right away.     Follow-up - The follow up is approximately one week, and will consist of removing all of the splints and packing from the nose. This is not a painful visit, and the relief of congestion and facial pressure will be immediate. Keep in mind that even after the follow up visit, it is very important not to bump or rub the nose hard for an entire month after surgery. The nasal bones are paper-thin and can easily become moved as they heal.   Please arrange follow up with Ana Maria Chan ENT P.A. or Eleanor Segura NP in 1 week prior to discharge.    If there are any questions or issues with the above, or if there are other issues that concern you, always feel free to call the clinic at [phone]. If there is an emergency after hours, call the ENT on call [phone].    Elayne Dunbar D.O.  Otolaryngology/Head and Neck Surgery  Allergy    After Anesthesia (Sleep Medicine)  What should I do after anesthesia?  You should rest and relax for the next 24 hours. Avoid risky or difficult (strenuous) activity. A responsible adult should stay with you overnight.  Don't drive or use any heavy equipment for 24 hours. Even if you feel normal, your reactions may be affected by the sleep medicine given to you.  Don't drink alcohol or make any important decisions for 24 hours.  Slowly get back to your regular diet, as you feel able.  How should I expect to feel?  It's normal to feel dizzy, light-headed, or faint for up to a full day after anesthesia or while taking pain medicine. If this happens:   Sit down for a few minutes  before standing.  Have someone help you when you get up to walk or use the bathroom.  If you have nausea (feel sick to your stomach) or vomit (throw up):   Drink clear liquids (such as apple juice, ginger ale, broth, or 7UP) until you feel better.  If you feel sick to your stomach, or you keep vomiting for 24 hours, please call the doctor.  What else should I know?  You might have a dry mouth, sore throat, muscle aches, or trouble sleeping. These should go away after 24 hours.  Please contact your doctor if you have any other symptoms that concern you, such as fever, pain, bleeding, fluid drainage, swelling, or headache, or if it's been over 8 to 10 hours and you still aren't able to pee (urinate).  If you have a history of sleep apnea, it's very important to use your CPAP machine for the next 24 hours when you nap or sleep.   For informational purposes only. Not to replace the advice of your health care provider. Copyright   2023 [facility]. All rights reserved. Clinically reviewed by Tony Heard MD. myRet[employer] [id number] - REV 09/23.

## 2025-05-28 NOTE — OP NOTE
PREOPERATIVE DIAGNOSIS: 1. Closed nasal fracture.   2.  Nasal septal fracture  POSTOPERATIVE DIAGNOSIS: Closed nasal fracture.   PROCEDURE:    Closed reduction of nasal bone fracture.   Closed reduction nasal septal fracture  SURGEON: Elayne Dunbar D.O.  BLOOD LOSS: 5 mL.   COMPLICATIONS: None.   SPECIMENS: None.   ANESTHESIA: GETA.   INDICATIONS: Mayte Parmar sustained a nasal fracture during a syncopal like episode. Examination demonstrated significant deformity of the nose as well as collapse of the internal nasal valve and nasal obstruction. Therefore, my recommendation was for closed reduction of the nasal fracture. Preoperatively, risks discussed included the risks of infection, bleeding, the risks of general anesthesia, the possibility of less than ideal cosmetic outcome, and even the possibility of failure of the surgery to resolve the nasal obstruction and issues brought on by the trauma. They understood and wished to proceed.   OPERATIVE PROCEDURE: After being taken to the operating room and induction of general endotracheal tube anesthesia, the bed was rotated 90 degrees. I began by injecting local anesthetic in between the superficial surface of the nasal bones and the subcutaneous soft tissues bilaterally. After waiting 5 minutes, I then applied topical anesthetic to both sides of the nose in the form of 2 cottonoids on each side which had been soaked with a total of 4 mL of 4% liquid cocaine. After the nose was well anesthetized, I then entered the nasal cavity with a nasal speculum.  There is a deviated nasal septum to the right with 90% obstruction and abrasions at the left nasal mucosa.  I bluntly displaced the septum toward the midline the septum is intact.  I then entered the left nares with a [place] elevator. By palpation, I could feel that I was underneath the collapsed portion of the left nasal bone and deflected it outward to reduce the fracture. This immediately restored the normal  symmetric cosmetic appearance of the nose.  The dorsum is midline.  Now that I had reduced the nasal fracture, I then added support to the nose by placing 2 Carpenter splints, one on each side of the nose.   I held them in place by passing a silk stitch through the Carpenter splint, then through the nasal septum.   I reexamined the nose and there was good hemostasis and externally it was smooth and symmetric. I then placed a [place] splint on the dorsum of the nose per 's recommendations, and the procedure was complete.   The patient was awakened, extubated and sent to the recovery room in good condition.

## 2025-05-28 NOTE — TELEPHONE ENCOUNTER
----- Message from Elayne Dunbar sent at 5/28/2025  2:10 PM CDT -----  Regarding: contact breast surgeon office  Mayte had left breast surgery with Dr. Pineda on 5/23/2025 at [facility].  Please let Dr. Pineda know I needed to start Mayte on prednisone 20 mg x 3 days, 10 mg x 2 days, s/p nasal fracture with repair.   If Dr. Pineda does not want prednisone, have her contact patient.  Minimally, I would like 2 days post op pred  Thanks!

## 2025-05-28 NOTE — OR NURSING
Patient and responsible adult given discharge instructions with no questions regarding instructions. Trip score 19/20. Pain level 2/10.  Discharged from unit via wheelchair. Patient discharged to home with family. Unable to make follow up appointment for pt as clinic staff gone for the day. Instructed pt and family to call in to clinic with phone number provided to schedule follow up if they have not heard back from the clinic by the afternoon tomorrow 5/29. Pt and family verbalize understanding.

## 2025-05-28 NOTE — TELEPHONE ENCOUNTER
This nurse called [facility] in [place]; however, Dr. Pineda is not there today. She is working at [facility], so called there instead. Dr. Pineda is out until Friday. A message was sent to the surgery team to request a call back to let us know if Prednisone is okay for the patient to start now. The  assured this nurse that the team would be calling back today.

## 2025-05-28 NOTE — PROGRESS NOTES
Otolaryngology Progress Note      Mayte Parmar is a 64 year old female     Status post breast surgery on with Dr. Pineda on 5/23/2025 with a syncopal episode on 5/23 resulting in bilateral nasal bone fractures.  She had been fasting for surgery but did have a meal prior to the injury.  she describes standing up and feeling lightheaded, falling and hitting her nose against the windowsill.  She is safe at home.     No known prior history of nasal trauma nor prior nasal surgery.  Immediate epistaxis which resolved.  No prior history of chronic nasal obstruction.    She has noted right nasal obstruction and a change in the appearance of the nose since the injury.  She was evaluatedby Dr. Dutton in the ED on 5/24 and by Ana Maria on 5/27/2025    CT facial bones dated 5/27/2025 shows a left lateral nasal wall with depressed comminuted nasal bone fracture right minimally displaced comminuted nasal bone fractures.  Anterior and mid septal fractures with deviation right.  Entire dorsum deviated left.  No orbital fractures noted.  Sinuses are grossly clear    Physical Exam  BP (!) 147/93   Pulse 94   Temp 99.7  F (37.6  C) (Tympanic)   Resp 20   Ht 1.524 m (5')   Wt 96.2 kg (212 lb)   SpO2 94%   BMI 41.40 kg/m      General - The patient is well nourished and well developed, and appears to have good nutritional status.  Alert and oriented to person and place, interactive.  Head and Face - Normocephalic and atraumatic, with no gross asymmetry noted of the contour of the facial features.  The facial nerve is intact, with strong symmetric movements.  Eyes - Extraocular movements intact.  Bilateral infraorbital ecchymosis.  No step-off fracture to infraorbital rims  Ears- External auditory canals are patent  Nose -ecchymosis dorsum with deviation dorsum to the left.  Tender to palpation no crepitus or palpable montserrat step-off fracture.  No clear rhinorrhea no telecanthus.  No polyps, masses, or purulence noted on examination.   No septal hematoma.  DNS right    Impression/Plan  Mayte Parmar is a 64 year old female  Bilateral nasal bone fractures  Nasal septal fracture  Deviated nasal septum    The risks and complications of closed reduction nasal fracture, possible septoplasty were discussed include general anesthesia, bleeding, infection, septal perforation, anosmia, epiphora, CSF rhinorrhea, atrophic rhinitis, scar formation, numbness over the incision, need for additional surgery and no guarantee is made that the nasal bones will be completely straight.  Understanding is expressed, all questions are answered and wishes are to proceed with surgical intervention      Elayne Dunbar D.O.  Otolaryngology/Head and Neck Surgery  Allergy

## 2025-05-28 NOTE — OR NURSING
Face to face report given to Rohan BLANC.  Family remains at bedside.  Patient notes that nausea is improved.

## 2025-05-28 NOTE — ANESTHESIA PREPROCEDURE EVALUATION
Anesthesia Pre-Procedure Evaluation    Patient: Mayte Parmar   MRN: [id number] : 1960          Procedure : Procedure(s):  CLOSED REDUCTION NASAL FRACTURE, POSSIBLE SEPTOPLASTY         Past Medical History:   Diagnosis Date    Diabetes (H)     Idiopathic chronic gout of foot without tophus, unspecified laterality 2019    Morbid obesity (H) 2019    Type 2 diabetes mellitus without complication, without long-term current use of insulin (H) 2019      Past Surgical History:   Procedure Laterality Date    BREAST BIOPSY, CORE RT/LT Right     COLONOSCOPY N/A 2025    Procedure: COLONOSCOPY with polypectomy, resolution clip;  Surgeon: James Robb MD;  Location: [facility]    DILATION AND CURETTAGE      missed ab    DILATION AND CURETTAGE      missed ab    MAMMOGRAM - HIM SCAN      MASTECTOMY Right     due to infected biospy site. No cancer    PAP DIAGNOSTIC SMEAR  1990 BREAST BIOPSY VAC ASSISTED LT Left 10/22/2024      No Known Allergies   Social History     Tobacco Use    Smoking status: Never     Passive exposure: Past (As a child)    Smokeless tobacco: Never   Substance Use Topics    Alcohol use: Never      Wt Readings from Last 1 Encounters:   25 96.6 kg (213 lb)        Anesthesia Evaluation   Pt has had prior anesthetic. Type: MAC and General.        ROS/MED HX  ENT/Pulmonary:     (+)     JASPAL risk factors,   obese,                             (-) tobacco use   Neurologic:       Cardiovascular:     (+)  - -   -  - -      CHF etiology: grade I diastolic dysfunction      fainting (syncope).             Irregular Heartbeat/Palpitations,       Previous cardiac testing   Echo: Date: 2024 Results:  No pericardial effusion is present.  Global and regional left ventricular function is normal with an EF of 60-65%.  Grade I or early diastolic dysfunction.  Global right ventricular function is normal.  Both atria appear normal.  Trace mitral insufficiency is  present.  No aortic stenosis is present.  Mild tricuspid insufficiency is present    Stress Test:  Date: Results:    ECG Reviewed:  Date: preop Results:    appears normal, NSR, normal axis, normal intervals, no acute ST/T changes c/w ischemia, no LVH by voltage criteria, Right Bundle Branch Block, unchanged from previous tracings  Cath:  Date: Results:      METS/Exercise Tolerance: 4 - Raking leaves, gardening    Hematologic:       Musculoskeletal:       GI/Hepatic:       Renal/Genitourinary:     (+)       Nephrolithiasis ,       Endo: Comment: Diet controlled    (+)  type II DM,   Not using insulin, - not using insulin pump.         Obesity,       Psychiatric/Substance Use:       Infectious Disease:       Malignancy:       Other: Comment: L breast reduction surgery with Dr. Pineda on 5/23.  Unfortunately a few hours after her surgery, upon her return home, she had a near syncopal episode and sustained significant facial trauma.   Per the patient, she became dizzy and lightheaded upon standing up at home and nearly lost consciousness.  She presents to the ED of 5/23 and there was concern for dehydration with BUN/Cr ratio. She was given IVF with good response.             Physical Exam  Airway  Mallampati: III  TM distance: <3 FB  Neck ROM: full  Mouth opening: >= 4 cm    Cardiovascular   Rhythm: regular     Dental   (+) Minor Abnormalities - some fillings, tiny chips        Pulmonary Breath sounds clear to auscultation        Neurological   She appears oriented x3.    Other Findings       OUTSIDE LABS:  CBC:   Lab Results   Component Value Date    WBC 8.6 05/27/2025    WBC 16.3 (H) 05/23/2025    HGB 10.2 (L) 05/27/2025    HGB 12.3 05/23/2025    HCT 31.0 (L) 05/27/2025    HCT 37.9 05/23/2025     05/27/2025     05/23/2025     BMP:   Lab Results   Component Value Date     05/27/2025     05/23/2025    POTASSIUM 3.8 05/27/2025    POTASSIUM 4.0 05/23/2025    CHLORIDE 100 05/27/2025    CHLORIDE  "101 05/23/2025    CO2 24 05/27/2025    CO2 20 (L) 05/23/2025    BUN 13.3 05/27/2025    BUN 24.3 (H) 05/23/2025    CR 0.70 05/27/2025    CR 0.85 05/23/2025     (H) 05/27/2025     (H) 05/23/2025     COAGS:   Lab Results   Component Value Date    INR 0.99 05/23/2025     POC: No results found for: \"BGM\", \"HCG\", \"HCGS\"  HEPATIC:   Lab Results   Component Value Date    ALBUMIN 3.8 05/23/2025    PROTTOTAL 6.3 (L) 05/23/2025    ALT 20 05/23/2025    AST 23 05/23/2025    ALKPHOS 62 05/23/2025    BILITOTAL 0.5 05/23/2025     OTHER:   Lab Results   Component Value Date    A1C 5.2 05/14/2025    AZRA 9.8 05/27/2025    TSH 2.33 06/21/2024    SED 15 02/27/2023       Anesthesia Plan    ASA Status:  3      NPO Status: NPO Appropriate   Anesthesia Type: General.  Airway: oral.  Induction: intravenous.   Techniques and Equipment:       - Monitoring Plan: standard ASA monitoring     Consents    Anesthesia Plan(s) and associated risks, benefits, and realistic alternatives discussed. Questions answered and patient/representative(s) expressed understanding.     - Discussed: CRNA     - Discussed with:  Patient          Blood Consent:      - Discussed with: not discussed.     Postoperative Care    Pain management: plan for postoperative opioid use.     Comments:    Other Comments: Jeanine  5/27/25               JOSE JUAN Caraballo CRNA    I have reviewed the pertinent notes and labs in the chart from the past 30 days and (re)examined the patient.  Any updates or changes from those notes are reflected in this note.    Clinically Significant Risk Factors Present on Admission                        # Anemia: based on hgb <11       # Morbid Obesity: Estimated body mass index is 41.6 kg/m  as calculated from the following:    Height as of 5/27/25: 1.524 m (5').    Weight as of 5/27/25: 96.6 kg (213 lb).                    "

## 2025-06-04 ENCOUNTER — OFFICE VISIT (OUTPATIENT)
Dept: OTOLARYNGOLOGY | Facility: OTHER | Age: 65
End: 2025-06-04
Attending: NURSE PRACTITIONER
Payer: COMMERCIAL

## 2025-06-04 VITALS
WEIGHT: 204 LBS | RESPIRATION RATE: 18 BRPM | TEMPERATURE: 98.4 F | SYSTOLIC BLOOD PRESSURE: 114 MMHG | HEART RATE: 112 BPM | BODY MASS INDEX: 40.05 KG/M2 | DIASTOLIC BLOOD PRESSURE: 68 MMHG | OXYGEN SATURATION: 98 % | HEIGHT: 60 IN

## 2025-06-04 DIAGNOSIS — S02.2XXD CLOSED FRACTURE OF NASAL BONE WITH ROUTINE HEALING, SUBSEQUENT ENCOUNTER: Primary | ICD-10-CM

## 2025-06-04 PROCEDURE — 99024 POSTOP FOLLOW-UP VISIT: CPT | Performed by: NURSE PRACTITIONER

## 2025-06-04 PROCEDURE — 3078F DIAST BP <80 MM HG: CPT | Performed by: NURSE PRACTITIONER

## 2025-06-04 PROCEDURE — 1125F AMNT PAIN NOTED PAIN PRSNT: CPT | Performed by: NURSE PRACTITIONER

## 2025-06-04 PROCEDURE — 3074F SYST BP LT 130 MM HG: CPT | Performed by: NURSE PRACTITIONER

## 2025-06-04 PROCEDURE — G0463 HOSPITAL OUTPT CLINIC VISIT: HCPCS

## 2025-06-04 ASSESSMENT — PAIN SCALES - GENERAL: PAINLEVEL_OUTOF10: MILD PAIN (2)

## 2025-06-04 NOTE — PATIENT INSTRUCTIONS
Uli Med Nasal Saline  Use high volume uli med saline irrigation.  Use warm distilled water and 2 packets of the salt solution that comes with the bottle, dissolve in bottle up to 240 ml nolan.  Irrigate each side of your nose leaning over the sink, using 1/3 to 1/2 the volume of the bottle in each nostril every irrigation.  Irrigate 5 times daily and as needed.      Thank you for allowing Ashley Segura and our ENT team to participate in your care.  If your medications are too expensive, please give the nurse a call.  We can possibly change this medication.  If you have a scheduling or an appointment question please contact our Health Unit Coordinator at their direct line [phone]  ALL nursing questions or concerns can be directed to your ENT nurse at: [phone] - Adj

## 2025-06-04 NOTE — LETTER
6/4/2025      Mayte Parmar  [address]   [place] [address]      Dear Colleague,    Thank you for referring your patient, Mayte Parmar, to the [facility]. Please see a copy of my visit note below.    Otolaryngology Note         Chief Complaint:     Patient presents with:  Surgical Followup: S/P 05/28 Nasal Fracture            History of Present Illness:     Mayte Parmar is a 64 year old female seen today for routine, removal of Carpenter splints, follow-up status post closed reduction of nasal fracture completed on 5/28/2025 by Dr. Dunbar.  She has overall been doing okay.  No heavy bleeding.  Pain is well-controlled with oral analgesics.  No nasal obstruction.    PREOPERATIVE DIAGNOSIS: 1. Closed nasal fracture.   2.  Nasal septal fracture  POSTOPERATIVE DIAGNOSIS: Closed nasal fracture.   PROCEDURE:    Closed reduction of nasal bone fracture.   Closed reduction nasal septal fracture  SURGEON: Elayne Dunbar D.O.  BLOOD LOSS: 5 mL.   COMPLICATIONS: None.          Medications:     Current Outpatient Rx   Medication Sig Dispense Refill     HYDROcodone-acetaminophen (NORCO) 7.5-325 MG per tablet        sodium chloride (OCEAN) 0.65 % nasal spray Spray 2 sprays in nostril 5 times daily. 480 mL prn     ibuprofen (ADVIL/MOTRIN) 200 MG tablet Take 200 mg by mouth every 4 hours as needed for mild pain. (Patient not taking: Reported on 6/4/2025)       ondansetron (ZOFRAN ODT) 4 MG ODT tab  (Patient not taking: Reported on 6/4/2025)       predniSONE (DELTASONE) 20 MG tablet 20 mg in the morning for days 1-3, then 10 mg (half tab) days 4 and 5 (Patient not taking: Reported on 6/4/2025) 4 tablet 0            Allergies:     Allergies: Patient has no known allergies.          Past Medical History:     Past Medical History:   Diagnosis Date     Diabetes (H)      Idiopathic chronic gout of foot without tophus, unspecified laterality 12/24/2019     Morbid obesity (H) 12/24/2019     Type 2  diabetes mellitus without complication, without long-term current use of insulin (H) 12/24/2019            Past Surgical History:     Past Surgical History:   Procedure Laterality Date     BREAST BIOPSY, CORE RT/LT Right 2011     CLOSED REDUCTION NOSE N/A 5/28/2025    Procedure: CLOSED REDUCTION NASAL FRACTURE;  Surgeon: Elayne Dunbar MD;  Location: [facility]     COLONOSCOPY N/A 4/25/2025    Procedure: COLONOSCOPY with polypectomy, resolution clip;  Surgeon: James Robb MD;  Location: [facility]     DILATION AND CURETTAGE  1987    missed ab     DILATION AND CURETTAGE  1997    missed ab     MAMMOGRAM - HIM SCAN  2011     MASTECTOMY Right 2011    due to infected biospy site. No cancer     PAP DIAGNOSTIC SMEAR  1990 US BREAST BIOPSY VAC ASSISTED LT Left 10/22/2024       ENT family history reviewed         Social History:     Social History     Tobacco Use     Smoking status: Never     Passive exposure: Past (As a child)     Smokeless tobacco: Never   Vaping Use     Vaping status: Never Used   Substance Use Topics     Alcohol use: Never     Drug use: Never            Review of Systems:     ROS: See HPI         Physical Exam:     /68 (BP Location: Right arm, Patient Position: Sitting, Cuff Size: Adult Regular)   Pulse 112   Temp 98.4  F (36.9  C) (Tympanic)   Resp 18   Ht 1.524 m (5')   Wt 92.5 kg (204 lb)   SpO2 98%   BMI 39.84 kg/m      General - The patient is well nourished and well developed, and appears to have good nutritional status.  Alert and oriented to person and place, answers questions and cooperates with examination appropriately.   Head and Face - Normocephalic and atraumatic, with no gross asymmetry noted.  The facial nerve is intact, with strong symmetric movements.  Voice and Breathing - The patient was breathing comfortably without the use of accessory muscles. There was no wheezing, stridor. The patients voice was clear and strong, and had appropriate pitch and quality.  Neck -  Normal ROM, no palpable lymphadenopathy.   Nose - External contour is symmetric, no gross deflection or scars. Carpenter splints in place bilaterally.      To evaluate the nose and sinuses, I performed rigid nasal endoscopy.  I sprayed both nares with 2 sprays lidocaine and neosynephrine.     I began with the RIGHT side using a 0 degree rigid nasal endoscope, and then similarly examined the LEFT side     Findings: Carpenter splints removed bilaterally, septum is grossly midline and intact, no septal hematoma  Inferior turbinates:  lateralized bilaterally  Middle turbinate and middle meatus: unremarkable  The superior meatus is examined and unremarkable  SER examined  Nasopharynx clear, ET patent, no edema  The patient tolerated the procedure well           Assessment and Plan:       ICD-10-CM    1. Closed fracture nasal bone  S02.2XXA lidocaine 2%-oxymetazoline 0.025% nasal solution 2 spray      2. Closed fracture of nasal bone with routine healing, subsequent encounter  S02.2XXD         Continue low activity level, avoid bending, lifting, sneeze with your mouth open  Use uli med sinus rinses 2-3 times daily for the next couple of weeks then as needed  Follow up in ENT as needed    Ashley Segura NP-C  [facility]    Again, thank you for allowing me to participate in the care of your patient.        Sincerely,        Ashley Segura, NP    Electronically signed

## 2025-06-04 NOTE — PROGRESS NOTES
Otolaryngology Note         Chief Complaint:     Patient presents with:  Surgical Followup: S/P 05/28 Nasal Fracture            History of Present Illness:     Mayte Parmar is a 64 year old female seen today for routine, removal of Carpenter splints, follow-up status post closed reduction of nasal fracture completed on 5/28/2025 by Dr. Dunbar.  She has overall been doing okay.  No heavy bleeding.  Pain is well-controlled with oral analgesics.  No nasal obstruction.    PREOPERATIVE DIAGNOSIS: 1. Closed nasal fracture.   2.  Nasal septal fracture  POSTOPERATIVE DIAGNOSIS: Closed nasal fracture.   PROCEDURE:    Closed reduction of nasal bone fracture.   Closed reduction nasal septal fracture  SURGEON: Elayne Dunbar D.O.  BLOOD LOSS: 5 mL.   COMPLICATIONS: None.          Medications:     Current Outpatient Rx   Medication Sig Dispense Refill    HYDROcodone-acetaminophen (NORCO) 7.5-325 MG per tablet       sodium chloride (OCEAN) 0.65 % nasal spray Spray 2 sprays in nostril 5 times daily. 480 mL prn    ibuprofen (ADVIL/MOTRIN) 200 MG tablet Take 200 mg by mouth every 4 hours as needed for mild pain. (Patient not taking: Reported on 6/4/2025)      ondansetron (ZOFRAN ODT) 4 MG ODT tab  (Patient not taking: Reported on 6/4/2025)      predniSONE (DELTASONE) 20 MG tablet 20 mg in the morning for days 1-3, then 10 mg (half tab) days 4 and 5 (Patient not taking: Reported on 6/4/2025) 4 tablet 0            Allergies:     Allergies: Patient has no known allergies.          Past Medical History:     Past Medical History:   Diagnosis Date    Diabetes (H)     Idiopathic chronic gout of foot without tophus, unspecified laterality 12/24/2019    Morbid obesity (H) 12/24/2019    Type 2 diabetes mellitus without complication, without long-term current use of insulin (H) 12/24/2019            Past Surgical History:     Past Surgical History:   Procedure Laterality Date    BREAST BIOPSY, CORE RT/LT Right 2011    CLOSED REDUCTION  NOSE N/A 5/28/2025    Procedure: CLOSED REDUCTION NASAL FRACTURE;  Surgeon: Elayne Dunbar MD;  Location: [facility]    COLONOSCOPY N/A 4/25/2025    Procedure: COLONOSCOPY with polypectomy, resolution clip;  Surgeon: James Robb MD;  Location: [facility]    DILATION AND CURETTAGE  1987    missed ab    DILATION AND CURETTAGE  1997    missed ab    MAMMOGRAM - HIM SCAN  2011    MASTECTOMY Right 2011    due to infected biospy site. No cancer    PAP DIAGNOSTIC SMEAR  1990 US BREAST BIOPSY VAC ASSISTED LT Left 10/22/2024       ENT family history reviewed         Social History:     Social History     Tobacco Use    Smoking status: Never     Passive exposure: Past (As a child)    Smokeless tobacco: Never   Vaping Use    Vaping status: Never Used   Substance Use Topics    Alcohol use: Never    Drug use: Never            Review of Systems:     ROS: See HPI         Physical Exam:     /68 (BP Location: Right arm, Patient Position: Sitting, Cuff Size: Adult Regular)   Pulse 112   Temp 98.4  F (36.9  C) (Tympanic)   Resp 18   Ht 1.524 m (5')   Wt 92.5 kg (204 lb)   SpO2 98%   BMI 39.84 kg/m      General - The patient is well nourished and well developed, and appears to have good nutritional status.  Alert and oriented to person and place, answers questions and cooperates with examination appropriately.   Head and Face - Normocephalic and atraumatic, with no gross asymmetry noted.  The facial nerve is intact, with strong symmetric movements.  Voice and Breathing - The patient was breathing comfortably without the use of accessory muscles. There was no wheezing, stridor. The patients voice was clear and strong, and had appropriate pitch and quality.  Neck - Normal ROM, no palpable lymphadenopathy.   Nose - External contour is symmetric, no gross deflection or scars. Carpenter splints in place bilaterally.      To evaluate the nose and sinuses, I performed rigid nasal endoscopy.  I sprayed both nares with 2 sprays  lidocaine and neosynephrine.     I began with the RIGHT side using a 0 degree rigid nasal endoscope, and then similarly examined the LEFT side     Findings: Carpenter splints removed bilaterally, septum is grossly midline and intact, no septal hematoma  Inferior turbinates:  lateralized bilaterally  Middle turbinate and middle meatus: unremarkable  The superior meatus is examined and unremarkable  SER examined  Nasopharynx clear, ET patent, no edema  The patient tolerated the procedure well           Assessment and Plan:       ICD-10-CM    1. Closed fracture nasal bone  S02.2XXA lidocaine 2%-oxymetazoline 0.025% nasal solution 2 spray      2. Closed fracture of nasal bone with routine healing, subsequent encounter  S02.2XXD         Continue low activity level, avoid bending, lifting, sneeze with your mouth open  Use uli med sinus rinses 2-3 times daily for the next couple of weeks then as needed  Follow up in ENT as needed    Ashley Segura NP-C  [facility]

## 2025-07-01 ENCOUNTER — OFFICE VISIT (OUTPATIENT)
Dept: FAMILY MEDICINE | Facility: OTHER | Age: 65
End: 2025-07-01
Attending: STUDENT IN AN ORGANIZED HEALTH CARE EDUCATION/TRAINING PROGRAM
Payer: COMMERCIAL

## 2025-07-01 VITALS
WEIGHT: 198.7 LBS | DIASTOLIC BLOOD PRESSURE: 60 MMHG | OXYGEN SATURATION: 95 % | BODY MASS INDEX: 37.52 KG/M2 | HEART RATE: 80 BPM | RESPIRATION RATE: 16 BRPM | TEMPERATURE: 97.8 F | SYSTOLIC BLOOD PRESSURE: 108 MMHG | HEIGHT: 61 IN

## 2025-07-01 DIAGNOSIS — D50.9 IRON DEFICIENCY ANEMIA, UNSPECIFIED IRON DEFICIENCY ANEMIA TYPE: ICD-10-CM

## 2025-07-01 DIAGNOSIS — Z90.13 S/P MASTECTOMY, BILATERAL: ICD-10-CM

## 2025-07-01 DIAGNOSIS — E66.01 MORBID OBESITY (H): ICD-10-CM

## 2025-07-01 DIAGNOSIS — E11.9 TYPE 2 DIABETES MELLITUS WITHOUT COMPLICATION, WITHOUT LONG-TERM CURRENT USE OF INSULIN (H): ICD-10-CM

## 2025-07-01 DIAGNOSIS — N64.89 HEMATOMA OF LEFT BREAST: Primary | ICD-10-CM

## 2025-07-01 DIAGNOSIS — M17.0 ARTHRITIS OF BOTH KNEES: ICD-10-CM

## 2025-07-01 DIAGNOSIS — S02.2XXK CLOSED FRACTURE OF NASAL BONE WITH NONUNION, SUBSEQUENT ENCOUNTER: ICD-10-CM

## 2025-07-01 DIAGNOSIS — M85.89 OSTEOPENIA OF MULTIPLE SITES: ICD-10-CM

## 2025-07-01 DIAGNOSIS — Z00.00 ROUTINE HISTORY AND PHYSICAL EXAMINATION OF ADULT: ICD-10-CM

## 2025-07-01 DIAGNOSIS — R79.9 ABNORMAL FINDING OF BLOOD CHEMISTRY, UNSPECIFIED: ICD-10-CM

## 2025-07-01 DIAGNOSIS — R53.83 OTHER FATIGUE: ICD-10-CM

## 2025-07-01 LAB
BASOPHILS # BLD AUTO: 0 10E3/UL (ref 0–0.2)
BASOPHILS NFR BLD AUTO: 1 %
CHOLEST SERPL-MCNC: 211 MG/DL
CREAT UR-MCNC: 84.3 MG/DL
EOSINOPHIL # BLD AUTO: 0 10E3/UL (ref 0–0.7)
EOSINOPHIL NFR BLD AUTO: 1 %
ERYTHROCYTE [DISTWIDTH] IN BLOOD BY AUTOMATED COUNT: 16.1 % (ref 10–15)
FASTING STATUS PATIENT QL REPORTED: YES
FERRITIN SERPL-MCNC: 100 NG/ML (ref 11–328)
HCT VFR BLD AUTO: 41.7 % (ref 35–47)
HDLC SERPL-MCNC: 68 MG/DL
HGB BLD-MCNC: 12.9 G/DL (ref 11.7–15.7)
IMM GRANULOCYTES # BLD: 0.1 10E3/UL
IMM GRANULOCYTES NFR BLD: 1 %
LDLC SERPL CALC-MCNC: 129 MG/DL
LYMPHOCYTES # BLD AUTO: 2 10E3/UL (ref 0.8–5.3)
LYMPHOCYTES NFR BLD AUTO: 31 %
MCH RBC QN AUTO: 29.1 PG (ref 26.5–33)
MCHC RBC AUTO-ENTMCNC: 30.9 G/DL (ref 31.5–36.5)
MCV RBC AUTO: 94 FL (ref 78–100)
MICROALBUMIN UR-MCNC: <12 MG/L
MICROALBUMIN/CREAT UR: NORMAL MG/G{CREAT}
MONOCYTES # BLD AUTO: 0.5 10E3/UL (ref 0–1.3)
MONOCYTES NFR BLD AUTO: 8 %
NEUTROPHILS # BLD AUTO: 3.7 10E3/UL (ref 1.6–8.3)
NEUTROPHILS NFR BLD AUTO: 59 %
NONHDLC SERPL-MCNC: 143 MG/DL
NRBC # BLD AUTO: 0 10E3/UL
NRBC BLD AUTO-RTO: 0 /100
PLATELET # BLD AUTO: 251 10E3/UL (ref 150–450)
RBC # BLD AUTO: 4.44 10E6/UL (ref 3.8–5.2)
TRIGL SERPL-MCNC: 68 MG/DL
TSH SERPL DL<=0.005 MIU/L-ACNC: 1.75 UIU/ML (ref 0.3–4.2)
WBC # BLD AUTO: 6.4 10E3/UL (ref 4–11)

## 2025-07-01 PROCEDURE — 82570 ASSAY OF URINE CREATININE: CPT | Mod: ZL | Performed by: STUDENT IN AN ORGANIZED HEALTH CARE EDUCATION/TRAINING PROGRAM

## 2025-07-01 PROCEDURE — 84443 ASSAY THYROID STIM HORMONE: CPT | Mod: ZL | Performed by: STUDENT IN AN ORGANIZED HEALTH CARE EDUCATION/TRAINING PROGRAM

## 2025-07-01 PROCEDURE — 82728 ASSAY OF FERRITIN: CPT | Mod: ZL | Performed by: STUDENT IN AN ORGANIZED HEALTH CARE EDUCATION/TRAINING PROGRAM

## 2025-07-01 PROCEDURE — 85025 COMPLETE CBC W/AUTO DIFF WBC: CPT | Mod: ZL | Performed by: STUDENT IN AN ORGANIZED HEALTH CARE EDUCATION/TRAINING PROGRAM

## 2025-07-01 PROCEDURE — 36415 COLL VENOUS BLD VENIPUNCTURE: CPT | Mod: ZL | Performed by: STUDENT IN AN ORGANIZED HEALTH CARE EDUCATION/TRAINING PROGRAM

## 2025-07-01 PROCEDURE — G0463 HOSPITAL OUTPT CLINIC VISIT: HCPCS

## 2025-07-01 PROCEDURE — 80061 LIPID PANEL: CPT | Mod: ZL | Performed by: STUDENT IN AN ORGANIZED HEALTH CARE EDUCATION/TRAINING PROGRAM

## 2025-07-01 SDOH — HEALTH STABILITY: PHYSICAL HEALTH: ON AVERAGE, HOW MANY DAYS PER WEEK DO YOU ENGAGE IN MODERATE TO STRENUOUS EXERCISE (LIKE A BRISK WALK)?: 3 DAYS

## 2025-07-01 SDOH — HEALTH STABILITY: PHYSICAL HEALTH: ON AVERAGE, HOW MANY MINUTES DO YOU ENGAGE IN EXERCISE AT THIS LEVEL?: 10 MIN

## 2025-07-01 ASSESSMENT — SOCIAL DETERMINANTS OF HEALTH (SDOH): HOW OFTEN DO YOU GET TOGETHER WITH FRIENDS OR RELATIVES?: THREE TIMES A WEEK

## 2025-07-01 ASSESSMENT — PAIN SCALES - GENERAL: PAINLEVEL_OUTOF10: NO PAIN (0)

## 2025-07-01 NOTE — PROGRESS NOTES
Preventive Care Visit  [facility]  Mirsolava Solorzano MD, Family Medicine  Jul 1, 2025      Assessment & Plan     Routine history and physical examination of adult  Mayte presents to clinic today for her annual physical  She is due for her fasting labs.  A1c has been very good and we got one in May so too soon to repeat.    PAP is UTD, no longer needs any more  DEXA scan is UTD, shows osteopenia.  We reviewed management- encourage vitamin D and dietary calcium intake  - CBC with platelets and differential; Future  - TSH with free T4 reflex; Future  - Lipid Profile (Chol, Trig, HDL, LDL calc); Future  - Albumin Random Urine Quantitative with Creat Ratio; Future    Hematoma of left breast  After having mastectomy (for large breast).  She is doing better now.  Probably related to fall she had after surgery.  Her left breast is healing well.  There is still some edema and crusting along incision but appears to be healing well  Follows with breast surgeon as well who had no concerns at last visit    Type 2 diabetes mellitus without complication, without long-term current use of insulin (H)  Very well controlled  HA1c has been very good  Eye exam is UTD  Foot exam done today, normal.  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  Edema of left nasal passage, doing better overall.    Morbid obesity (H)  Encourage lifestyle and dietary changes    Osteopenia of multiple sites  Reviewed lifestyle and dietary changes to promote bone health  Encourage dietary calcium and vitamin D supplementation    Other fatigue  Was anemic after breast hematoma  - Ferritin; Future    Iron deficiency anemia, unspecified iron deficiency anemia type  Will repeat blood work    Abnormal finding of blood chemistry, unspecified  - Ferritin; Future    S/P mastectomy, bilateral  Requesting orthotics for breasts that she can wear when out in Public  Patient will receive  "order and bring copy to [facility] in [place]  - Orthotics, Mastectomy and Custom Compression Orders Type: Mastectomy Supplies; Mastectomy Supply(s) Requested: Breast Form/Prosthetic; Breast Form/Prosthetic Laterality: Bilateral; Breast Form/Prosthetic Quantity: 2    Arthritis of both knees  Patient is doing much better after her knee injections in October.  She feels that pain is slowly setting back in but she is not ready for repeat knee injections yet    BMI  Estimated body mass index is 38.04 kg/m  as calculated from the following:    Height as of this encounter: 1.539 m (5' 0.6\").    Weight as of this encounter: 90.1 kg (198 lb 11.2 oz).   Weight management plan: Discussed healthy diet and exercise guidelines    Counseling  Appropriate preventive services were addressed with this patient via screening, questionnaire, or discussion as appropriate for fall prevention, nutrition, physical activity, Tobacco-use cessation, social engagement, weight loss and cognition.  Checklist reviewing preventive services available has been given to the patient.  Reviewed patient's diet, addressing concerns and/or questions.   She is at risk for lack of exercise and has been provided with information to increase physical activity for the benefit of her well-being.   Discussed possible causes of fatigue.         Subjective   Mayte is a 64 year old, presenting for the following:  Physical        7/1/2025     7:53 AM   Additional Questions   Roomed by Selena miller   Accompanied by Self         7/1/2025     7:53 AM   Patient Reported Additional Medications   Patient reports taking the following new medications anti nausea and pain medication following surgery        Via the Health Maintenance questionnaire, the patient has reported the following services have been completed -Eye Exam: cris 2025-04-30, this information has been sent to the abstraction team.    HPI      Advance Care Planning    Health Care Directive form given to patient " for review.         7/1/2025   General Health   How would you rate your overall physical health? Good   Feel stress (tense, anxious, or unable to sleep) Not at all         7/1/2025   Nutrition   Diet: Regular (no restrictions)         7/1/2025   Exercise   Days per week of moderate/strenous exercise 3 days   Average minutes spent exercising at this level 10 min         7/1/2025   Social Factors   Frequency of gathering with friends or relatives Three times a week   Worry food won't last until get money to buy more No   Food not last or not have enough money for food? No   Do you have housing? (Housing is defined as stable permanent housing and does not include staying outside in a car, in a tent, in an abandoned building, in an overnight shelter, or couch-surfing.) Yes   Are you worried about losing your housing? No   Lack of transportation? No   Unable to get utilities (heat,electricity)? No         7/1/2025   Fall Risk   Fallen 2 or more times in the past year? No   Trouble with walking or balance? No          7/1/2025   Activities of Daily Living- Home Safety   Needs help with the following daily activites None of the above   Safety concerns in the home None of the above         7/1/2025   Dental   Dentist two times every year? Yes         7/1/2025   Hearing Screening   Hearing concerns? None of the above         7/1/2025   Driving Risk Screening   Patient/family members have concerns about driving No         7/1/2025   General Alertness/Fatigue Screening   Have you been more tired than usual lately? (!) YES         7/1/2025   Urinary Incontinence Screening   Bothered by leaking urine in past 6 months No         Today's PHQ-2 Score:       7/1/2025     7:52 AM   PHQ-2 ( 1999 [employer])   Q1: Little interest or pleasure in doing things 0   Q2: Feeling down, depressed or hopeless 0   PHQ-2 Score 0    Q1: Little interest or pleasure in doing things Not at all   Q2: Feeling down, depressed or hopeless Not at all   PHQ-2  Score 0       Patient-reported           7/1/2025   Substance Use   Alcohol more than 3/day or more than 7/wk Not Applicable   Do you have a current opioid prescription? No   How severe/bad is pain from 1 to 10? 0/10 (No Pain)   Do you use any other substances recreationally? No     Social History     Tobacco Use    Smoking status: Never     Passive exposure: Past (As a child)    Smokeless tobacco: Never   Vaping Use    Vaping status: Never Used   Substance Use Topics    Alcohol use: Never    Drug use: Never           4/14/2025   LAST FHS-7 RESULTS   1st degree relative breast or ovarian cancer No   Any relative bilateral breast cancer No   Any male have breast cancer No   Any ONE woman have BOTH breast AND ovarian cancer No   Any woman with breast cancer before 50yrs No   2 or more relatives with breast AND/OR ovarian cancer No   2 or more relatives with breast AND/OR bowel cancer No       Double mastectomy- no longer needed    History of abnormal Pap smear: No - age 65 or older with adequate negative prior screening test results (3 consecutive negative cytology results, 2 consecutive negative cotesting results, or 2 consecutive negative HrHPV test results within 10 years, with the most recent test occurring within the recommended screening interval for the test used)        Latest Ref Rng & Units 6/21/2024     8:55 AM   PAP / HPV   PAP  Negative for Intraepithelial Lesion or Malignancy (NILM)    HPV 16 DNA Negative Negative    HPV 18 DNA Negative Negative    Other HR HPV Negative Negative      ASCVD Risk   The 10-year ASCVD risk score (Mil BALDERRAMA, et al., 2019) is: 8.5%    Values used to calculate the score:      Age: 64 years      Sex: Female      Is Non- : No      Diabetic: Yes      Tobacco smoker: No      Systolic Blood Pressure: 108 mmHg      Is BP treated: No      HDL Cholesterol: 79 mg/dL      Total Cholesterol: 265 mg/dL      Reviewed and updated as needed this visit by  Provider                    Past Medical History:   Diagnosis Date    Diabetes (H)     Idiopathic chronic gout of foot without tophus, unspecified laterality 2019    Morbid obesity (H) 2019    Type 2 diabetes mellitus without complication, without long-term current use of insulin (H) 2019     Past Surgical History:   Procedure Laterality Date    BREAST BIOPSY, CORE RT/LT Right     CLOSED REDUCTION NOSE N/A 2025    Procedure: CLOSED REDUCTION NASAL FRACTURE;  Surgeon: Elayen Dunbar MD;  Location: [facility]    COLONOSCOPY N/A 2025    Procedure: COLONOSCOPY with polypectomy, resolution clip;  Surgeon: James Robb MD;  Location: [facility]    DILATION AND CURETTAGE      missed ab    DILATION AND CURETTAGE      missed ab    MAMMOGRAM - HIM SCAN      MASTECTOMY Right     due to infected biospy site. No cancer    PAP DIAGNOSTIC SMEAR      US BREAST BIOPSY VAC ASSISTED LT Left 10/22/2024     OB History    Para Term  AB Living   3 1 1 0 2 0   SAB IAB Ectopic Multiple Live Births   2 0 0 0 1      # Outcome Date GA Lbr Danie/2nd Weight Sex Type Anes PTL Lv   3 SAB            2 SAB            1 Term              Current providers sharing in care for this patient include:  Patient Care Team:  Miroslava Solorzano MD as PCP - General (Family Medicine)  Miroslava Solorzano MD as Assigned PCP  Mendoza Rogel MD as Assigned OBGYN Provider  James Robb MD as Assigned Surgical Provider  Tawanna Washington DPM as Assigned Musculoskeletal Provider    The following health maintenance items are reviewed in Epic and correct as of today:  Health Maintenance   Topic Date Due    RSV VACCINE (1 - Risk 60-74 years 1-dose series) Never done    DIABETIC FOOT EXAM  2022    ZOSTER VACCINE (2 of 2) 2023    MICROALBUMIN  2023    COVID-19 VACCINE ( - - season) Never done    EYE EXAM  2025    LIPID  2025    MEDICARE ANNUAL WELLNESS VISIT  2025  "   A1C  08/14/2025    INFLUENZA VACCINE (1) 09/01/2025    BMP  05/27/2026    FALL RISK ASSESSMENT  07/01/2026    MAMMO SCREENING  04/14/2027    COLORECTAL CANCER SCREENING  04/25/2030    ADVANCE CARE PLANNING  05/27/2030    DTAP/TDAP/TD VACCINE (3 - Td or Tdap) 09/21/2031    DEXA  08/02/2039    HEPATITIS C SCREENING  Completed    PHQ-2 (once per calendar year)  Completed    PNEUMOCOCCAL VACCINE 50+ YEARS  Completed    HPV VACCINE  Aged Out    MENINGITIS VACCINE  Aged Out    PAP  Discontinued       Review of Systems  Constitutional, HEENT, cardiovascular, pulmonary, GI, , musculoskeletal, neuro, skin, endocrine and psych systems are negative, except as otherwise noted.     Objective    Exam  /60 (BP Location: Right arm, Patient Position: Sitting, Cuff Size: Adult Large)   Pulse 80   Temp 97.8  F (36.6  C) (Tympanic)   Resp 16   Ht 1.539 m (5' 0.6\")   Wt 90.1 kg (198 lb 11.2 oz)   SpO2 95%   BMI 38.04 kg/m     Estimated body mass index is 38.04 kg/m  as calculated from the following:    Height as of this encounter: 1.539 m (5' 0.6\").    Weight as of this encounter: 90.1 kg (198 lb 11.2 oz).    Physical Exam  GENERAL: alert and no distress  EYES: Eyes grossly normal to inspection, PERRL and conjunctivae and sclerae normal  HENT: ear canals and TM's normal, nose and mouth without ulcers or lesions  NECK: no adenopathy, no asymmetry, masses, or scars  RESP: lungs clear to auscultation - no rales, rhonchi or wheezes  CV: regular rate and rhythm, normal S1 S2, no S3 or S4, no murmur, click or rub, no peripheral edema  ABDOMEN: soft, nontender, no hepatosplenomegaly, no masses and bowel sounds normal  MS: no gross musculoskeletal defects noted, no edema  SKIN: no suspicious lesions or rashes  NEURO: Normal strength and tone, mentation intact and speech normal  PSYCH: mentation appears normal, affect normal/bright    Diabetic Foot Screen:  Any complaints of increased pain or numbness ? No  Is there a foot " ulcer now or a history of foot ulcer? No  Does the foot have an abnormal shape? No  Are the nails thick, too long or ingrown? No  Are there any redness or open areas? No         Sensation Testing done at all points on the diagram with monofilament     Right Foot: Sensation Normal at all points  Left Foot: Sensation Normal at all points     Risk Category: 0- No loss of protective sensation  Performed by Miroslava Solorzano MD         7/1/2025   Mini Cog   Mini-Cog Not Completed (choose reason) Patient declines             6/21/2024   Vision Screen   Reason Vision Screen Not Completed Patient had exam in last 12 months        Data saved with a previous flowsheet row definition       Signed Electronically by: Miroslava Solorzano MD

## 2025-07-23 PROBLEM — D12.6 ADENOMATOUS POLYP OF COLON: Status: ACTIVE | Noted: 2025-07-23

## 2025-07-24 ENCOUNTER — PATIENT OUTREACH (OUTPATIENT)
Dept: GASTROENTEROLOGY | Facility: CLINIC | Age: 65
End: 2025-07-24

## (undated) DEVICE — SOL NACL 0.9% IRRIG 1000ML BOTTLE 2F7124

## (undated) DEVICE — GLOVE 6.5 PROTEXIS PI CLASSIC 2D72PL65X

## (undated) DEVICE — SUCTION TUBE YANKAUR K61

## (undated) DEVICE — SLEEVE SCD EXPRESS KNEE LENGTH MED 9529

## (undated) DEVICE — SOL WATER IRRIG 1000ML BOTTLE 2F7114

## (undated) DEVICE — SYR 10ML FINGER CONTROL W/O NDL 309695

## (undated) DEVICE — TUBING SUCTION 20FT N620A

## (undated) DEVICE — CANISTER SUCTION MEDI-VAC GUARDIAN 2000ML 90D 65651-220

## (undated) DEVICE — NDL 27GA 1 1/2" 1188827112

## (undated) DEVICE — PACK SET UP CUSTOM SBA32SUMBF

## (undated) DEVICE — PACK BASIN SET UP SUTCNBSBBA

## (undated) DEVICE — SPLINT NASAL DENVER SM/MED 1500 SERIES 10-1500-05KS

## (undated) DEVICE — ADH LIQUID MASTISOL TOPICAL VIAL 2-3ML 0523-48

## (undated) DEVICE — ENDO SNARE EXACTO COLD 9MM LOOP 2.4MMX230CM 00711115

## (undated) DEVICE — BIN-ENT BIN BN07

## (undated) DEVICE — ENDO TRAP POLYP E-TRAP 00711099

## (undated) DEVICE — Device

## (undated) DEVICE — LABEL STERILE PREPRINTED FOR OR FRRH01-2M

## (undated) DEVICE — CLIP-RESOLUTION CLIPPING DEVICE

## (undated) DEVICE — SPONGE RAY-TEC 4X4" 7317

## (undated) DEVICE — SUCTION MANIFOLD NEPTUNE 2 SYS 1 PORT 702-025-000

## (undated) DEVICE — CONNECTOR ERBEFLO 2 PORT 20325-215

## (undated) RX ORDER — DEXAMETHASONE SODIUM PHOSPHATE 10 MG/ML
INJECTION, SOLUTION INTRAMUSCULAR; INTRAVENOUS
Status: DISPENSED
Start: 2025-05-28

## (undated) RX ORDER — LIDOCAINE HYDROCHLORIDE 10 MG/ML
INJECTION, SOLUTION EPIDURAL; INFILTRATION; INTRACAUDAL; PERINEURAL
Status: DISPENSED
Start: 2024-05-17

## (undated) RX ORDER — ONDANSETRON 2 MG/ML
INJECTION INTRAMUSCULAR; INTRAVENOUS
Status: DISPENSED
Start: 2025-05-28

## (undated) RX ORDER — FENTANYL CITRATE 50 UG/ML
INJECTION, SOLUTION INTRAMUSCULAR; INTRAVENOUS
Status: DISPENSED
Start: 2025-05-28

## (undated) RX ORDER — PROPOFOL 10 MG/ML
INJECTION, EMULSION INTRAVENOUS
Status: DISPENSED
Start: 2025-05-28